# Patient Record
Sex: FEMALE | Employment: OTHER | URBAN - METROPOLITAN AREA
[De-identification: names, ages, dates, MRNs, and addresses within clinical notes are randomized per-mention and may not be internally consistent; named-entity substitution may affect disease eponyms.]

---

## 2020-03-13 ENCOUNTER — TELEPHONE (OUTPATIENT)
Dept: INFECTIOUS DISEASES | Age: 85
End: 2020-03-13

## 2020-03-20 ENCOUNTER — VIRTUAL VISIT (OUTPATIENT)
Dept: INFECTIOUS DISEASES | Age: 85
End: 2020-03-20

## 2020-03-20 PROCEDURE — 99443 PR PHYS/QHP TELEPHONE EVALUATION 21-30 MIN: CPT | Performed by: INTERNAL MEDICINE

## 2020-03-20 RX ORDER — NITROFURANTOIN 25; 75 MG/1; MG/1
100 CAPSULE ORAL DAILY
Qty: 14 CAPSULE | Refills: 0 | Status: SHIPPED | OUTPATIENT
Start: 2020-03-20 | End: 2020-04-03

## 2020-03-20 ASSESSMENT — ENCOUNTER SYMPTOMS
COUGH: 1
BACK PAIN: 1
SHORTNESS OF BREATH: 1

## 2020-03-20 NOTE — PROGRESS NOTES
Subjective:      Patient ID: Denise Tripathi is a 80 y.o. female. HPI. She was instructed that he will be billed for the phone visit and she is agreeable    Referred by Dr Catarino Ham for recurrent MDR E coli UTI  Cystoscopy: + multiple trabeculations and high postvoid residuals. PMHx: HTN, esophageal web, OA, GERD, HLP, CKD 3, IDDM2, urine retention  PSHx, social Hx and FHx were reviewed, not contributory  On Cipro 2 x weekly  Review of Systems   Respiratory: Positive for cough (dry, for 1 week) and shortness of breath (FRANCO). Genitourinary: Positive for dysuria, frequency (nocturia) and urgency. Musculoskeletal: Positive for arthralgias (pain in legs) and back pain (low, progressive). All other systems reviewed and are negative. Allergies: shellfish and Sulfa  Objective:   Physical Exam   No exam done  02/24 and 11/2019 urine Cx + ESBL E coli sens to Nitrofurantoin  3/1/2020  2:37 PM - RobbieRamirez cat Incoming Lab Results From Soft     Component Value Ref Range & Units Status Collected Lab   Hemoglobin A1C 7.5High   4.8 - 5.9 % Final 03/01/2020 11:30 AM [address] [facility]   Testing Performed By     3/1/2020  3:47 PM - Ramirez Avalos Incoming Lab Results From Soft     Component Value Ref Range & Units Status Collected Lab   Sodium 137  135 - 144 mEq/L Final 03/01/2020 11:30 AM [address] [facility]   Potassium 5.0High   3.4 - 4.9 mEq/L Final 03/01/2020 11:30 AM  - [facility]   Chloride 101  95 - 107 mEq/L Final 03/01/2020 11:30 AM [address] [facility]   CO2 22  20 - 31 mEq/L Final 03/01/2020 11:30 AM [address] [facility]   Anion Gap 14  9 - 15 mEq/L Final 03/01/2020 11:30 AM [address] [facility]   Glucose 152High   70 - 99 mg/dL Final 03/01/2020 11:30 AM [address] [facility]   BUN 34High   8 - 23 mg/dL Final 03/01/2020 11:30 AM [address] [facility]   CREATININE 1. 63High   0.50 - 0.90 mg/dL Final 03/01/2020 11:30 AM [address] [facility]   GFR Non-African American 29.8Low >60 Final 03/01/2020 11:30 AM  - [facility]   >60 mL/min/1.73m2 EGFR, calc. for ages        Assessment:    Recurrent UTI  ESBL E coli infection  Neurogenic Bladder  IDDM with CKD   9:30 am-10:00 am   Plan:      U/A with reflex Cx in 1 week  D/C Cipro  Nitrofurantoin 100 mg daily for 2 weeks and 50 mg daily afterwards for chronic suppressive Rx  Might need straight cath if persistent urine retention  Lactobacillus          Inocente Bennett MD

## 2020-04-06 ENCOUNTER — TELEPHONE (OUTPATIENT)
Dept: INFECTIOUS DISEASES | Age: 85
End: 2020-04-06

## 2020-04-06 RX ORDER — LOSARTAN POTASSIUM 100 MG/1
TABLET ORAL DAILY
COMMUNITY

## 2020-04-06 RX ORDER — TAMSULOSIN HYDROCHLORIDE 0.4 MG/1
CAPSULE ORAL DAILY
COMMUNITY
Start: 2020-03-26

## 2020-04-06 RX ORDER — VERAPAMIL HYDROCHLORIDE 300 MG/1
CAPSULE, EXTENDED RELEASE ORAL
COMMUNITY

## 2020-04-06 RX ORDER — OXYBUTYNIN CHLORIDE 10 MG/1
TABLET, EXTENDED RELEASE ORAL
COMMUNITY

## 2020-04-06 RX ORDER — DULOXETIN HYDROCHLORIDE 20 MG/1
CAPSULE, DELAYED RELEASE ORAL DAILY
COMMUNITY

## 2020-04-06 RX ORDER — MIRABEGRON 50 MG/1
50 TABLET, FILM COATED, EXTENDED RELEASE ORAL DAILY
COMMUNITY
Start: 2020-03-05

## 2020-04-06 RX ORDER — BLOOD SUGAR DIAGNOSTIC
STRIP MISCELLANEOUS
COMMUNITY
Start: 2020-03-16

## 2020-04-06 NOTE — TELEPHONE ENCOUNTER
Patient states she saw Dr Carissa Garcia last month for UTI, referred by Dr Barbara Kamara. States she is having Burning sensation when she voids, Denies Fever/Chills. will update ID Physician. Found telephone visit/encounter was on 3/20/20 with Dr Carissa Garcia. Patient states she is Only On Cephalexine 500 mg, po, she takes One on Wed's & Sun's Only, per Dr Barbara Kamara. Will update Dr Chadd Barillas, covering for Dr Carissa Garcia.

## 2020-04-07 LAB
APPEARANCE: ABNORMAL
BACTERIA, URINE: ABNORMAL /HPF
BILIRUBIN, URINE: NEGATIVE
BLOOD, URINE: NEGATIVE
COLOR, URINE: YELLOW
GLUCOSE, URINE: NEGATIVE MG/DL
KETONES, URINE: NEGATIVE MG/DL
LEUKOCYTE ESTERASE, URINE: ABNORMAL
NITRITE, URINE: POSITIVE
PH UA: 5 (ref 5–8)
PROTEIN UA: ABNORMAL MG/DL
RBC URINE: ABNORMAL /HPF (ref 0–5)
SPECIFIC GRAVITY, URINE: 1.01 (ref 1–1)
SQUAMOUS EPITHELIAL: ABNORMAL /HPF
UROBILINOGEN, URINE: <2 MG/DL (ref 0–1.9)
WBC URINE: ABNORMAL /HPF (ref 0–5)

## 2020-04-09 ENCOUNTER — VIRTUAL VISIT (OUTPATIENT)
Dept: INFECTIOUS DISEASES | Age: 85
End: 2020-04-09

## 2020-04-09 PROCEDURE — 99214 OFFICE O/P EST MOD 30 MIN: CPT | Performed by: INTERNAL MEDICINE

## 2020-04-09 RX ORDER — GRANULES FOR ORAL 3 G/1
3 POWDER ORAL ONCE
Qty: 1 EACH | Refills: 0 | Status: SHIPPED | OUTPATIENT
Start: 2020-04-09 | End: 2020-04-09

## 2020-04-09 NOTE — PROGRESS NOTES
weeks and possible 50mg daily after for chronic suppression  Has hx of urinary retention    Appears to be doing ok. Subjectively, + dysuria    All 14 review of systems discussed and negative other than as stated as above. Since we cannot conduct an in-person exam, the following were addressed with the patient. I have asked the patient to assist in their exam:  Patient denies any general new issues. Patient does not observe any new skin rashes or ulcers. Patient does not perceive any new visual deficits  Patient does not feel like they are \"clammy\" or sweating  Patient denies any dry mouth or sore mouth and is able to flex and extend her neck with ease. Patient can inhale and exhale without any difficulty and feels like their chest is expanding symmetrically. They do not feel short of breath or any distress when asked to move around. No pain with expansion of the chest  Patient is able to feel their own pulse and agrees it is regular. Patient states their abdomen is not protruberant beyond their normal size. States that there is no pain when touching the area beneath the sternum when directed, or the left or the right side of the abdomen. They feel no pain when asked to press on the suprapubic area or the right or left flank. Patient denies any pain when asked to squeeze both upper legs and lower legs anteriorly or posteriorly. They do not see any new swelling of their joints. No observed neurological changes or slurred speech when speaking to the patient      Imaging and labs were reviewed per medical records and any ID pertinent labs were addressed with the patient. No results found for: WBC, HGB, HCT, MCV, PLT    At this point, the camera cut out and we finished the interview by telephone. ASSESSMENT  MDR E coli UTI s/p cysto with multiple trabeculations    Recurrent UTI  ESBL E coli infection - has yet to be treated bc she did not  her macrobid yet.   Neurogenic Bladder  IDDM with CKD    PLAN:  CX pending. Nitrofurantoin 100 mg BID for 10 days and then possibly 50 mg daily afterwards for chronic suppressive Rx  Lactobacillus  Cranberry supplement - states that she just bought a bottle but has yet to use it. Follow up in 2-3 weeks. Discussed with patient ways to help prevent recurrent UTIs, including but not limited to timed voiding, cranberry supplement, and maintaining good overall hygiene.  Handwashing is essential.     Time spent with patient: 25 min  >50% of time spent counseling on patient care and coordination of care      Marivel Pitts DO

## 2020-04-20 ENCOUNTER — TELEPHONE (OUTPATIENT)
Dept: INFECTIOUS DISEASES | Age: 85
End: 2020-04-20

## 2020-05-01 ENCOUNTER — VIRTUAL VISIT (OUTPATIENT)
Dept: INFECTIOUS DISEASES | Age: 85
End: 2020-05-01

## 2020-05-01 LAB
ANION GAP SERPL CALCULATED.3IONS-SCNC: 11 MMOL/L (ref 10–20)
APPEARANCE: CLEAR
BACTERIA, URINE: ABNORMAL /HPF
BICARBONATE: 29 MMOL/L (ref 21–32)
BILIRUBIN, URINE: NEGATIVE
BLOOD, URINE: NEGATIVE
CALCIUM SERPL-MCNC: 9.6 MG/DL (ref 8.6–10.3)
CHLORIDE BLD-SCNC: 97 MMOL/L (ref 98–107)
COLOR, URINE: YELLOW
CREAT SERPL-MCNC: 1.7 MG/DL (ref 0.5–1)
ERYTHROCYTE [DISTWIDTH] IN BLOOD BY AUTOMATED COUNT: 13.2 % (ref 11.5–14)
GFR AFRICAN AMERICAN: 34 ML/MIN/1.73M2
GFR NON-AFRICAN AMERICAN: 28 ML/MIN/1.73M2
GLUCOSE, URINE: NEGATIVE MG/DL
GLUCOSE: 137 MG/DL (ref 74–99)
HCT VFR BLD CALC: 36.7 % (ref 36–46)
HEMOGLOBIN: 11.9 G/DL (ref 12–16)
KETONES, URINE: NEGATIVE MG/DL
LEUKOCYTE ESTERASE, URINE: ABNORMAL
MCHC RBC AUTO-ENTMCNC: 32.4 G/DL (ref 32–36)
MCV RBC AUTO: 90 FL (ref 80–100)
NITRITE, URINE: NEGATIVE
PH UA: 7 (ref 5–8)
PLATELET # BLD: 206 X10E9/L (ref 150–450)
POTASSIUM SERPL-SCNC: 4.9 MMOL/L (ref 3.5–5.3)
PROTEIN UA: ABNORMAL MG/DL
RBC # BLD: 4.1 X10E12/L (ref 4–5.2)
RBC URINE: ABNORMAL /HPF (ref 0–5)
SODIUM BLD-SCNC: 132 MMOL/L (ref 136–145)
SPECIFIC GRAVITY, URINE: 1.01 (ref 1–1)
SPECIMEN SOURCE: ABNORMAL
SQUAMOUS EPITHELIAL: ABNORMAL /HPF
UREA NITROGEN: 38 MG/DL (ref 6–23)
UROBILINOGEN, URINE: <2 MG/DL (ref 0–1.9)
WBC URINE: ABNORMAL /HPF (ref 0–5)
WBC: 7.9 X10E9/L (ref 4.4–11.3)

## 2020-05-01 PROCEDURE — 99213 OFFICE O/P EST LOW 20 MIN: CPT | Performed by: INTERNAL MEDICINE

## 2020-05-01 ASSESSMENT — ENCOUNTER SYMPTOMS: BACK PAIN: 1

## 2020-05-12 ENCOUNTER — TELEPHONE (OUTPATIENT)
Dept: INFECTIOUS DISEASES | Age: 85
End: 2020-05-12

## 2020-05-12 NOTE — TELEPHONE ENCOUNTER
Patient has not heard of Lab results, called last week and today. Per review with Dr Livia Villagomez, U/a & Cx resulted on 5/1/20 are fine. Keep same course of Medication as per Dr Shon Mackenzie. Returned call and left a brief message. Request to return a call.

## 2020-05-29 ENCOUNTER — VIRTUAL VISIT (OUTPATIENT)
Dept: INFECTIOUS DISEASES | Age: 85
End: 2020-05-29

## 2020-05-29 PROCEDURE — 99443 PR PHYS/QHP TELEPHONE EVALUATION 21-30 MIN: CPT | Performed by: INTERNAL MEDICINE

## 2020-05-29 RX ORDER — CEPHALEXIN 500 MG/1
500 CAPSULE ORAL DAILY
Qty: 90 CAPSULE | Refills: 3 | Status: SHIPPED | OUTPATIENT
Start: 2020-05-29 | End: 2020-08-27

## 2020-09-01 LAB
ANION GAP SERPL CALCULATED.3IONS-SCNC: 13 MMOL/L (ref 10–20)
BICARBONATE: 27 MMOL/L (ref 21–32)
CALCIUM SERPL-MCNC: 9.7 MG/DL (ref 8.6–10.3)
CHLORIDE BLD-SCNC: 102 MMOL/L (ref 98–107)
CREAT SERPL-MCNC: 1.72 MG/DL (ref 0.5–1)
ERYTHROCYTE [DISTWIDTH] IN BLOOD BY AUTOMATED COUNT: 13.3 % (ref 11.5–14)
GFR AFRICAN AMERICAN: 34 ML/MIN/1.73M2
GFR NON-AFRICAN AMERICAN: 28 ML/MIN/1.73M2
GLUCOSE: 89 MG/DL (ref 74–99)
HCT VFR BLD CALC: 34.7 % (ref 36–46)
HEMOGLOBIN: 11 G/DL (ref 12–16)
MCHC RBC AUTO-ENTMCNC: 31.7 G/DL (ref 32–36)
MCV RBC AUTO: 92 FL (ref 80–100)
PLATELET # BLD: 187 X10E9/L (ref 150–450)
POTASSIUM SERPL-SCNC: 4.9 MMOL/L (ref 3.5–5.3)
RBC # BLD: 3.77 X10E12/L (ref 4–5.2)
SODIUM BLD-SCNC: 137 MMOL/L (ref 136–145)
UREA NITROGEN: 32 MG/DL (ref 6–23)
WBC: 7.1 X10E9/L (ref 4.4–11.3)

## 2023-02-03 PROBLEM — K21.9 GERD (GASTROESOPHAGEAL REFLUX DISEASE): Status: ACTIVE | Noted: 2023-02-03

## 2023-02-03 PROBLEM — N18.6 TYPE 2 DIABETES MELLITUS WITH CHRONIC KIDNEY DISEASE ON CHRONIC DIALYSIS, WITHOUT LONG-TERM CURRENT USE OF INSULIN (MULTI): Status: ACTIVE | Noted: 2023-02-03

## 2023-02-03 PROBLEM — R30.0 BURNING WITH URINATION: Status: ACTIVE | Noted: 2023-02-03

## 2023-02-03 PROBLEM — E78.5 HYPERLIPIDEMIA: Status: ACTIVE | Noted: 2023-02-03

## 2023-02-03 PROBLEM — R32 URINARY INCONTINENCE: Status: ACTIVE | Noted: 2023-02-03

## 2023-02-03 PROBLEM — R35.1 NOCTURIA: Status: ACTIVE | Noted: 2023-02-03

## 2023-02-03 PROBLEM — E11.22 TYPE 2 DIABETES MELLITUS WITH CHRONIC KIDNEY DISEASE ON CHRONIC DIALYSIS, WITHOUT LONG-TERM CURRENT USE OF INSULIN (MULTI): Status: ACTIVE | Noted: 2023-02-03

## 2023-02-03 PROBLEM — N39.0 URINARY TRACT INFECTION: Status: ACTIVE | Noted: 2023-02-03

## 2023-02-03 PROBLEM — R30.0 BURNING WITH URINATION: Status: RESOLVED | Noted: 2023-02-03 | Resolved: 2023-02-03

## 2023-02-03 PROBLEM — I10 BENIGN ESSENTIAL HYPERTENSION: Status: ACTIVE | Noted: 2023-02-03

## 2023-02-03 PROBLEM — N18.4 STAGE 4 CHRONIC KIDNEY DISEASE (MULTI): Status: ACTIVE | Noted: 2023-02-03

## 2023-02-03 PROBLEM — R26.89 BALANCE PROBLEMS: Status: ACTIVE | Noted: 2023-02-03

## 2023-02-03 PROBLEM — Z99.2 TYPE 2 DIABETES MELLITUS WITH CHRONIC KIDNEY DISEASE ON CHRONIC DIALYSIS, WITHOUT LONG-TERM CURRENT USE OF INSULIN (MULTI): Status: ACTIVE | Noted: 2023-02-03

## 2023-02-03 PROBLEM — M15.9 GENERALIZED OSTEOARTHRITIS OF MULTIPLE SITES: Status: ACTIVE | Noted: 2023-02-03

## 2023-02-03 PROBLEM — R82.89 ABNORMAL URINE CYTOLOGY: Status: ACTIVE | Noted: 2023-02-03

## 2023-02-03 PROBLEM — E16.2 HYPOGLYCEMIA: Status: ACTIVE | Noted: 2023-02-03

## 2023-02-03 PROBLEM — E16.2 HYPOGLYCEMIA: Status: RESOLVED | Noted: 2023-02-03 | Resolved: 2023-02-03

## 2023-02-03 RX ORDER — TAMSULOSIN HYDROCHLORIDE 0.4 MG/1
1 CAPSULE ORAL DAILY
COMMUNITY
Start: 2019-04-02

## 2023-02-03 RX ORDER — PANTOPRAZOLE SODIUM 40 MG/1
1 TABLET, DELAYED RELEASE ORAL
COMMUNITY
Start: 2020-11-11

## 2023-02-03 RX ORDER — LOSARTAN POTASSIUM 100 MG/1
100 TABLET ORAL DAILY
COMMUNITY
Start: 2013-09-20 | End: 2023-08-18 | Stop reason: DRUGHIGH

## 2023-02-03 RX ORDER — CEPHALEXIN 500 MG/1
500 CAPSULE ORAL 3 TIMES DAILY
COMMUNITY
Start: 2022-08-17 | End: 2023-05-01 | Stop reason: ALTCHOICE

## 2023-02-03 RX ORDER — CHOLECALCIFEROL (VITAMIN D3) 25 MCG
1 TABLET ORAL DAILY
COMMUNITY
Start: 2014-08-12

## 2023-02-03 RX ORDER — AMMONIUM LACTATE 12 G/100G
LOTION TOPICAL
COMMUNITY
Start: 2022-05-10

## 2023-02-03 RX ORDER — BLOOD SUGAR DIAGNOSTIC
STRIP MISCELLANEOUS
COMMUNITY
Start: 2019-12-17 | End: 2023-05-24 | Stop reason: SDUPTHER

## 2023-02-03 RX ORDER — OXYBUTYNIN CHLORIDE 10 MG/1
10 TABLET, EXTENDED RELEASE ORAL DAILY
COMMUNITY
Start: 2020-03-06 | End: 2023-05-01 | Stop reason: SINTOL

## 2023-02-03 RX ORDER — DULOXETIN HYDROCHLORIDE 20 MG/1
1 CAPSULE, DELAYED RELEASE ORAL DAILY
COMMUNITY
Start: 2020-03-10 | End: 2023-06-12 | Stop reason: SDUPTHER

## 2023-02-03 RX ORDER — INSULIN GLARGINE 100 [IU]/ML
20 INJECTION, SOLUTION SUBCUTANEOUS DAILY
COMMUNITY

## 2023-02-03 RX ORDER — VERAPAMIL HYDROCHLORIDE 240 MG/1
240 CAPSULE, EXTENDED RELEASE ORAL
COMMUNITY
Start: 2013-05-21 | End: 2023-06-16 | Stop reason: SDUPTHER

## 2023-03-06 LAB
APPEARANCE, URINE: ABNORMAL
BACTERIA, URINE: ABNORMAL /HPF
BILIRUBIN, URINE: NEGATIVE
BLOOD, URINE: NEGATIVE
COLOR, URINE: YELLOW
GLUCOSE, URINE: NEGATIVE MG/DL
HYALINE CASTS, URINE: ABNORMAL /LPF
KETONES, URINE: ABNORMAL MG/DL
LEUKOCYTE ESTERASE, URINE: ABNORMAL
MUCUS, URINE: ABNORMAL /LPF
NITRITE, URINE: NEGATIVE
PH, URINE: 5 (ref 5–8)
PROTEIN, URINE: ABNORMAL MG/DL
RBC, URINE: 4 /HPF (ref 0–5)
SPECIFIC GRAVITY, URINE: 1.01 (ref 1–1.03)
SQUAMOUS EPITHELIAL CELLS, URINE: <1 /HPF
UROBILINOGEN, URINE: <2 MG/DL (ref 0–1.9)
WBC CLUMPS, URINE: ABNORMAL /HPF
WBC, URINE: >182 /HPF (ref 0–5)

## 2023-03-09 LAB — URINE CULTURE: ABNORMAL

## 2023-03-17 ENCOUNTER — APPOINTMENT (OUTPATIENT)
Dept: PRIMARY CARE | Facility: CLINIC | Age: 88
End: 2023-03-17
Payer: MEDICARE

## 2023-04-07 LAB
APPEARANCE, URINE: ABNORMAL
BACTERIA, URINE: ABNORMAL /HPF
BILIRUBIN, URINE: NEGATIVE
BLOOD, URINE: NEGATIVE
COLOR, URINE: YELLOW
GLUCOSE, URINE: NEGATIVE MG/DL
HYALINE CASTS, URINE: ABNORMAL /LPF
KETONES, URINE: NEGATIVE MG/DL
LEUKOCYTE ESTERASE, URINE: ABNORMAL
MUCUS, URINE: ABNORMAL /LPF
NITRITE, URINE: NEGATIVE
PH, URINE: 5 (ref 5–8)
PROTEIN, URINE: ABNORMAL MG/DL
RBC, URINE: ABNORMAL /HPF (ref 0–5)
SPECIFIC GRAVITY, URINE: 1.01 (ref 1–1.03)
SQUAMOUS EPITHELIAL CELLS, URINE: 5 /HPF
UROBILINOGEN, URINE: <2 MG/DL (ref 0–1.9)
WBC, URINE: 27 /HPF (ref 0–5)

## 2023-04-09 LAB — URINE CULTURE: ABNORMAL

## 2023-05-01 ENCOUNTER — OFFICE VISIT (OUTPATIENT)
Dept: PRIMARY CARE | Facility: CLINIC | Age: 88
End: 2023-05-01
Payer: MEDICARE

## 2023-05-01 ENCOUNTER — APPOINTMENT (OUTPATIENT)
Dept: LAB | Facility: LAB | Age: 88
End: 2023-05-01
Payer: MEDICARE

## 2023-05-01 ENCOUNTER — LAB (OUTPATIENT)
Dept: LAB | Facility: LAB | Age: 88
End: 2023-05-01
Payer: MEDICARE

## 2023-05-01 VITALS
HEART RATE: 69 BPM | SYSTOLIC BLOOD PRESSURE: 124 MMHG | HEIGHT: 67 IN | TEMPERATURE: 96 F | DIASTOLIC BLOOD PRESSURE: 75 MMHG | WEIGHT: 152 LBS | BODY MASS INDEX: 23.86 KG/M2

## 2023-05-01 DIAGNOSIS — E11.22 TYPE 2 DIABETES MELLITUS WITH CHRONIC KIDNEY DISEASE ON CHRONIC DIALYSIS, WITHOUT LONG-TERM CURRENT USE OF INSULIN (MULTI): ICD-10-CM

## 2023-05-01 DIAGNOSIS — I10 BENIGN ESSENTIAL HYPERTENSION: ICD-10-CM

## 2023-05-01 DIAGNOSIS — N18.6 TYPE 2 DIABETES MELLITUS WITH CHRONIC KIDNEY DISEASE ON CHRONIC DIALYSIS, WITHOUT LONG-TERM CURRENT USE OF INSULIN (MULTI): ICD-10-CM

## 2023-05-01 DIAGNOSIS — Z99.2 TYPE 2 DIABETES MELLITUS WITH CHRONIC KIDNEY DISEASE ON CHRONIC DIALYSIS, WITHOUT LONG-TERM CURRENT USE OF INSULIN (MULTI): ICD-10-CM

## 2023-05-01 DIAGNOSIS — Z00.00 ROUTINE GENERAL MEDICAL EXAMINATION AT HEALTH CARE FACILITY: Primary | ICD-10-CM

## 2023-05-01 DIAGNOSIS — N18.4 STAGE 4 CHRONIC KIDNEY DISEASE (MULTI): ICD-10-CM

## 2023-05-01 LAB
CHOLESTEROL (MG/DL) IN SER/PLAS: 203 MG/DL (ref 0–199)
CHOLESTEROL IN HDL (MG/DL) IN SER/PLAS: 62.3 MG/DL
CHOLESTEROL/HDL RATIO: 3.3
ESTIMATED AVERAGE GLUCOSE FOR HBA1C: 192 MG/DL
HEMOGLOBIN A1C/HEMOGLOBIN TOTAL IN BLOOD: 8.3 %
LDL: 116 MG/DL (ref 0–99)
TRIGLYCERIDE (MG/DL) IN SER/PLAS: 124 MG/DL (ref 0–149)
VLDL: 25 MG/DL (ref 0–40)

## 2023-05-01 PROCEDURE — 1170F FXNL STATUS ASSESSED: CPT | Performed by: INTERNAL MEDICINE

## 2023-05-01 PROCEDURE — 82043 UR ALBUMIN QUANTITATIVE: CPT

## 2023-05-01 PROCEDURE — 80061 LIPID PANEL: CPT

## 2023-05-01 PROCEDURE — 82570 ASSAY OF URINE CREATININE: CPT

## 2023-05-01 PROCEDURE — 99397 PER PM REEVAL EST PAT 65+ YR: CPT | Performed by: INTERNAL MEDICINE

## 2023-05-01 PROCEDURE — 1160F RVW MEDS BY RX/DR IN RCRD: CPT | Performed by: INTERNAL MEDICINE

## 2023-05-01 PROCEDURE — 1036F TOBACCO NON-USER: CPT | Performed by: INTERNAL MEDICINE

## 2023-05-01 PROCEDURE — 83036 HEMOGLOBIN GLYCOSYLATED A1C: CPT

## 2023-05-01 PROCEDURE — G0439 PPPS, SUBSEQ VISIT: HCPCS | Performed by: INTERNAL MEDICINE

## 2023-05-01 PROCEDURE — 3074F SYST BP LT 130 MM HG: CPT | Performed by: INTERNAL MEDICINE

## 2023-05-01 PROCEDURE — 3078F DIAST BP <80 MM HG: CPT | Performed by: INTERNAL MEDICINE

## 2023-05-01 PROCEDURE — 36415 COLL VENOUS BLD VENIPUNCTURE: CPT

## 2023-05-01 PROCEDURE — 1159F MED LIST DOCD IN RCRD: CPT | Performed by: INTERNAL MEDICINE

## 2023-05-01 ASSESSMENT — ENCOUNTER SYMPTOMS
CONSTITUTIONAL NEGATIVE: 1
JOINT SWELLING: 0
EYES NEGATIVE: 1
BACK PAIN: 0
OCCASIONAL FEELINGS OF UNSTEADINESS: 0
CARDIOVASCULAR NEGATIVE: 1
NUMBNESS: 1
SEIZURES: 0
PSYCHIATRIC NEGATIVE: 1
ARTHRALGIAS: 1
LOSS OF SENSATION IN FEET: 0
DEPRESSION: 0
RESPIRATORY NEGATIVE: 1
DIZZINESS: 0
MYALGIAS: 0
GASTROINTESTINAL NEGATIVE: 1

## 2023-05-01 ASSESSMENT — ACTIVITIES OF DAILY LIVING (ADL)
MANAGING_FINANCES: NEEDS ASSISTANCE
BATHING: INDEPENDENT
GROCERY_SHOPPING: NEEDS ASSISTANCE
DRESSING: INDEPENDENT
DOING_HOUSEWORK: INDEPENDENT
TAKING_MEDICATION: INDEPENDENT

## 2023-05-01 NOTE — PROGRESS NOTES
"Subjective   Reason for Visit: Kamila Agarwal is an 89 y.o. female here for a Medicare Wellness visit.     Past Medical, Surgical, and Family History reviewed and updated in chart.    Reviewed all medications by prescribing practitioner or clinical pharmacist (such as prescriptions, OTCs, herbal therapies and supplements) and documented in the medical record.    Patient was seen for wellness exam today, she was accompanied by her daughter, no falls, urology evaluation was reviewed, I am not sure whether she is on oxybutynin or not, her blood sugars are not out of ordinary, last times hemoglobin A1c was reviewed.  She is not hypoglycemic, her eye checkups are up to date, she just turned 89, somewhat unsteady on her feet, dizziness is not intractable, her independence has been well kept and maintained, foot care was reviewed, ophthalmic checkup is up to date.  Longstanding history of mild coronary artery disease with a coronary angiogram in 1995 showing 50% lesion in distal LAD, never had any problem after that, at the age of 89 her cognitive functions are excellent so we did a wellness exam related checkmarks and the questions, living will is up to date daughter says.        Patient Care Team:  Markus Bradshaw MD as PCP - General  Markus Bradshaw MD as PCP - [employer] PCP     Review of Systems   Constitutional: Negative.    HENT: Negative.     Eyes: Negative.    Respiratory: Negative.     Cardiovascular: Negative.    Gastrointestinal: Negative.    Musculoskeletal:  Positive for arthralgias. Negative for back pain, joint swelling and myalgias.   Skin: Negative.    Neurological:  Positive for numbness. Negative for dizziness and seizures.   Psychiatric/Behavioral: Negative.         Objective   Vitals:  /75 (BP Location: Left arm, Patient Position: Sitting, BP Cuff Size: Adult)   Pulse 69   Temp 35.6 °C (96 °F) (Temporal)   Ht 1.702 m (5' 7\")   Wt 68.9 kg (152 lb)   BMI 23.81 kg/m²   "     Physical Exam  Constitutional:       Appearance: Normal appearance. She is normal weight.   HENT:      Head: Normocephalic.      Nose: Nose normal.   Eyes:      Conjunctiva/sclera: Conjunctivae normal.   Cardiovascular:      Rate and Rhythm: Normal rate and regular rhythm.      Pulses: Normal pulses.      Heart sounds: Normal heart sounds.   Pulmonary:      Effort: Pulmonary effort is normal.      Breath sounds: Normal breath sounds.   Abdominal:      General: Abdomen is flat.      Palpations: Abdomen is soft.   Musculoskeletal:         General: Normal range of motion.      Cervical back: Normal range of motion and neck supple.      Comments: DP felt well. Mild sensory impairment   Skin:     General: Skin is warm and dry.   Neurological:      General: No focal deficit present.      Mental Status: She is oriented to person, place, and time. Mental status is at baseline.   Psychiatric:         Mood and Affect: Mood normal.         Judgment: Judgment normal.       Assessment/Plan   Problem List Items Addressed This Visit          Circulatory    Benign essential hypertension       Genitourinary    Stage 4 chronic kidney disease (CMS/HCC)       Endocrine/Metabolic    Type 2 diabetes mellitus with chronic kidney disease on chronic dialysis, without long-term current use of insulin (CMS/[facility])     Other Visit Diagnoses       Routine general medical examination at health care facility    -  Primary        CKD and various stages of CKD and significance explained, CKD is very common and rising diagnosis among [place] adults. Main culprits for CKD etiology needs to be attended well. GFR values explained. Nephrotoxic agents has to be avoided, plenty of water consumption is always beneficial. Avoid NSAIDs, always check with MD about usage of OTC medications or any other Rx given by other providers. GFR less then 10 usually will need renal replacement therapy. Episodic check on renal functions needed. Always ask MD about GFR at next  visit. Avoid dehydration.   Chronic kidney disease persist, no breathing compromise, blood sugars are not out of ordinary, vaccinations are reviewed, daily routine has been well kept, hydration has been appropriately maintained, BP readings are reviewed, current therapy were reviewed, no kidney stones.  Not doing any screening test at this age.  Daughter has been living next door so she has been keeping close check on her, I think it is time for her not to drive anymore, driving should be suspended because of poor reflexes and aging effect.  No change in therapy, initially to be continued.  Not on any antibacterials lately, wellness exam was completed.

## 2023-05-02 LAB
ALBUMIN (MG/L) IN URINE: 909.9 MG/L
ALBUMIN/CREATININE (UG/MG) IN URINE: 1516.5 UG/MG CRT (ref 0–30)
CREATININE (MG/DL) IN URINE: 60 MG/DL (ref 20–320)

## 2023-05-24 DIAGNOSIS — E11.22 TYPE 2 DIABETES MELLITUS WITH CHRONIC KIDNEY DISEASE ON CHRONIC DIALYSIS, WITHOUT LONG-TERM CURRENT USE OF INSULIN (MULTI): ICD-10-CM

## 2023-05-24 DIAGNOSIS — N18.6 TYPE 2 DIABETES MELLITUS WITH CHRONIC KIDNEY DISEASE ON CHRONIC DIALYSIS, WITHOUT LONG-TERM CURRENT USE OF INSULIN (MULTI): ICD-10-CM

## 2023-05-24 DIAGNOSIS — Z99.2 TYPE 2 DIABETES MELLITUS WITH CHRONIC KIDNEY DISEASE ON CHRONIC DIALYSIS, WITHOUT LONG-TERM CURRENT USE OF INSULIN (MULTI): ICD-10-CM

## 2023-05-24 RX ORDER — BLOOD SUGAR DIAGNOSTIC
2 STRIP MISCELLANEOUS 2 TIMES DAILY
Qty: 200 STRIP | Refills: 0 | Status: SHIPPED | OUTPATIENT
Start: 2023-05-24 | End: 2023-05-26 | Stop reason: ENTERED-IN-ERROR

## 2023-05-26 DIAGNOSIS — Z99.2 TYPE 2 DIABETES MELLITUS WITH CHRONIC KIDNEY DISEASE ON CHRONIC DIALYSIS, WITHOUT LONG-TERM CURRENT USE OF INSULIN (MULTI): ICD-10-CM

## 2023-05-26 DIAGNOSIS — E11.22 TYPE 2 DIABETES MELLITUS WITH CHRONIC KIDNEY DISEASE ON CHRONIC DIALYSIS, WITHOUT LONG-TERM CURRENT USE OF INSULIN (MULTI): ICD-10-CM

## 2023-05-26 DIAGNOSIS — N18.6 TYPE 2 DIABETES MELLITUS WITH CHRONIC KIDNEY DISEASE ON CHRONIC DIALYSIS, WITHOUT LONG-TERM CURRENT USE OF INSULIN (MULTI): ICD-10-CM

## 2023-06-09 DIAGNOSIS — N18.6 TYPE 2 DIABETES MELLITUS WITH CHRONIC KIDNEY DISEASE ON CHRONIC DIALYSIS, WITHOUT LONG-TERM CURRENT USE OF INSULIN (MULTI): ICD-10-CM

## 2023-06-09 DIAGNOSIS — E11.22 TYPE 2 DIABETES MELLITUS WITH CHRONIC KIDNEY DISEASE ON CHRONIC DIALYSIS, WITHOUT LONG-TERM CURRENT USE OF INSULIN (MULTI): ICD-10-CM

## 2023-06-09 DIAGNOSIS — Z99.2 TYPE 2 DIABETES MELLITUS WITH CHRONIC KIDNEY DISEASE ON CHRONIC DIALYSIS, WITHOUT LONG-TERM CURRENT USE OF INSULIN (MULTI): ICD-10-CM

## 2023-06-12 DIAGNOSIS — M15.9 GENERALIZED OSTEOARTHROSIS, INVOLVING MULTIPLE SITES: ICD-10-CM

## 2023-06-12 DIAGNOSIS — N18.6 TYPE 2 DIABETES MELLITUS WITH CHRONIC KIDNEY DISEASE ON CHRONIC DIALYSIS, WITHOUT LONG-TERM CURRENT USE OF INSULIN (MULTI): ICD-10-CM

## 2023-06-12 DIAGNOSIS — Z99.2 TYPE 2 DIABETES MELLITUS WITH CHRONIC KIDNEY DISEASE ON CHRONIC DIALYSIS, WITHOUT LONG-TERM CURRENT USE OF INSULIN (MULTI): ICD-10-CM

## 2023-06-12 DIAGNOSIS — E11.22 TYPE 2 DIABETES MELLITUS WITH CHRONIC KIDNEY DISEASE ON CHRONIC DIALYSIS, WITHOUT LONG-TERM CURRENT USE OF INSULIN (MULTI): ICD-10-CM

## 2023-06-12 RX ORDER — DULOXETIN HYDROCHLORIDE 20 MG/1
20 CAPSULE, DELAYED RELEASE ORAL DAILY
Qty: 90 CAPSULE | Refills: 3 | Status: SHIPPED | OUTPATIENT
Start: 2023-06-12 | End: 2023-09-10

## 2023-06-16 DIAGNOSIS — E11.22 TYPE 2 DIABETES MELLITUS WITH CHRONIC KIDNEY DISEASE ON CHRONIC DIALYSIS, WITHOUT LONG-TERM CURRENT USE OF INSULIN (MULTI): ICD-10-CM

## 2023-06-16 DIAGNOSIS — N18.6 TYPE 2 DIABETES MELLITUS WITH CHRONIC KIDNEY DISEASE ON CHRONIC DIALYSIS, WITHOUT LONG-TERM CURRENT USE OF INSULIN (MULTI): ICD-10-CM

## 2023-06-16 DIAGNOSIS — I10 BENIGN ESSENTIAL HYPERTENSION: ICD-10-CM

## 2023-06-16 DIAGNOSIS — Z99.2 TYPE 2 DIABETES MELLITUS WITH CHRONIC KIDNEY DISEASE ON CHRONIC DIALYSIS, WITHOUT LONG-TERM CURRENT USE OF INSULIN (MULTI): ICD-10-CM

## 2023-06-16 LAB
APPEARANCE, URINE: ABNORMAL
BACTERIA, URINE: ABNORMAL /HPF
BILIRUBIN, URINE: NEGATIVE
BLOOD, URINE: NEGATIVE
COLOR, URINE: YELLOW
GLUCOSE, URINE: NEGATIVE MG/DL
KETONES, URINE: NEGATIVE MG/DL
LEUKOCYTE ESTERASE, URINE: ABNORMAL
MUCUS, URINE: ABNORMAL /LPF
NITRITE, URINE: NEGATIVE
PH, URINE: 5 (ref 5–8)
PROTEIN, URINE: ABNORMAL MG/DL
RBC, URINE: 1 /HPF (ref 0–5)
SPECIFIC GRAVITY, URINE: 1.01 (ref 1–1.03)
SQUAMOUS EPITHELIAL CELLS, URINE: <1 /HPF
UROBILINOGEN, URINE: <2 MG/DL (ref 0–1.9)
WBC, URINE: 48 /HPF (ref 0–5)

## 2023-06-16 RX ORDER — VERAPAMIL HYDROCHLORIDE 240 MG/1
240 CAPSULE, EXTENDED RELEASE ORAL
Qty: 30 CAPSULE | Refills: 3 | Status: SHIPPED | OUTPATIENT
Start: 2023-06-16 | End: 2023-06-20 | Stop reason: SDUPTHER

## 2023-06-18 LAB — URINE CULTURE: NO GROWTH

## 2023-06-20 DIAGNOSIS — I10 BENIGN ESSENTIAL HYPERTENSION: ICD-10-CM

## 2023-06-20 RX ORDER — VERAPAMIL HYDROCHLORIDE 240 MG/1
240 CAPSULE, EXTENDED RELEASE ORAL
Qty: 90 CAPSULE | Refills: 3 | Status: SHIPPED | OUTPATIENT
Start: 2023-06-20 | End: 2023-11-01 | Stop reason: CLARIF

## 2023-07-07 DIAGNOSIS — N18.6 TYPE 2 DIABETES MELLITUS WITH CHRONIC KIDNEY DISEASE ON CHRONIC DIALYSIS, WITHOUT LONG-TERM CURRENT USE OF INSULIN (MULTI): ICD-10-CM

## 2023-07-07 DIAGNOSIS — E11.22 TYPE 2 DIABETES MELLITUS WITH CHRONIC KIDNEY DISEASE ON CHRONIC DIALYSIS, WITHOUT LONG-TERM CURRENT USE OF INSULIN (MULTI): ICD-10-CM

## 2023-07-07 DIAGNOSIS — Z99.2 TYPE 2 DIABETES MELLITUS WITH CHRONIC KIDNEY DISEASE ON CHRONIC DIALYSIS, WITHOUT LONG-TERM CURRENT USE OF INSULIN (MULTI): ICD-10-CM

## 2023-07-07 RX ORDER — PEN NEEDLE, DIABETIC 29 G X1/2"
NEEDLE, DISPOSABLE MISCELLANEOUS
Qty: 100 EACH | Refills: 3 | Status: SHIPPED | OUTPATIENT
Start: 2023-07-07 | End: 2023-11-01 | Stop reason: CLARIF

## 2023-07-20 ENCOUNTER — HOSPITAL ENCOUNTER (EMERGENCY)
Age: 88
Discharge: HOME | End: 2023-07-21
Payer: MEDICARE

## 2023-07-20 ENCOUNTER — APPOINTMENT (OUTPATIENT)
Dept: GENERAL RADIOLOGY | Age: 88
End: 2023-07-20
Payer: MEDICARE

## 2023-07-20 ENCOUNTER — APPOINTMENT (OUTPATIENT)
Dept: CT IMAGING | Age: 88
End: 2023-07-20
Payer: MEDICARE

## 2023-07-20 VITALS
OXYGEN SATURATION: 100 % | RESPIRATION RATE: 16 BRPM | HEART RATE: 65 BPM | BODY MASS INDEX: 23.54 KG/M2 | SYSTOLIC BLOOD PRESSURE: 183 MMHG | TEMPERATURE: 97.7 F | HEIGHT: 67 IN | DIASTOLIC BLOOD PRESSURE: 75 MMHG | WEIGHT: 150 LBS

## 2023-07-20 DIAGNOSIS — S09.90XA CLOSED HEAD INJURY, INITIAL ENCOUNTER: ICD-10-CM

## 2023-07-20 DIAGNOSIS — M79.641 RIGHT HAND PAIN: ICD-10-CM
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
## 2023-07-20 PROCEDURE — 72125 CT NECK SPINE W/O DYE: CPT

## 2023-07-20 PROCEDURE — 70450 CT HEAD/BRAIN W/O DYE: CPT

## 2023-07-20 PROCEDURE — 73130 X-RAY EXAM OF HAND: CPT

## 2023-07-20 PROCEDURE — 99285 EMERGENCY DEPT VISIT HI MDM: CPT

## 2023-07-20 ASSESSMENT — PAIN - FUNCTIONAL ASSESSMENT: PAIN_FUNCTIONAL_ASSESSMENT: 0-10

## 2023-07-20 ASSESSMENT — ENCOUNTER SYMPTOMS
COUGH: 0
ABDOMINAL PAIN: 0
EYE ITCHING: 0
ALLERGIC/IMMUNOLOGIC NEGATIVE: 1
EYE PAIN: 0
DIARRHEA: 0
EYE DISCHARGE: 0
SHORTNESS OF BREATH: 0
VOMITING: 0
NAUSEA: 0
CONSTIPATION: 0

## 2023-07-20 ASSESSMENT — PAIN DESCRIPTION - LOCATION
LOCATION: FINGER (COMMENT WHICH ONE)
LOCATION: HAND

## 2023-07-20 ASSESSMENT — PAIN DESCRIPTION - ORIENTATION: ORIENTATION: RIGHT

## 2023-07-20 ASSESSMENT — PAIN DESCRIPTION - DESCRIPTORS: DESCRIPTORS: ACHING;THROBBING

## 2023-07-20 ASSESSMENT — PAIN DESCRIPTION - PAIN TYPE
TYPE: ACUTE PAIN
TYPE: ACUTE PAIN

## 2023-07-20 ASSESSMENT — PAIN SCALES - GENERAL: PAINLEVEL_OUTOF10: 3

## 2023-07-20 ASSESSMENT — PAIN DESCRIPTION - FREQUENCY: FREQUENCY: CONTINUOUS

## 2023-07-20 ASSESSMENT — PAIN DESCRIPTION - ONSET: ONSET: SUDDEN

## 2023-07-21 NOTE — DISCHARGE INSTRUCTIONS
Take Motrin, Tylenol as needed for symptom relief. Follow-up with PCP. Return to ED for any new, worsening symptoms.

## 2023-07-21 NOTE — ED NOTES
Discharge instructions reviewed with pt and family  Pt and family verbalized understanding with no questions or concerns. Resps even, non labored. Skin p/w/d. Pt aware to alternate between tylenol and ibuprophen for breakthrough pain. Ice and elevate for pain/swelling/symptom relief. No acute distress noted at this time. Pt is ambulatory via walker. Visitors at bedside. GCS 15.   PAT Schwarz RN  07/21/23 0000

## 2023-08-11 ENCOUNTER — DOCUMENTATION (OUTPATIENT)
Dept: PRIMARY CARE | Facility: CLINIC | Age: 88
End: 2023-08-11
Payer: MEDICARE

## 2023-08-11 NOTE — PROGRESS NOTES
This patient was communicated via telephone, she has a fall and she has a close head injury, she has been having unsteadiness of ambulation and gait, she has been having multiple areas of pain and discomfort, she has osteoarthritis of multiple sites, she will qualify for home health care for physical therapy and Occupational Therapy and home health care should be started and continued.

## 2023-08-13 DIAGNOSIS — R29.6 FALLS FREQUENTLY: Primary | ICD-10-CM

## 2023-08-13 DIAGNOSIS — Z99.2 TYPE 2 DIABETES MELLITUS WITH CHRONIC KIDNEY DISEASE ON CHRONIC DIALYSIS, WITHOUT LONG-TERM CURRENT USE OF INSULIN (MULTI): ICD-10-CM

## 2023-08-13 DIAGNOSIS — N18.6 TYPE 2 DIABETES MELLITUS WITH CHRONIC KIDNEY DISEASE ON CHRONIC DIALYSIS, WITHOUT LONG-TERM CURRENT USE OF INSULIN (MULTI): ICD-10-CM

## 2023-08-13 DIAGNOSIS — R26.89 BALANCE PROBLEMS: ICD-10-CM

## 2023-08-13 DIAGNOSIS — E11.22 TYPE 2 DIABETES MELLITUS WITH CHRONIC KIDNEY DISEASE ON CHRONIC DIALYSIS, WITHOUT LONG-TERM CURRENT USE OF INSULIN (MULTI): ICD-10-CM

## 2023-08-14 ENCOUNTER — APPOINTMENT (OUTPATIENT)
Dept: PRIMARY CARE | Facility: CLINIC | Age: 88
End: 2023-08-14
Payer: MEDICARE

## 2023-08-18 ENCOUNTER — OFFICE VISIT (OUTPATIENT)
Dept: PRIMARY CARE | Facility: CLINIC | Age: 88
End: 2023-08-18
Payer: MEDICARE

## 2023-08-18 VITALS
HEART RATE: 67 BPM | WEIGHT: 145 LBS | DIASTOLIC BLOOD PRESSURE: 74 MMHG | TEMPERATURE: 96.7 F | HEIGHT: 65 IN | SYSTOLIC BLOOD PRESSURE: 124 MMHG | BODY MASS INDEX: 24.16 KG/M2

## 2023-08-18 DIAGNOSIS — Z99.2 TYPE 2 DIABETES MELLITUS WITH CHRONIC KIDNEY DISEASE ON CHRONIC DIALYSIS, WITHOUT LONG-TERM CURRENT USE OF INSULIN (MULTI): ICD-10-CM

## 2023-08-18 DIAGNOSIS — I10 BENIGN ESSENTIAL HYPERTENSION: Primary | ICD-10-CM

## 2023-08-18 DIAGNOSIS — N18.6 TYPE 2 DIABETES MELLITUS WITH CHRONIC KIDNEY DISEASE ON CHRONIC DIALYSIS, WITHOUT LONG-TERM CURRENT USE OF INSULIN (MULTI): ICD-10-CM

## 2023-08-18 DIAGNOSIS — N18.4 STAGE 4 CHRONIC KIDNEY DISEASE (MULTI): ICD-10-CM

## 2023-08-18 DIAGNOSIS — R26.89 BALANCE PROBLEMS: ICD-10-CM

## 2023-08-18 DIAGNOSIS — R63.4 WEIGHT LOSS: ICD-10-CM

## 2023-08-18 DIAGNOSIS — E11.22 TYPE 2 DIABETES MELLITUS WITH CHRONIC KIDNEY DISEASE ON CHRONIC DIALYSIS, WITHOUT LONG-TERM CURRENT USE OF INSULIN (MULTI): ICD-10-CM

## 2023-08-18 PROCEDURE — 1036F TOBACCO NON-USER: CPT | Performed by: INTERNAL MEDICINE

## 2023-08-18 PROCEDURE — 3078F DIAST BP <80 MM HG: CPT | Performed by: INTERNAL MEDICINE

## 2023-08-18 PROCEDURE — 99213 OFFICE O/P EST LOW 20 MIN: CPT | Performed by: INTERNAL MEDICINE

## 2023-08-18 PROCEDURE — 1159F MED LIST DOCD IN RCRD: CPT | Performed by: INTERNAL MEDICINE

## 2023-08-18 PROCEDURE — 1160F RVW MEDS BY RX/DR IN RCRD: CPT | Performed by: INTERNAL MEDICINE

## 2023-08-18 PROCEDURE — 3074F SYST BP LT 130 MM HG: CPT | Performed by: INTERNAL MEDICINE

## 2023-08-18 RX ORDER — CEPHALEXIN 500 MG/1
CAPSULE ORAL
COMMUNITY

## 2023-08-18 RX ORDER — OXYBUTYNIN CHLORIDE 10 MG/1
TABLET, EXTENDED RELEASE ORAL
COMMUNITY
Start: 2020-03-06

## 2023-08-18 RX ORDER — NITROFURANTOIN 25; 75 MG/1; MG/1
CAPSULE ORAL
COMMUNITY

## 2023-08-18 RX ORDER — LOSARTAN POTASSIUM 100 MG/1
50 TABLET ORAL DAILY
Status: SHIPPED
Start: 2023-08-18 | End: 2023-11-01 | Stop reason: CLARIF

## 2023-08-18 ASSESSMENT — ENCOUNTER SYMPTOMS
EYES NEGATIVE: 1
LIGHT-HEADEDNESS: 1
RESPIRATORY NEGATIVE: 1
MUSCULOSKELETAL NEGATIVE: 1
CARDIOVASCULAR NEGATIVE: 1
DIABETIC ASSOCIATED SYMPTOMS: 0
WEAKNESS: 0
DIZZINESS: 1
GASTROINTESTINAL NEGATIVE: 1
HYPERTENSION: 1
CONSTITUTIONAL NEGATIVE: 1

## 2023-08-18 NOTE — PROGRESS NOTES
Subjective   Patient ID: Kamila Agarwal is a 89 y.o. female who presents for Follow-up (3 mo fu).    Diabetes  She presents for her follow-up diabetic visit. She has type 2 diabetes mellitus. Her disease course has been fluctuating. Hypoglycemia symptoms include dizziness. There are no diabetic associated symptoms. Pertinent negatives for diabetes include no weakness. There are no hypoglycemic complications. Diabetic complications include nephropathy. Pertinent negatives for diabetic complications include no heart disease. Risk factors for coronary artery disease include diabetes mellitus, hypertension and post-menopausal. She is compliant with treatment all of the time. Her weight is decreasing steadily. She has not had a previous visit with a dietitian. Eye exam is current.   Hypertension  This is a chronic problem. The current episode started more than 1 year ago. The problem has been resolved since onset. The problem is controlled. Risk factors for coronary artery disease include diabetes mellitus and post-menopausal state. Past treatments include calcium channel blockers and angiotensin blockers. The current treatment provides significant improvement. Hypertensive end-organ damage includes kidney disease. Identifiable causes of hypertension include chronic renal disease.   Fall  The accident occurred More than 1 week ago. The fall occurred while standing and while walking. There was no blood loss. The point of impact was the head and face. The patient is experiencing no pain. The treatment provided significant relief.        Review of Systems   Constitutional: Negative.    HENT: Negative.     Eyes: Negative.    Respiratory: Negative.     Cardiovascular: Negative.    Gastrointestinal: Negative.    Musculoskeletal: Negative.    Skin: Negative.    Neurological:  Positive for dizziness and light-headedness. Negative for weakness.       Objective   /74 (BP Location: Left arm, Patient Position: Sitting, BP  "Cuff Size: Adult)   Pulse 67   Temp 35.9 °C (96.7 °F) (Temporal)   Ht 1.657 m (5' 5.25\")   Wt 65.8 kg (145 lb)   BMI 23.94 kg/m²     Physical Exam  Vitals reviewed.   Constitutional:       Appearance: Normal appearance. She is normal weight.   HENT:      Head: Normocephalic and atraumatic.   Eyes:      Conjunctiva/sclera: Conjunctivae normal.   Cardiovascular:      Rate and Rhythm: Normal rate and regular rhythm.      Pulses: Normal pulses.      Heart sounds: Murmur heard.      Systolic murmur is present with a grade of 2/6.   Pulmonary:      Effort: Pulmonary effort is normal.      Breath sounds: Normal breath sounds.   Abdominal:      Palpations: Abdomen is soft.   Musculoskeletal:         General: Normal range of motion.      Cervical back: Normal range of motion.   Skin:     General: Skin is warm and dry.   Neurological:      General: No focal deficit present.   Psychiatric:         Mood and Affect: Mood normal.       Assessment/Plan   Problem List Items Addressed This Visit       Balance problems    Benign essential hypertension - Primary    Stage 4 chronic kidney disease (CMS/[facility])    Type 2 diabetes mellitus with chronic kidney disease on chronic dialysis, without long-term current use of insulin (CMS/[facility])   She has had 3 falls, we have arranged home health care, patient was made to come to the office for face-to-face encounter and it is always difficult to do face-to-face encounter when patient has a problem with the transportation home health care was arranged by communication.  Patient is on oxybutynin and tamsulosin this her not the safest medication for 89-year-old female patient, it can cause some altered cognitive functions and also tamsulosin can cause orthostasis, cut down losartan to 50 mg, take tamsulosin at night, would not worry about a blood sugar of 200s or hemoglobin A1c of 8.2.  Avoid hypoglycemia, diving has been suspended, she is using a walker for mobility and ambulation, 35 hours of " home health care has been provided in a week.  Aging is there, balance problem is there, it could be a sensory polyneuropathy, it could be sensory ataxia, patient is a fluctuating renal functions 3-4 chronic kidney disease.  Osteoarthritis is significant, recent ER report was reviewed, CT of brain and C-spine and hand x-rays were reviewed, we see that there is a progressive weight loss, as a preliminary purpose we will do a chest radiograph and ultrasound of abdomen to see any hidden pathology is obvious to cause weight loss, daughter was present today during office encounter, follow-up in 3 months.

## 2023-09-01 ENCOUNTER — APPOINTMENT (OUTPATIENT)
Dept: PRIMARY CARE | Facility: CLINIC | Age: 88
End: 2023-09-01
Payer: MEDICARE

## 2023-09-06 ENCOUNTER — HOSPITAL ENCOUNTER (EMERGENCY)
Age: 88
Discharge: HOME | End: 2023-09-06
Attending: STUDENT IN AN ORGANIZED HEALTH CARE EDUCATION/TRAINING PROGRAM
Payer: MEDICARE

## 2023-09-06 VITALS
RESPIRATION RATE: 17 BRPM | HEART RATE: 69 BPM | BODY MASS INDEX: 23.66 KG/M2 | HEIGHT: 65 IN | OXYGEN SATURATION: 98 % | SYSTOLIC BLOOD PRESSURE: 137 MMHG | WEIGHT: 142 LBS | TEMPERATURE: 97.2 F | DIASTOLIC BLOOD PRESSURE: 86 MMHG

## 2023-09-06 DIAGNOSIS — H57.11 EYE PAIN, RIGHT: ICD-10-CM

## 2023-09-06 DIAGNOSIS — R51.9 NONINTRACTABLE HEADACHE, UNSPECIFIED CHRONICITY PATTERN, UNSPECIFIED HEADACHE TYPE: Primary | ICD-10-CM

## 2023-09-06 PROCEDURE — 6370000000 HC RX 637 (ALT 250 FOR IP): Performed by: STUDENT IN AN ORGANIZED HEALTH CARE EDUCATION/TRAINING PROGRAM

## 2023-09-06 PROCEDURE — 99282 EMERGENCY DEPT VISIT SF MDM: CPT

## 2023-09-06 PROCEDURE — 2500000003 HC RX 250 WO HCPCS: Performed by: STUDENT IN AN ORGANIZED HEALTH CARE EDUCATION/TRAINING PROGRAM

## 2023-09-06 RX ORDER — PROPARACAINE HYDROCHLORIDE 5 MG/ML
1 SOLUTION/ DROPS OPHTHALMIC ONCE
Status: COMPLETED | OUTPATIENT
Start: 2023-09-06 | End: 2023-09-06

## 2023-09-06 RX ADMIN — PROPARACAINE HYDROCHLORIDE 1 DROP: 5 SOLUTION/ DROPS OPHTHALMIC at 15:29

## 2023-09-06 RX ADMIN — FLUORESCEIN SODIUM 1 MG: 1 STRIP OPHTHALMIC at 15:29

## 2023-09-06 ASSESSMENT — PAIN DESCRIPTION - PAIN TYPE: TYPE: ACUTE PAIN

## 2023-09-06 ASSESSMENT — PAIN DESCRIPTION - LOCATION: LOCATION: EYE

## 2023-09-06 ASSESSMENT — PAIN SCALES - GENERAL: PAINLEVEL_OUTOF10: 5

## 2023-09-06 ASSESSMENT — PAIN - FUNCTIONAL ASSESSMENT: PAIN_FUNCTIONAL_ASSESSMENT: 0-10

## 2023-09-15 ENCOUNTER — APPOINTMENT (OUTPATIENT)
Dept: PRIMARY CARE | Facility: CLINIC | Age: 88
End: 2023-09-15
Payer: MEDICARE

## 2023-09-15 ASSESSMENT — VISUAL ACUITY: OU: 1

## 2023-09-15 ASSESSMENT — TONOMETRY
IOP_HANDHELD: 1
OD_IOP_MMHG: 15

## 2023-09-19 ENCOUNTER — APPOINTMENT (OUTPATIENT)
Dept: CT IMAGING | Age: 88
DRG: 078 | End: 2023-09-19
Attending: STUDENT IN AN ORGANIZED HEALTH CARE EDUCATION/TRAINING PROGRAM
Payer: MEDICARE

## 2023-09-19 ENCOUNTER — HOSPITAL ENCOUNTER (INPATIENT)
Age: 88
LOS: 8 days | Discharge: NURSING, REHAB OR LONG-TERM CARE | DRG: 078 | End: 2023-09-27
Attending: STUDENT IN AN ORGANIZED HEALTH CARE EDUCATION/TRAINING PROGRAM | Admitting: INTERNAL MEDICINE
Payer: MEDICARE

## 2023-09-19 DIAGNOSIS — R55 SYNCOPE AND COLLAPSE: ICD-10-CM

## 2023-09-19 DIAGNOSIS — R41.82 ALTERED MENTAL STATUS, UNSPECIFIED ALTERED MENTAL STATUS TYPE: Primary | ICD-10-CM

## 2023-09-19 PROBLEM — I63.9 ACUTE CVA (CEREBROVASCULAR ACCIDENT) (HCC): Status: ACTIVE | Noted: 2023-09-19

## 2023-09-19 LAB
ABO + RH BLD: NORMAL
ALBUMIN SERPL-MCNC: 3.9 G/DL (ref 3.5–4.6)
ALP SERPL-CCNC: 78 U/L (ref 40–130)
ALT SERPL-CCNC: <5 U/L (ref 0–33)
ANION GAP SERPL CALCULATED.3IONS-SCNC: 12 MEQ/L (ref 9–15)
APTT PPP: 30.4 SEC (ref 24.4–36.8)
AST SERPL-CCNC: 9 U/L (ref 0–35)
BACTERIA URNS QL MICRO: NEGATIVE /HPF
BASOPHILS # BLD: 0 K/UL (ref 0–0.2)
BASOPHILS NFR BLD: 0.5 %
BILIRUB SERPL-MCNC: 0.3 MG/DL (ref 0.2–0.7)
BILIRUB UR QL STRIP: NEGATIVE
BLD GP AB SCN SERPL QL: NORMAL
BNP BLD-MCNC: 791 PG/ML
BUN SERPL-MCNC: 28 MG/DL (ref 8–23)
CALCIUM SERPL-MCNC: 9.7 MG/DL (ref 8.5–9.9)
CHLORIDE SERPL-SCNC: 99 MEQ/L (ref 95–107)
CHP ED QC CHECK: YES
CK SERPL-CCNC: 29 U/L (ref 0–170)
CLARITY UR: CLEAR
CO2 SERPL-SCNC: 25 MEQ/L (ref 20–31)
COLOR UR: YELLOW
CREAT SERPL-MCNC: 1.92 MG/DL (ref 0.5–0.9)
EOSINOPHIL # BLD: 0.1 K/UL (ref 0–0.7)
EOSINOPHIL NFR BLD: 1.4 %
EPI CELLS #/AREA URNS AUTO: ABNORMAL /HPF (ref 0–5)
ERYTHROCYTE [DISTWIDTH] IN BLOOD BY AUTOMATED COUNT: 12.8 % (ref 11.5–14.5)
GLOBULIN SER CALC-MCNC: 2.7 G/DL (ref 2.3–3.5)
GLUCOSE BLD-MCNC: 149 MG/DL
GLUCOSE BLD-MCNC: 149 MG/DL (ref 70–99)
GLUCOSE SERPL-MCNC: 152 MG/DL (ref 70–99)
GLUCOSE UR STRIP-MCNC: NEGATIVE MG/DL
HCT VFR BLD AUTO: 36.7 % (ref 37–47)
HGB BLD-MCNC: 11.8 G/DL (ref 12–16)
HGB UR QL STRIP: NEGATIVE
HYALINE CASTS #/AREA URNS AUTO: ABNORMAL /HPF (ref 0–5)
INR PPP: 1.1
KETONES UR STRIP-MCNC: ABNORMAL MG/DL
LACTATE BLDV-SCNC: 1.9 MMOL/L (ref 0.5–2.2)
LEUKOCYTE ESTERASE UR QL STRIP: NEGATIVE
LYMPHOCYTES # BLD: 2 K/UL (ref 1–4.8)
LYMPHOCYTES NFR BLD: 24.5 %
MAGNESIUM SERPL-MCNC: 2.1 MG/DL (ref 1.7–2.4)
MCH RBC QN AUTO: 29.1 PG (ref 27–31.3)
MCHC RBC AUTO-ENTMCNC: 32.2 % (ref 33–37)
MCV RBC AUTO: 90.6 FL (ref 79.4–94.8)
MONOCYTES # BLD: 0.6 K/UL (ref 0.2–0.8)
MONOCYTES NFR BLD: 6.9 %
NEUTROPHILS # BLD: 5.5 K/UL (ref 1.4–6.5)
NEUTS SEG NFR BLD: 66.3 %
NITRITE UR QL STRIP: NEGATIVE
PERFORMED ON: ABNORMAL
PH UR STRIP: 7 [PH] (ref 5–9)
PLATELET # BLD AUTO: 208 K/UL (ref 130–400)
POC CREATININE WHOLE BLOOD: 2.2
POTASSIUM SERPL-SCNC: 4.9 MEQ/L (ref 3.4–4.9)
PROT SERPL-MCNC: 6.6 G/DL (ref 6.3–8)
PROT UR STRIP-MCNC: >=300 MG/DL
PROTHROMBIN TIME: 14 SEC (ref 12.3–14.9)
RBC # BLD AUTO: 4.05 M/UL (ref 4.2–5.4)
RBC #/AREA URNS AUTO: ABNORMAL /HPF (ref 0–5)
SARS-COV-2 RDRP RESP QL NAA+PROBE: NOT DETECTED
SODIUM SERPL-SCNC: 136 MEQ/L (ref 135–144)
SP GR UR STRIP: 1.01 (ref 1–1.03)
TROPONIN T SERPL-MCNC: <0.01 NG/ML (ref 0–0.01)
TSH REFLEX: 2.7 UIU/ML (ref 0.44–3.86)
URINE REFLEX TO CULTURE: ABNORMAL
UROBILINOGEN UR STRIP-ACNC: 0.2 E.U./DL
WBC # BLD AUTO: 8.3 K/UL (ref 4.8–10.8)
WBC #/AREA URNS AUTO: ABNORMAL /HPF (ref 0–5)

## 2023-09-19 PROCEDURE — 70450 CT HEAD/BRAIN W/O DYE: CPT

## 2023-09-19 PROCEDURE — 83605 ASSAY OF LACTIC ACID: CPT

## 2023-09-19 PROCEDURE — C9113 INJ PANTOPRAZOLE SODIUM, VIA: HCPCS | Performed by: INTERNAL MEDICINE

## 2023-09-19 PROCEDURE — 84443 ASSAY THYROID STIM HORMONE: CPT

## 2023-09-19 PROCEDURE — 93005 ELECTROCARDIOGRAM TRACING: CPT | Performed by: STUDENT IN AN ORGANIZED HEALTH CARE EDUCATION/TRAINING PROGRAM

## 2023-09-19 PROCEDURE — 81001 URINALYSIS AUTO W/SCOPE: CPT

## 2023-09-19 PROCEDURE — 72128 CT CHEST SPINE W/O DYE: CPT

## 2023-09-19 PROCEDURE — 99284 EMERGENCY DEPT VISIT MOD MDM: CPT | Performed by: PHYSICIAN ASSISTANT

## 2023-09-19 PROCEDURE — 85610 PROTHROMBIN TIME: CPT

## 2023-09-19 PROCEDURE — 36415 COLL VENOUS BLD VENIPUNCTURE: CPT

## 2023-09-19 PROCEDURE — 83735 ASSAY OF MAGNESIUM: CPT

## 2023-09-19 PROCEDURE — 6830039000 HC L3 TRAUMA ALERT

## 2023-09-19 PROCEDURE — 96375 TX/PRO/DX INJ NEW DRUG ADDON: CPT

## 2023-09-19 PROCEDURE — 2500000003 HC RX 250 WO HCPCS: Performed by: STUDENT IN AN ORGANIZED HEALTH CARE EDUCATION/TRAINING PROGRAM

## 2023-09-19 PROCEDURE — 83880 ASSAY OF NATRIURETIC PEPTIDE: CPT

## 2023-09-19 PROCEDURE — 80053 COMPREHEN METABOLIC PANEL: CPT

## 2023-09-19 PROCEDURE — 84484 ASSAY OF TROPONIN QUANT: CPT

## 2023-09-19 PROCEDURE — 86850 RBC ANTIBODY SCREEN: CPT

## 2023-09-19 PROCEDURE — 87635 SARS-COV-2 COVID-19 AMP PRB: CPT

## 2023-09-19 PROCEDURE — 2580000003 HC RX 258: Performed by: STUDENT IN AN ORGANIZED HEALTH CARE EDUCATION/TRAINING PROGRAM

## 2023-09-19 PROCEDURE — 6360000002 HC RX W HCPCS: Performed by: STUDENT IN AN ORGANIZED HEALTH CARE EDUCATION/TRAINING PROGRAM

## 2023-09-19 PROCEDURE — 2000000000 HC ICU R&B

## 2023-09-19 PROCEDURE — A4216 STERILE WATER/SALINE, 10 ML: HCPCS | Performed by: STUDENT IN AN ORGANIZED HEALTH CARE EDUCATION/TRAINING PROGRAM

## 2023-09-19 PROCEDURE — 86901 BLOOD TYPING SEROLOGIC RH(D): CPT

## 2023-09-19 PROCEDURE — 71250 CT THORAX DX C-: CPT

## 2023-09-19 PROCEDURE — 82550 ASSAY OF CK (CPK): CPT

## 2023-09-19 PROCEDURE — 72125 CT NECK SPINE W/O DYE: CPT

## 2023-09-19 PROCEDURE — 99285 EMERGENCY DEPT VISIT HI MDM: CPT

## 2023-09-19 PROCEDURE — 74176 CT ABD & PELVIS W/O CONTRAST: CPT

## 2023-09-19 PROCEDURE — 86900 BLOOD TYPING SEROLOGIC ABO: CPT

## 2023-09-19 PROCEDURE — 96374 THER/PROPH/DIAG INJ IV PUSH: CPT

## 2023-09-19 PROCEDURE — 85730 THROMBOPLASTIN TIME PARTIAL: CPT

## 2023-09-19 PROCEDURE — 85025 COMPLETE CBC W/AUTO DIFF WBC: CPT

## 2023-09-19 PROCEDURE — 6360000002 HC RX W HCPCS: Performed by: INTERNAL MEDICINE

## 2023-09-19 PROCEDURE — 6370000000 HC RX 637 (ALT 250 FOR IP): Performed by: STUDENT IN AN ORGANIZED HEALTH CARE EDUCATION/TRAINING PROGRAM

## 2023-09-19 PROCEDURE — 72131 CT LUMBAR SPINE W/O DYE: CPT

## 2023-09-19 PROCEDURE — 2580000003 HC RX 258: Performed by: INTERNAL MEDICINE

## 2023-09-19 RX ORDER — ATORVASTATIN CALCIUM 80 MG/1
80 TABLET, FILM COATED ORAL NIGHTLY
Status: DISCONTINUED | OUTPATIENT
Start: 2023-09-19 | End: 2023-09-22

## 2023-09-19 RX ORDER — SODIUM CHLORIDE 9 MG/ML
INJECTION, SOLUTION INTRAVENOUS PRN
Status: DISCONTINUED | OUTPATIENT
Start: 2023-09-19 | End: 2023-09-27 | Stop reason: HOSPADM

## 2023-09-19 RX ORDER — CEPHALEXIN 500 MG/1
500 CAPSULE ORAL
Status: ON HOLD | COMMUNITY
End: 2023-09-22 | Stop reason: HOSPADM

## 2023-09-19 RX ORDER — PANTOPRAZOLE SODIUM 40 MG/10ML
40 INJECTION, POWDER, LYOPHILIZED, FOR SOLUTION INTRAVENOUS DAILY
Status: DISCONTINUED | OUTPATIENT
Start: 2023-09-19 | End: 2023-09-27 | Stop reason: HOSPADM

## 2023-09-19 RX ORDER — ASPIRIN 300 MG/1
300 SUPPOSITORY RECTAL ONCE
Status: COMPLETED | OUTPATIENT
Start: 2023-09-19 | End: 2023-09-19

## 2023-09-19 RX ORDER — ASPIRIN 300 MG/1
300 SUPPOSITORY RECTAL DAILY
Status: DISCONTINUED | OUTPATIENT
Start: 2023-09-20 | End: 2023-09-27 | Stop reason: HOSPADM

## 2023-09-19 RX ORDER — ONDANSETRON 2 MG/ML
4 INJECTION INTRAMUSCULAR; INTRAVENOUS ONCE
Status: COMPLETED | OUTPATIENT
Start: 2023-09-19 | End: 2023-09-19

## 2023-09-19 RX ORDER — ONDANSETRON 2 MG/ML
4 INJECTION INTRAMUSCULAR; INTRAVENOUS EVERY 6 HOURS PRN
Status: DISCONTINUED | OUTPATIENT
Start: 2023-09-19 | End: 2023-09-27 | Stop reason: HOSPADM

## 2023-09-19 RX ORDER — NITROFURANTOIN 25; 75 MG/1; MG/1
100 CAPSULE ORAL
COMMUNITY

## 2023-09-19 RX ORDER — ENOXAPARIN SODIUM 100 MG/ML
30 INJECTION SUBCUTANEOUS DAILY
Status: DISCONTINUED | OUTPATIENT
Start: 2023-09-19 | End: 2023-09-27 | Stop reason: HOSPADM

## 2023-09-19 RX ORDER — ASPIRIN 81 MG/1
81 TABLET, CHEWABLE ORAL DAILY
Status: DISCONTINUED | OUTPATIENT
Start: 2023-09-20 | End: 2023-09-27 | Stop reason: HOSPADM

## 2023-09-19 RX ORDER — HYDRALAZINE HYDROCHLORIDE 20 MG/ML
10 INJECTION INTRAMUSCULAR; INTRAVENOUS ONCE
Status: COMPLETED | OUTPATIENT
Start: 2023-09-19 | End: 2023-09-19

## 2023-09-19 RX ORDER — 0.9 % SODIUM CHLORIDE 0.9 %
1000 INTRAVENOUS SOLUTION INTRAVENOUS ONCE
Status: COMPLETED | OUTPATIENT
Start: 2023-09-19 | End: 2023-09-19

## 2023-09-19 RX ORDER — SODIUM CHLORIDE 0.9 % (FLUSH) 0.9 %
5-40 SYRINGE (ML) INJECTION EVERY 12 HOURS SCHEDULED
Status: DISCONTINUED | OUTPATIENT
Start: 2023-09-19 | End: 2023-09-27 | Stop reason: HOSPADM

## 2023-09-19 RX ORDER — ONDANSETRON 4 MG/1
4 TABLET, ORALLY DISINTEGRATING ORAL EVERY 8 HOURS PRN
Status: DISCONTINUED | OUTPATIENT
Start: 2023-09-19 | End: 2023-09-27 | Stop reason: HOSPADM

## 2023-09-19 RX ORDER — SODIUM CHLORIDE, SODIUM LACTATE, POTASSIUM CHLORIDE, CALCIUM CHLORIDE 600; 310; 30; 20 MG/100ML; MG/100ML; MG/100ML; MG/100ML
INJECTION, SOLUTION INTRAVENOUS CONTINUOUS
Status: DISCONTINUED | OUTPATIENT
Start: 2023-09-19 | End: 2023-09-20

## 2023-09-19 RX ORDER — MORPHINE SULFATE 4 MG/ML
4 INJECTION, SOLUTION INTRAMUSCULAR; INTRAVENOUS ONCE
Status: COMPLETED | OUTPATIENT
Start: 2023-09-19 | End: 2023-09-19

## 2023-09-19 RX ORDER — SODIUM CHLORIDE 0.9 % (FLUSH) 0.9 %
5-40 SYRINGE (ML) INJECTION PRN
Status: DISCONTINUED | OUTPATIENT
Start: 2023-09-19 | End: 2023-09-27 | Stop reason: HOSPADM

## 2023-09-19 RX ORDER — POLYETHYLENE GLYCOL 3350 17 G/17G
17 POWDER, FOR SOLUTION ORAL DAILY PRN
Status: DISCONTINUED | OUTPATIENT
Start: 2023-09-19 | End: 2023-09-27 | Stop reason: HOSPADM

## 2023-09-19 RX ORDER — ASCORBIC ACID 500 MG
1000 TABLET ORAL EVERY EVENING
COMMUNITY

## 2023-09-19 RX ADMIN — ONDANSETRON 4 MG: 2 INJECTION INTRAMUSCULAR; INTRAVENOUS at 17:23

## 2023-09-19 RX ADMIN — ONDANSETRON 4 MG: 2 INJECTION INTRAMUSCULAR; INTRAVENOUS at 15:35

## 2023-09-19 RX ADMIN — SODIUM CHLORIDE, POTASSIUM CHLORIDE, SODIUM LACTATE AND CALCIUM CHLORIDE: 600; 310; 30; 20 INJECTION, SOLUTION INTRAVENOUS at 20:40

## 2023-09-19 RX ADMIN — FAMOTIDINE 20 MG: 10 INJECTION INTRAVENOUS at 15:38

## 2023-09-19 RX ADMIN — ASPIRIN 300 MG: 300 SUPPOSITORY RECTAL at 16:46

## 2023-09-19 RX ADMIN — ENOXAPARIN SODIUM 30 MG: 100 INJECTION SUBCUTANEOUS at 19:20

## 2023-09-19 RX ADMIN — SODIUM CHLORIDE 5 MG/HR: 9 INJECTION, SOLUTION INTRAVENOUS at 15:42

## 2023-09-19 RX ADMIN — MORPHINE SULFATE 4 MG: 4 INJECTION, SOLUTION INTRAMUSCULAR; INTRAVENOUS at 17:24

## 2023-09-19 RX ADMIN — PANTOPRAZOLE SODIUM 40 MG: 40 INJECTION, POWDER, FOR SOLUTION INTRAVENOUS at 21:04

## 2023-09-19 RX ADMIN — SODIUM CHLORIDE 1000 ML: 9 INJECTION, SOLUTION INTRAVENOUS at 16:52

## 2023-09-19 RX ADMIN — HYDRALAZINE HYDROCHLORIDE 10 MG: 20 INJECTION, SOLUTION INTRAMUSCULAR; INTRAVENOUS at 15:37

## 2023-09-19 ASSESSMENT — PAIN SCALES - GENERAL
PAINLEVEL_OUTOF10: 0
PAINLEVEL_OUTOF10: 0

## 2023-09-19 NOTE — ED PROVIDER NOTES
[facility] ICU  eMERGENCY dEPARTMENT eNCOUnter      Pt Name: Luz Augustin  MRN: [id number]  [address] [facility] [place] 3/6/1933  Date of evaluation: 9/19/2023  Provider: Radames Wilkerson MD  Note Started: 9/19/23 2:07 PM EDT    HISTORY OF PRESENT ILLNESS      Chief Complaint   Patient presents with    Fall       The history is provided by the Patient, EMS, and Family (Daughter). Luz Augustin is a 80 y.o. female with a PMH clinically significant for HTN, HLD, DM II, CKD, GERD, OA, and Anemia presenting to the ED via EMS c/o altered mental status after being found down at 11 AM this morning. EMS report family spoke to the patient at 5:30 PM last night. Seemed like she was at her baseline at that time. Report that the daughter called the patient this morning at 10 AM and found her to be acting abnormally. Did not seem like she was doing well. States that home health care found her on the ground and picked her up into her chair. No reported anticoagulation. States patient was hypertensive, blood glucose within normal limits. Appreciated to have minimal speech and right-sided facial droop. Patient not endorsing any pain at this time, shaking head no to the question. Difficulty obtaining further history however. Confused. Family who arrived later in the ED states that the patient has not had any recent illnesses. Thinks that they were eating and drinking normally yesterday. Denies any history of stroke. Not on anticoagulation. Per Chart Review: PMH as noted above obtained via outpatient chart review.        REVIEW OF SYSTEMS       Review of Systems  Otherwise as noted in HPI    PAST MEDICAL HISTORY     Past Medical History:   Diagnosis Date    Back pain 07/17/2009    Balance problems 05/16/2020    Constipation 08/03/2022    Dysphagia 08/03/2022    Falls 08/03/2022    Hyperlipidemia     Hypertension     Incomplete bladder emptying 04/28/2009    Neuropathy     Piriformis syndrome 08/03/2022    Stage 3 chronic kidney disease

## 2023-09-19 NOTE — ACP (ADVANCE CARE PLANNING)
Advance Care Planning     Advance Care Planning Activator (Inpatient)  Conversation Note      Date of ACP Conversation: 9/19/2023     Conversation Conducted with: patient's daughter Garrison Miramontes, pt is not able to participate in a conversation. ACP Activator: Saba Dover RN        Per [facility], they do have paper work for the patient's desired code status of DNRCC-Arrest. The patient was not able to confirm this status due to her current condition: CVA with confusion.     Health Care Decision Maker:     Current Designated Health Care Decision Maker:     Primary Decision Maker: Garrison Miramontes - Child - [phone]    Secondary Decision Maker: Johny Ayala - Son-in-Law - [phone]

## 2023-09-19 NOTE — ED TRIAGE NOTES
Alert female, skin pink warm and dry. Pt not making sense verbally. Per beata, family talked to her last night at [phone] and she was normal. States they spoke to her again at 12 today and she sounded \"a hot mess\", like out of breath. [employer] then found her on the floor at 1100 today and helped her up.

## 2023-09-19 NOTE — H&P
Patient is unable to provide history. All information was obtained from ER physician. From the home meds patient has history of diabetes, hypertension, comes in here for altered mental status. Was found down by EMS. As per ER physician patient was well-known at 5:30 in the afternoon I was called at 10:00 and she was not responding EMS was called here by EMS patient was found down. Patient evaluated by trauma service and cleared to be admitted. Patient CT was negative for acute pathology. Patient DNR CCA. ER physician spoke with neurology recommended blood pressure to be less than 180 on Cardene and Cardene drip. Patient blood pressure up arrival was 200s over 100        History reviewed. No pertinent past medical history. History reviewed. No pertinent surgical history.   Social History       Tobacco History       Smoking Status  Unknown      Smokeless Tobacco Use  Unknown              Alcohol History       Alcohol Use Status  Defer              Drug Use       Drug Use Status  Defer              Sexual Activity       Sexually Active  Not Asked                 ROS: 12 point review systems unable to provide due to acuity of medical condition  HEENT: AT/NC, + facial droop on the right side  HEART: s1/s2 wnl w/o s3, w/o mr.rg    Neck: supple and midline  LUNG: clear, no wheez  ABD: soft, NT  EXT: no edema  SKin : no rash  Neuro:right sided weakness, aphasic,   1) CVA  2) HTN emergecny  3) CKD   4) hyperglycemia  Admit to ICU  Cardene drip  Neuro eval  MRI  Echo with bubble  Neuro checks  Palliative care  Speech and swallow eval  Repeat labs  Fall and aspiration precuation  High intensity statin  Hgba1c  Lipid panel

## 2023-09-19 NOTE — FLOWSHEET NOTE
Patient here from ER, very agitated and shaky. When asked if she is cold she says yes. Patient temp 98.4, warmer applied for comfort and she rolled onto her right side and fell asleep. Her blood pressure when she is still is 130's-150's. She has slight deficits on the right side. Facial droop also on the right side. When she is asked where she is, her response is yes, okay. However, she does follow simple commands like squeezing, push, and lift. She is unable to perform a swallow test at this time. Her daughter is at bedside and states that the patient is able to under go a MRI. She has nothing that would prevent from doing so. Patient is incontinent of bowel and bladder. She had a medium bm this shift. Skin is intact. Mepilex's in place and scd's applied.

## 2023-09-20 ENCOUNTER — APPOINTMENT (OUTPATIENT)
Dept: MRI IMAGING | Age: 88
DRG: 078 | End: 2023-09-20
Payer: MEDICARE

## 2023-09-20 PROBLEM — Z78.9 IMPAIRED MOBILITY AND ACTIVITIES OF DAILY LIVING: Status: ACTIVE | Noted: 2023-09-20

## 2023-09-20 PROBLEM — M70.70 BURSITIS OF HIP: Status: ACTIVE | Noted: 2022-08-03

## 2023-09-20 PROBLEM — I73.9 VASCULAR DISORDER OF EXTREMITY (HCC): Status: ACTIVE | Noted: 2020-05-16

## 2023-09-20 PROBLEM — I10 BENIGN ESSENTIAL HYPERTENSION: Status: ACTIVE | Noted: 2019-03-22

## 2023-09-20 PROBLEM — Z74.09 IMPAIRED MOBILITY AND ACTIVITIES OF DAILY LIVING: Status: ACTIVE | Noted: 2023-09-20

## 2023-09-20 PROBLEM — R41.82 ALTERED MENTAL STATUS: Status: ACTIVE | Noted: 2023-09-20

## 2023-09-20 PROBLEM — R00.2 PALPITATIONS: Status: ACTIVE | Noted: 2022-08-03

## 2023-09-20 PROBLEM — R13.10 DYSPHAGIA, UNSPECIFIED: Status: ACTIVE | Noted: 2018-12-21

## 2023-09-20 PROBLEM — G57.00 PIRIFORMIS SYNDROME: Status: ACTIVE | Noted: 2022-08-03

## 2023-09-20 PROBLEM — R26.89 IMPAIRMENT OF BALANCE: Status: ACTIVE | Noted: 2023-02-03

## 2023-09-20 PROBLEM — K21.00 GASTRO-ESOPHAGEAL REFLUX DISEASE WITH ESOPHAGITIS: Status: ACTIVE | Noted: 2018-12-21

## 2023-09-20 LAB
ANION GAP SERPL CALCULATED.3IONS-SCNC: 17 MEQ/L (ref 9–15)
BUN SERPL-MCNC: 27 MG/DL (ref 8–23)
CALCIUM SERPL-MCNC: 9.4 MG/DL (ref 8.5–9.9)
CHLORIDE SERPL-SCNC: 98 MEQ/L (ref 95–107)
CHOLEST SERPL-MCNC: 218 MG/DL (ref 0–199)
CO2 SERPL-SCNC: 19 MEQ/L (ref 20–31)
CREAT SERPL-MCNC: 2.02 MG/DL (ref 0.5–0.9)
EKG ATRIAL RATE: 69 BPM
EKG P AXIS: 48 DEGREES
EKG P-R INTERVAL: 144 MS
EKG Q-T INTERVAL: 416 MS
EKG QRS DURATION: 74 MS
EKG QTC CALCULATION (BAZETT): 445 MS
EKG R AXIS: 50 DEGREES
EKG T AXIS: 106 DEGREES
EKG VENTRICULAR RATE: 69 BPM
ERYTHROCYTE [DISTWIDTH] IN BLOOD BY AUTOMATED COUNT: 12.9 % (ref 11.5–14.5)
GLUCOSE SERPL-MCNC: 218 MG/DL (ref 70–99)
HBA1C MFR BLD: 8.5 % (ref 4.8–5.9)
HCT VFR BLD AUTO: 36.9 % (ref 37–47)
HDLC SERPL-MCNC: 66 MG/DL (ref 40–59)
HGB BLD-MCNC: 12.1 G/DL (ref 12–16)
LDLC SERPL CALC-MCNC: 131 MG/DL (ref 0–129)
MCH RBC QN AUTO: 29.4 PG (ref 27–31.3)
MCHC RBC AUTO-ENTMCNC: 32.8 % (ref 33–37)
MCV RBC AUTO: 89.6 FL (ref 79.4–94.8)
PLATELET # BLD AUTO: 193 K/UL (ref 130–400)
POTASSIUM SERPL-SCNC: 4.5 MEQ/L (ref 3.4–4.9)
RBC # BLD AUTO: 4.12 M/UL (ref 4.2–5.4)
SODIUM SERPL-SCNC: 134 MEQ/L (ref 135–144)
TRIGL SERPL-MCNC: 106 MG/DL (ref 0–150)
WBC # BLD AUTO: 8.6 K/UL (ref 4.8–10.8)

## 2023-09-20 PROCEDURE — 85027 COMPLETE CBC AUTOMATED: CPT

## 2023-09-20 PROCEDURE — 99291 CRITICAL CARE FIRST HOUR: CPT | Performed by: PSYCHIATRY & NEUROLOGY

## 2023-09-20 PROCEDURE — 6370000000 HC RX 637 (ALT 250 FOR IP): Performed by: PSYCHIATRY & NEUROLOGY

## 2023-09-20 PROCEDURE — 2500000003 HC RX 250 WO HCPCS: Performed by: NURSE PRACTITIONER

## 2023-09-20 PROCEDURE — 99223 1ST HOSP IP/OBS HIGH 75: CPT

## 2023-09-20 PROCEDURE — 6360000002 HC RX W HCPCS: Performed by: NURSE PRACTITIONER

## 2023-09-20 PROCEDURE — 2500000003 HC RX 250 WO HCPCS: Performed by: INTERNAL MEDICINE

## 2023-09-20 PROCEDURE — 2580000003 HC RX 258: Performed by: NURSE PRACTITIONER

## 2023-09-20 PROCEDURE — C9113 INJ PANTOPRAZOLE SODIUM, VIA: HCPCS | Performed by: INTERNAL MEDICINE

## 2023-09-20 PROCEDURE — 83036 HEMOGLOBIN GLYCOSYLATED A1C: CPT

## 2023-09-20 PROCEDURE — 36415 COLL VENOUS BLD VENIPUNCTURE: CPT

## 2023-09-20 PROCEDURE — 80061 LIPID PANEL: CPT

## 2023-09-20 PROCEDURE — 92610 EVALUATE SWALLOWING FUNCTION: CPT

## 2023-09-20 PROCEDURE — 6360000002 HC RX W HCPCS: Performed by: INTERNAL MEDICINE

## 2023-09-20 PROCEDURE — 2000000000 HC ICU R&B

## 2023-09-20 PROCEDURE — 70551 MRI BRAIN STEM W/O DYE: CPT

## 2023-09-20 PROCEDURE — 93010 ELECTROCARDIOGRAM REPORT: CPT | Performed by: INTERNAL MEDICINE

## 2023-09-20 PROCEDURE — 99291 CRITICAL CARE FIRST HOUR: CPT | Performed by: INTERNAL MEDICINE

## 2023-09-20 PROCEDURE — 92523 SPEECH SOUND LANG COMPREHEN: CPT

## 2023-09-20 PROCEDURE — 97166 OT EVAL MOD COMPLEX 45 MIN: CPT

## 2023-09-20 PROCEDURE — 80048 BASIC METABOLIC PNL TOTAL CA: CPT

## 2023-09-20 PROCEDURE — 97167 OT EVAL HIGH COMPLEX 60 MIN: CPT

## 2023-09-20 PROCEDURE — 2580000003 HC RX 258: Performed by: INTERNAL MEDICINE

## 2023-09-20 PROCEDURE — 97163 PT EVAL HIGH COMPLEX 45 MIN: CPT

## 2023-09-20 RX ORDER — LOSARTAN POTASSIUM 25 MG/1
50 TABLET ORAL DAILY
Status: DISCONTINUED | OUTPATIENT
Start: 2023-09-20 | End: 2023-09-27 | Stop reason: HOSPADM

## 2023-09-20 RX ORDER — LABETALOL HYDROCHLORIDE 5 MG/ML
20 INJECTION, SOLUTION INTRAVENOUS EVERY 4 HOURS PRN
Status: DISCONTINUED | OUTPATIENT
Start: 2023-09-20 | End: 2023-09-27 | Stop reason: HOSPADM

## 2023-09-20 RX ORDER — OXYBUTYNIN CHLORIDE 5 MG/1
10 TABLET, EXTENDED RELEASE ORAL EVERY EVENING
Status: DISCONTINUED | OUTPATIENT
Start: 2023-09-20 | End: 2023-09-27 | Stop reason: HOSPADM

## 2023-09-20 RX ORDER — TAMSULOSIN HYDROCHLORIDE 0.4 MG/1
0.4 CAPSULE ORAL EVERY EVENING
Status: DISCONTINUED | OUTPATIENT
Start: 2023-09-20 | End: 2023-09-27 | Stop reason: HOSPADM

## 2023-09-20 RX ORDER — DULOXETIN HYDROCHLORIDE 20 MG/1
20 CAPSULE, DELAYED RELEASE ORAL DAILY
Status: DISCONTINUED | OUTPATIENT
Start: 2023-09-20 | End: 2023-09-27 | Stop reason: HOSPADM

## 2023-09-20 RX ORDER — ALPRAZOLAM 0.5 MG/1
0.5 TABLET ORAL ONCE
Status: COMPLETED | OUTPATIENT
Start: 2023-09-20 | End: 2023-09-20

## 2023-09-20 RX ADMIN — ENOXAPARIN SODIUM 30 MG: 100 INJECTION SUBCUTANEOUS at 20:45

## 2023-09-20 RX ADMIN — PANTOPRAZOLE SODIUM 40 MG: 40 INJECTION, POWDER, FOR SOLUTION INTRAVENOUS at 08:41

## 2023-09-20 RX ADMIN — SODIUM CHLORIDE 10 MG/HR: 9 INJECTION, SOLUTION INTRAVENOUS at 19:21

## 2023-09-20 RX ADMIN — SODIUM CHLORIDE 2.5 MG/HR: 9 INJECTION, SOLUTION INTRAVENOUS at 05:51

## 2023-09-20 RX ADMIN — LABETALOL HYDROCHLORIDE 20 MG: 5 INJECTION, SOLUTION INTRAVENOUS at 13:17

## 2023-09-20 RX ADMIN — ALPRAZOLAM 0.5 MG: 0.5 TABLET ORAL at 16:32

## 2023-09-20 ASSESSMENT — ENCOUNTER SYMPTOMS
CHOKING: 0
BOWEL INCONTINENCE: 0
VOMITING: 0
PHOTOPHOBIA: 0
CONSTIPATION: 1
BACK PAIN: 1
EYE PAIN: 0
WHEEZING: 0
TROUBLE SWALLOWING: 0
BLOOD IN STOOL: 0
DIARRHEA: 0
NAUSEA: 0
ABDOMINAL PAIN: 0
SHORTNESS OF BREATH: 0
SORE THROAT: 0
BACK PAIN: 0
COLOR CHANGE: 0
COUGH: 0
EYE REDNESS: 0
STRIDOR: 0
SHORTNESS OF BREATH: 1

## 2023-09-20 ASSESSMENT — PAIN SCALES - GENERAL
PAINLEVEL_OUTOF10: 0

## 2023-09-20 ASSESSMENT — PAIN SCALES - WONG BAKER
WONGBAKER_NUMERICALRESPONSE: 0

## 2023-09-20 NOTE — ED NOTES
[facility] called at [phone]  He returned the call at [facility]  09/19/23 [address] [address] [address]  09/19/23 3875
Dr Jimi Weber at [facility], RN  09/19/23 7699
Dr Milind Kendall made aware of [address] [facility], RN  09/19/23 8193
Per Dr Satira Schwab and trauma PA, no tenderness noted to C-spine or anterior chest. No obvious signs of trauma noted to chest. Pt able to follow simple commands. Right sided facial droop noted. Slight weakness noted to right hand and leg. Pt not making sense verbally. Tenderness noted to right hip with palpation but able to lift right leg and hold it up for 5 seconds without drift. Pelvis stable. Pt cries when you touch her in most areas. Legs appear WNL. No tenderness or step-off noted to lumbar spine upon log rolling her.       Anjana Rodriguez RN  09/19/23 5939
Perfect served [facility] [employer] @[phone]. Laurence Choudhary responded @ [phone].      Jono Prather  09/19/23 0329
Pt had 1 episode of emesis. Dr. Shandra Dasilva updated.        Keyanna Jc, RN  09/19/23 3045
Returned. Remains on monitor. No change in assessment. Aware we are waiting on results. Call light within reach. No acute distress noted at this time.       Dale Johnson RN  09/19/23 1500
To CT via cart with this RN and monitor     Rajendra Saini, ROSALIND  09/19/23 6961
Trauma PA at bedside     Rajendra Saini RN  09/19/23 2041
they set up her pills in pill box for her each week.       Thank you,    Russel Carvalho, PharmD  EM Clinical Pharmacist  [phone]  9/19/2023 4:26 PM    Clinical Pharmacy Admission Med Reconciliation/ Review Tracking    Total # of Interventions: 7   Decreased dose 2, Discontinued meds 2, and Missing medications 3  Interventions requiring provider assessment:   NA    Time Spent (min): 30

## 2023-09-21 ENCOUNTER — APPOINTMENT (OUTPATIENT)
Dept: CT IMAGING | Age: 88
DRG: 078 | End: 2023-09-21
Payer: MEDICARE

## 2023-09-21 PROBLEM — Z71.89 ADVANCED CARE PLANNING/COUNSELING DISCUSSION: Status: ACTIVE | Noted: 2023-09-21

## 2023-09-21 PROBLEM — Z71.89 GOALS OF CARE, COUNSELING/DISCUSSION: Status: ACTIVE | Noted: 2023-09-21

## 2023-09-21 PROBLEM — Z51.5 PALLIATIVE CARE ENCOUNTER: Status: ACTIVE | Noted: 2023-09-21

## 2023-09-21 LAB
ANION GAP SERPL CALCULATED.3IONS-SCNC: 12 MEQ/L (ref 9–15)
BASOPHILS # BLD: 0 K/UL (ref 0–0.2)
BASOPHILS NFR BLD: 0.3 %
BUN SERPL-MCNC: 25 MG/DL (ref 8–23)
CALCIUM SERPL-MCNC: 9 MG/DL (ref 8.5–9.9)
CHLORIDE SERPL-SCNC: 103 MEQ/L (ref 95–107)
CO2 SERPL-SCNC: 23 MEQ/L (ref 20–31)
CREAT SERPL-MCNC: 2.04 MG/DL (ref 0.5–0.9)
EOSINOPHIL # BLD: 0.1 K/UL (ref 0–0.7)
EOSINOPHIL NFR BLD: 1.1 %
ERYTHROCYTE [DISTWIDTH] IN BLOOD BY AUTOMATED COUNT: 13.1 % (ref 11.5–14.5)
GLUCOSE SERPL-MCNC: 157 MG/DL (ref 70–99)
HCT VFR BLD AUTO: 34.6 % (ref 37–47)
HGB BLD-MCNC: 11.5 G/DL (ref 12–16)
LYMPHOCYTES # BLD: 0.9 K/UL (ref 1–4.8)
LYMPHOCYTES NFR BLD: 10.4 %
MCH RBC QN AUTO: 30 PG (ref 27–31.3)
MCHC RBC AUTO-ENTMCNC: 33.2 % (ref 33–37)
MCV RBC AUTO: 90.3 FL (ref 79.4–94.8)
MONOCYTES # BLD: 0.8 K/UL (ref 0.2–0.8)
MONOCYTES NFR BLD: 8.5 %
NEUTROPHILS # BLD: 7.1 K/UL (ref 1.4–6.5)
NEUTS SEG NFR BLD: 79.5 %
PERFORMED ON: ABNORMAL
PLATELET # BLD AUTO: 178 K/UL (ref 130–400)
POC CREATININE: 2.2 MG/DL (ref 0.6–1.2)
POC SAMPLE TYPE: ABNORMAL
POTASSIUM SERPL-SCNC: 4.6 MEQ/L (ref 3.4–4.9)
RBC # BLD AUTO: 3.83 M/UL (ref 4.2–5.4)
SODIUM SERPL-SCNC: 138 MEQ/L (ref 135–144)
WBC # BLD AUTO: 9 K/UL (ref 4.8–10.8)

## 2023-09-21 PROCEDURE — 99222 1ST HOSP IP/OBS MODERATE 55: CPT | Performed by: PHYSICAL MEDICINE & REHABILITATION

## 2023-09-21 PROCEDURE — 51701 INSERT BLADDER CATHETER: CPT

## 2023-09-21 PROCEDURE — 51798 US URINE CAPACITY MEASURE: CPT

## 2023-09-21 PROCEDURE — 80048 BASIC METABOLIC PNL TOTAL CA: CPT

## 2023-09-21 PROCEDURE — 6360000002 HC RX W HCPCS: Performed by: NURSE PRACTITIONER

## 2023-09-21 PROCEDURE — 2580000003 HC RX 258: Performed by: INTERNAL MEDICINE

## 2023-09-21 PROCEDURE — 85025 COMPLETE CBC W/AUTO DIFF WBC: CPT

## 2023-09-21 PROCEDURE — 2000000000 HC ICU R&B

## 2023-09-21 PROCEDURE — 99291 CRITICAL CARE FIRST HOUR: CPT | Performed by: INTERNAL MEDICINE

## 2023-09-21 PROCEDURE — 6360000002 HC RX W HCPCS: Performed by: INTERNAL MEDICINE

## 2023-09-21 PROCEDURE — 6370000000 HC RX 637 (ALT 250 FOR IP): Performed by: INTERNAL MEDICINE

## 2023-09-21 PROCEDURE — 36415 COLL VENOUS BLD VENIPUNCTURE: CPT

## 2023-09-21 PROCEDURE — 97535 SELF CARE MNGMENT TRAINING: CPT

## 2023-09-21 PROCEDURE — C9113 INJ PANTOPRAZOLE SODIUM, VIA: HCPCS | Performed by: INTERNAL MEDICINE

## 2023-09-21 PROCEDURE — 70450 CT HEAD/BRAIN W/O DYE: CPT

## 2023-09-21 PROCEDURE — 99232 SBSQ HOSP IP/OBS MODERATE 35: CPT | Performed by: PSYCHIATRY & NEUROLOGY

## 2023-09-21 PROCEDURE — 92507 TX SP LANG VOICE COMM INDIV: CPT

## 2023-09-21 PROCEDURE — 99232 SBSQ HOSP IP/OBS MODERATE 35: CPT

## 2023-09-21 PROCEDURE — 92526 ORAL FUNCTION THERAPY: CPT

## 2023-09-21 RX ORDER — HYDRALAZINE HYDROCHLORIDE 20 MG/ML
20 INJECTION INTRAMUSCULAR; INTRAVENOUS EVERY 4 HOURS PRN
Status: DISCONTINUED | OUTPATIENT
Start: 2023-09-21 | End: 2023-09-27 | Stop reason: HOSPADM

## 2023-09-21 RX ORDER — HYDRALAZINE HYDROCHLORIDE 25 MG/1
25 TABLET, FILM COATED ORAL EVERY 8 HOURS SCHEDULED
Status: DISCONTINUED | OUTPATIENT
Start: 2023-09-21 | End: 2023-09-27 | Stop reason: HOSPADM

## 2023-09-21 RX ORDER — AMLODIPINE BESYLATE 10 MG/1
10 TABLET ORAL DAILY
Status: DISCONTINUED | OUTPATIENT
Start: 2023-09-21 | End: 2023-09-27 | Stop reason: HOSPADM

## 2023-09-21 RX ADMIN — TAMSULOSIN HYDROCHLORIDE 0.4 MG: 0.4 CAPSULE ORAL at 17:05

## 2023-09-21 RX ADMIN — LOSARTAN POTASSIUM 50 MG: 50 TABLET, FILM COATED ORAL at 08:03

## 2023-09-21 RX ADMIN — HYDRALAZINE HYDROCHLORIDE 25 MG: 25 TABLET, FILM COATED ORAL at 11:17

## 2023-09-21 RX ADMIN — Medication 10 ML: at 20:57

## 2023-09-21 RX ADMIN — DULOXETINE HYDROCHLORIDE 20 MG: 20 CAPSULE, DELAYED RELEASE ORAL at 08:03

## 2023-09-21 RX ADMIN — PANTOPRAZOLE SODIUM 40 MG: 40 INJECTION, POWDER, FOR SOLUTION INTRAVENOUS at 08:04

## 2023-09-21 RX ADMIN — LABETALOL HYDROCHLORIDE 20 MG: 5 INJECTION, SOLUTION INTRAVENOUS at 17:05

## 2023-09-21 RX ADMIN — ENOXAPARIN SODIUM 30 MG: 100 INJECTION SUBCUTANEOUS at 08:04

## 2023-09-21 RX ADMIN — ASPIRIN 81 MG 81 MG: 81 TABLET ORAL at 08:04

## 2023-09-21 RX ADMIN — HYDRALAZINE HYDROCHLORIDE 25 MG: 25 TABLET, FILM COATED ORAL at 20:47

## 2023-09-21 RX ADMIN — AMLODIPINE BESYLATE 10 MG: 10 TABLET ORAL at 11:17

## 2023-09-21 RX ADMIN — Medication 10 ML: at 08:04

## 2023-09-21 RX ADMIN — LABETALOL HYDROCHLORIDE 20 MG: 5 INJECTION, SOLUTION INTRAVENOUS at 12:03

## 2023-09-21 RX ADMIN — OXYBUTYNIN CHLORIDE 10 MG: 5 TABLET, EXTENDED RELEASE ORAL at 17:05

## 2023-09-21 ASSESSMENT — PAIN SCALES - WONG BAKER

## 2023-09-21 ASSESSMENT — PAIN SCALES - GENERAL
PAINLEVEL_OUTOF10: 0

## 2023-09-21 ASSESSMENT — ENCOUNTER SYMPTOMS
COUGH: 0
SHORTNESS OF BREATH: 0
DIARRHEA: 0

## 2023-09-21 NOTE — FLOWSHEET NOTE
Assumed care of patient at 1200, assessment complete, see flow sheet. SBP remains elevated at times, Labetalol given as ordered per prn order, see MAR. Patient requesting bedpan frequently but only voided 100cc, dark mirza cloudy urine,bladder scan done for 567cc, straight cath done and obtained 450cc, cloudy mirza urine. Patients daughter remains at bedside and patient resting without complaints.

## 2023-09-22 LAB
ANION GAP SERPL CALCULATED.3IONS-SCNC: 9 MEQ/L (ref 9–15)
BASOPHILS # BLD: 0 K/UL (ref 0–0.2)
BASOPHILS NFR BLD: 0.1 %
BUN SERPL-MCNC: 28 MG/DL (ref 8–23)
CALCIUM SERPL-MCNC: 8.9 MG/DL (ref 8.5–9.9)
CHLORIDE SERPL-SCNC: 96 MEQ/L (ref 95–107)
CO2 SERPL-SCNC: 25 MEQ/L (ref 20–31)
CREAT SERPL-MCNC: 2.08 MG/DL (ref 0.5–0.9)
EOSINOPHIL # BLD: 0.2 K/UL (ref 0–0.7)
EOSINOPHIL NFR BLD: 2.7 %
ERYTHROCYTE [DISTWIDTH] IN BLOOD BY AUTOMATED COUNT: 12.5 % (ref 11.5–14.5)
GLUCOSE BLD-MCNC: 206 MG/DL (ref 70–99)
GLUCOSE BLD-MCNC: 477 MG/DL (ref 70–99)
GLUCOSE BLD-MCNC: 49 MG/DL (ref 70–99)
GLUCOSE BLD-MCNC: 80 MG/DL (ref 70–99)
GLUCOSE SERPL-MCNC: 397 MG/DL (ref 70–99)
HCT VFR BLD AUTO: 32.4 % (ref 37–47)
HGB BLD-MCNC: 10.8 G/DL (ref 12–16)
LYMPHOCYTES # BLD: 1.4 K/UL (ref 1–4.8)
LYMPHOCYTES NFR BLD: 20.8 %
MCH RBC QN AUTO: 29.3 PG (ref 27–31.3)
MCHC RBC AUTO-ENTMCNC: 33.3 % (ref 33–37)
MCV RBC AUTO: 88 FL (ref 79.4–94.8)
MONOCYTES # BLD: 0.7 K/UL (ref 0.2–0.8)
MONOCYTES NFR BLD: 10.5 %
NEUTROPHILS # BLD: 4.5 K/UL (ref 1.4–6.5)
NEUTS SEG NFR BLD: 65.5 %
PERFORMED ON: ABNORMAL
PERFORMED ON: NORMAL
PLATELET # BLD AUTO: 157 K/UL (ref 130–400)
POTASSIUM SERPL-SCNC: 4.2 MEQ/L (ref 3.4–4.9)
RBC # BLD AUTO: 3.68 M/UL (ref 4.2–5.4)
SODIUM SERPL-SCNC: 130 MEQ/L (ref 135–144)
WBC # BLD AUTO: 6.9 K/UL (ref 4.8–10.8)

## 2023-09-22 PROCEDURE — 1210000000 HC MED SURG R&B

## 2023-09-22 PROCEDURE — 99232 SBSQ HOSP IP/OBS MODERATE 35: CPT

## 2023-09-22 PROCEDURE — 2580000003 HC RX 258: Performed by: INTERNAL MEDICINE

## 2023-09-22 PROCEDURE — 80048 BASIC METABOLIC PNL TOTAL CA: CPT

## 2023-09-22 PROCEDURE — 36415 COLL VENOUS BLD VENIPUNCTURE: CPT

## 2023-09-22 PROCEDURE — 99232 SBSQ HOSP IP/OBS MODERATE 35: CPT | Performed by: INTERNAL MEDICINE

## 2023-09-22 PROCEDURE — 99232 SBSQ HOSP IP/OBS MODERATE 35: CPT | Performed by: PHYSICAL MEDICINE & REHABILITATION

## 2023-09-22 PROCEDURE — 51798 US URINE CAPACITY MEASURE: CPT

## 2023-09-22 PROCEDURE — 51701 INSERT BLADDER CATHETER: CPT

## 2023-09-22 PROCEDURE — C9113 INJ PANTOPRAZOLE SODIUM, VIA: HCPCS | Performed by: INTERNAL MEDICINE

## 2023-09-22 PROCEDURE — 6370000000 HC RX 637 (ALT 250 FOR IP): Performed by: NURSE PRACTITIONER

## 2023-09-22 PROCEDURE — 6370000000 HC RX 637 (ALT 250 FOR IP): Performed by: INTERNAL MEDICINE

## 2023-09-22 PROCEDURE — 97116 GAIT TRAINING THERAPY: CPT

## 2023-09-22 PROCEDURE — 99232 SBSQ HOSP IP/OBS MODERATE 35: CPT | Performed by: PSYCHIATRY & NEUROLOGY

## 2023-09-22 PROCEDURE — 85025 COMPLETE CBC W/AUTO DIFF WBC: CPT

## 2023-09-22 PROCEDURE — 6360000002 HC RX W HCPCS: Performed by: INTERNAL MEDICINE

## 2023-09-22 RX ORDER — ROSUVASTATIN CALCIUM 10 MG/1
10 TABLET, COATED ORAL NIGHTLY
Qty: 30 TABLET | Refills: 3 | Status: SHIPPED | OUTPATIENT
Start: 2023-09-22

## 2023-09-22 RX ORDER — INSULIN LISPRO 100 [IU]/ML
5 INJECTION, SOLUTION INTRAVENOUS; SUBCUTANEOUS
Status: DISCONTINUED | OUTPATIENT
Start: 2023-09-22 | End: 2023-09-27 | Stop reason: HOSPADM

## 2023-09-22 RX ORDER — ROSUVASTATIN CALCIUM 10 MG/1
10 TABLET, COATED ORAL NIGHTLY
Status: DISCONTINUED | OUTPATIENT
Start: 2023-09-22 | End: 2023-09-22

## 2023-09-22 RX ORDER — INSULIN LISPRO 100 [IU]/ML
0-4 INJECTION, SOLUTION INTRAVENOUS; SUBCUTANEOUS NIGHTLY
Qty: 30 EACH | Refills: 0 | Status: SHIPPED | OUTPATIENT
Start: 2023-09-22

## 2023-09-22 RX ORDER — INSULIN LISPRO 100 [IU]/ML
0-4 INJECTION, SOLUTION INTRAVENOUS; SUBCUTANEOUS NIGHTLY
Status: DISCONTINUED | OUTPATIENT
Start: 2023-09-22 | End: 2023-09-27 | Stop reason: HOSPADM

## 2023-09-22 RX ORDER — ASPIRIN 81 MG/1
81 TABLET, CHEWABLE ORAL DAILY
Qty: 30 TABLET | Refills: 3 | Status: SHIPPED | OUTPATIENT
Start: 2023-09-23

## 2023-09-22 RX ORDER — AMLODIPINE BESYLATE 10 MG/1
10 TABLET ORAL DAILY
Qty: 30 TABLET | Refills: 3 | Status: SHIPPED | OUTPATIENT
Start: 2023-09-23

## 2023-09-22 RX ORDER — INSULIN LISPRO 100 [IU]/ML
0-8 INJECTION, SOLUTION INTRAVENOUS; SUBCUTANEOUS
Status: DISCONTINUED | OUTPATIENT
Start: 2023-09-22 | End: 2023-09-27 | Stop reason: HOSPADM

## 2023-09-22 RX ORDER — HYDRALAZINE HYDROCHLORIDE 25 MG/1
25 TABLET, FILM COATED ORAL EVERY 8 HOURS SCHEDULED
Qty: 90 TABLET | Refills: 3 | Status: SHIPPED | OUTPATIENT
Start: 2023-09-22

## 2023-09-22 RX ORDER — ROSUVASTATIN CALCIUM 10 MG/1
10 TABLET, COATED ORAL NIGHTLY
Status: DISCONTINUED | OUTPATIENT
Start: 2023-09-22 | End: 2023-09-27 | Stop reason: HOSPADM

## 2023-09-22 RX ORDER — INSULIN LISPRO 100 [IU]/ML
0-8 INJECTION, SOLUTION INTRAVENOUS; SUBCUTANEOUS
Qty: 90 EACH | Refills: 1 | Status: SHIPPED | OUTPATIENT
Start: 2023-09-22

## 2023-09-22 RX ORDER — DEXTROSE MONOHYDRATE 100 MG/ML
INJECTION, SOLUTION INTRAVENOUS CONTINUOUS PRN
Status: DISCONTINUED | OUTPATIENT
Start: 2023-09-22 | End: 2023-09-27 | Stop reason: HOSPADM

## 2023-09-22 RX ADMIN — Medication 10 ML: at 21:13

## 2023-09-22 RX ADMIN — INSULIN LISPRO 5 UNITS: 100 INJECTION, SOLUTION INTRAVENOUS; SUBCUTANEOUS at 12:35

## 2023-09-22 RX ADMIN — OXYBUTYNIN CHLORIDE 10 MG: 5 TABLET, EXTENDED RELEASE ORAL at 17:53

## 2023-09-22 RX ADMIN — ROSUVASTATIN CALCIUM 10 MG: 10 TABLET, FILM COATED ORAL at 21:11

## 2023-09-22 RX ADMIN — DULOXETINE HYDROCHLORIDE 20 MG: 20 CAPSULE, DELAYED RELEASE ORAL at 09:35

## 2023-09-22 RX ADMIN — INSULIN LISPRO 8 UNITS: 100 INJECTION, SOLUTION INTRAVENOUS; SUBCUTANEOUS at 12:34

## 2023-09-22 RX ADMIN — LOSARTAN POTASSIUM 50 MG: 50 TABLET, FILM COATED ORAL at 09:35

## 2023-09-22 RX ADMIN — ASPIRIN 81 MG 81 MG: 81 TABLET ORAL at 09:40

## 2023-09-22 RX ADMIN — AMLODIPINE BESYLATE 10 MG: 10 TABLET ORAL at 09:35

## 2023-09-22 RX ADMIN — PANTOPRAZOLE SODIUM 40 MG: 40 INJECTION, POWDER, FOR SOLUTION INTRAVENOUS at 09:35

## 2023-09-22 RX ADMIN — INSULIN LISPRO 5 UNITS: 100 INJECTION, SOLUTION INTRAVENOUS; SUBCUTANEOUS at 17:55

## 2023-09-22 RX ADMIN — HYDRALAZINE HYDROCHLORIDE 25 MG: 25 TABLET, FILM COATED ORAL at 06:31

## 2023-09-22 RX ADMIN — INSULIN LISPRO 2 UNITS: 100 INJECTION, SOLUTION INTRAVENOUS; SUBCUTANEOUS at 17:56

## 2023-09-22 RX ADMIN — ENOXAPARIN SODIUM 30 MG: 100 INJECTION SUBCUTANEOUS at 09:38

## 2023-09-22 RX ADMIN — HYDRALAZINE HYDROCHLORIDE 25 MG: 25 TABLET, FILM COATED ORAL at 14:37

## 2023-09-22 RX ADMIN — Medication 10 ML: at 09:39

## 2023-09-22 RX ADMIN — TAMSULOSIN HYDROCHLORIDE 0.4 MG: 0.4 CAPSULE ORAL at 17:53

## 2023-09-22 ASSESSMENT — PAIN SCALES - WONG BAKER
WONGBAKER_NUMERICALRESPONSE: 0

## 2023-09-22 ASSESSMENT — PAIN SCALES - GENERAL
PAINLEVEL_OUTOF10: 0
PAINLEVEL_OUTOF10: 0

## 2023-09-23 ENCOUNTER — APPOINTMENT (OUTPATIENT)
Dept: CT IMAGING | Age: 88
DRG: 078 | End: 2023-09-23
Payer: MEDICARE

## 2023-09-23 LAB
ALBUMIN SERPL-MCNC: 3.3 G/DL (ref 3.5–4.6)
ANION GAP SERPL CALCULATED.3IONS-SCNC: 12 MEQ/L (ref 9–15)
BASOPHILS # BLD: 0 K/UL (ref 0–0.2)
BASOPHILS NFR BLD: 0.2 %
BUN SERPL-MCNC: 32 MG/DL (ref 8–23)
CALCIUM SERPL-MCNC: 8.8 MG/DL (ref 8.5–9.9)
CHLORIDE SERPL-SCNC: 95 MEQ/L (ref 95–107)
CO2 SERPL-SCNC: 22 MEQ/L (ref 20–31)
CREAT SERPL-MCNC: 2.38 MG/DL (ref 0.5–0.9)
EOSINOPHIL # BLD: 0.1 K/UL (ref 0–0.7)
EOSINOPHIL NFR BLD: 1.7 %
ERYTHROCYTE [DISTWIDTH] IN BLOOD BY AUTOMATED COUNT: 12.6 % (ref 11.5–14.5)
GLUCOSE BLD-MCNC: 155 MG/DL (ref 70–99)
GLUCOSE BLD-MCNC: 183 MG/DL (ref 70–99)
GLUCOSE BLD-MCNC: 205 MG/DL (ref 70–99)
GLUCOSE BLD-MCNC: 342 MG/DL (ref 70–99)
GLUCOSE BLD-MCNC: 70 MG/DL (ref 70–99)
GLUCOSE SERPL-MCNC: 170 MG/DL (ref 70–99)
HCT VFR BLD AUTO: 34.2 % (ref 37–47)
HGB BLD-MCNC: 11.5 G/DL (ref 12–16)
LYMPHOCYTES # BLD: 1.5 K/UL (ref 1–4.8)
LYMPHOCYTES NFR BLD: 18.1 %
MCH RBC QN AUTO: 29.4 PG (ref 27–31.3)
MCHC RBC AUTO-ENTMCNC: 33.6 % (ref 33–37)
MCV RBC AUTO: 87.5 FL (ref 79.4–94.8)
MONOCYTES # BLD: 0.6 K/UL (ref 0.2–0.8)
MONOCYTES NFR BLD: 6.9 %
NEUTROPHILS # BLD: 5.9 K/UL (ref 1.4–6.5)
NEUTS SEG NFR BLD: 72.9 %
PERFORMED ON: ABNORMAL
PERFORMED ON: NORMAL
PHOSPHATE SERPL-MCNC: 2.8 MG/DL (ref 2.3–4.8)
PLATELET # BLD AUTO: 203 K/UL (ref 130–400)
POTASSIUM SERPL-SCNC: 4.8 MEQ/L (ref 3.4–4.9)
RBC # BLD AUTO: 3.91 M/UL (ref 4.2–5.4)
SODIUM SERPL-SCNC: 129 MEQ/L (ref 135–144)
WBC # BLD AUTO: 8.1 K/UL (ref 4.8–10.8)

## 2023-09-23 PROCEDURE — 80069 RENAL FUNCTION PANEL: CPT

## 2023-09-23 PROCEDURE — 99232 SBSQ HOSP IP/OBS MODERATE 35: CPT | Performed by: PSYCHIATRY & NEUROLOGY

## 2023-09-23 PROCEDURE — 2580000003 HC RX 258: Performed by: INTERNAL MEDICINE

## 2023-09-23 PROCEDURE — 6360000002 HC RX W HCPCS: Performed by: INTERNAL MEDICINE

## 2023-09-23 PROCEDURE — 93005 ELECTROCARDIOGRAM TRACING: CPT | Performed by: INTERNAL MEDICINE

## 2023-09-23 PROCEDURE — 1210000000 HC MED SURG R&B

## 2023-09-23 PROCEDURE — 36415 COLL VENOUS BLD VENIPUNCTURE: CPT

## 2023-09-23 PROCEDURE — C9113 INJ PANTOPRAZOLE SODIUM, VIA: HCPCS | Performed by: INTERNAL MEDICINE

## 2023-09-23 PROCEDURE — 6370000000 HC RX 637 (ALT 250 FOR IP): Performed by: INTERNAL MEDICINE

## 2023-09-23 PROCEDURE — 6370000000 HC RX 637 (ALT 250 FOR IP): Performed by: NURSE PRACTITIONER

## 2023-09-23 PROCEDURE — 85025 COMPLETE CBC W/AUTO DIFF WBC: CPT

## 2023-09-23 PROCEDURE — 70450 CT HEAD/BRAIN W/O DYE: CPT

## 2023-09-23 RX ORDER — SODIUM CHLORIDE 9 MG/ML
INJECTION, SOLUTION INTRAVENOUS CONTINUOUS
Status: DISPENSED | OUTPATIENT
Start: 2023-09-23 | End: 2023-09-23

## 2023-09-23 RX ADMIN — HYDRALAZINE HYDROCHLORIDE 25 MG: 25 TABLET, FILM COATED ORAL at 14:33

## 2023-09-23 RX ADMIN — ENOXAPARIN SODIUM 30 MG: 100 INJECTION SUBCUTANEOUS at 08:58

## 2023-09-23 RX ADMIN — HYDRALAZINE HYDROCHLORIDE 25 MG: 25 TABLET, FILM COATED ORAL at 05:55

## 2023-09-23 RX ADMIN — PANTOPRAZOLE SODIUM 40 MG: 40 INJECTION, POWDER, FOR SOLUTION INTRAVENOUS at 08:58

## 2023-09-23 RX ADMIN — INSULIN LISPRO 6 UNITS: 100 INJECTION, SOLUTION INTRAVENOUS; SUBCUTANEOUS at 12:45

## 2023-09-23 RX ADMIN — Medication 5 ML: at 08:51

## 2023-09-23 RX ADMIN — HYDRALAZINE HYDROCHLORIDE 25 MG: 25 TABLET, FILM COATED ORAL at 21:44

## 2023-09-23 RX ADMIN — AMLODIPINE BESYLATE 10 MG: 10 TABLET ORAL at 08:57

## 2023-09-23 RX ADMIN — SODIUM CHLORIDE: 9 INJECTION, SOLUTION INTRAVENOUS at 12:45

## 2023-09-23 RX ADMIN — DULOXETINE HYDROCHLORIDE 20 MG: 20 CAPSULE, DELAYED RELEASE ORAL at 08:57

## 2023-09-23 RX ADMIN — INSULIN LISPRO 5 UNITS: 100 INJECTION, SOLUTION INTRAVENOUS; SUBCUTANEOUS at 12:45

## 2023-09-23 RX ADMIN — ASPIRIN 81 MG 81 MG: 81 TABLET ORAL at 08:57

## 2023-09-23 RX ADMIN — ROSUVASTATIN CALCIUM 10 MG: 10 TABLET, FILM COATED ORAL at 21:44

## 2023-09-23 RX ADMIN — LOSARTAN POTASSIUM 50 MG: 50 TABLET, FILM COATED ORAL at 08:57

## 2023-09-23 ASSESSMENT — ENCOUNTER SYMPTOMS
DIARRHEA: 0
SHORTNESS OF BREATH: 0
COUGH: 0

## 2023-09-23 NOTE — PLAN OF CARE
BSE/SLE completed.
Problem: Discharge Planning  Goal: Discharge to home or other facility with appropriate resources  9/22/2023 1141 by Ne Lynne RN  Outcome: Progressing  9/21/2023 2247 by Chhaya Galeana RN  Outcome: Progressing  Flowsheets  Taken 9/21/2023 2000 by Chhaya Galeana RN  Discharge to home or other facility with appropriate resources:   Identify barriers to discharge with patient and caregiver   Arrange for needed discharge resources and transportation as appropriate  Taken 9/21/2023 1200 by Nithin Currie RN  Discharge to home or other facility with appropriate resources: Identify barriers to discharge with patient and caregiver     Problem: Pain  Goal: Verbalizes/displays adequate comfort level or baseline comfort level  9/22/2023 1141 by Ne Lynne RN  Outcome: Progressing  9/21/2023 2247 by Chhaya Galeana RN  Outcome: Progressing  Flowsheets (Taken 9/21/2023 2000)  Verbalizes/displays adequate comfort level or baseline comfort level:   Encourage patient to monitor pain and request assistance   Administer analgesics based on type and severity of pain and evaluate response     Problem: Safety - Adult  Goal: Free from fall injury  9/22/2023 1141 by Ne Lynne RN  Outcome: Progressing  9/21/2023 2247 by Chhaya Galeana RN  Outcome: Progressing     Problem: Skin/Tissue Integrity  Goal: Absence of new skin breakdown  Description: 1. Monitor for areas of redness and/or skin breakdown  2. Assess vascular access sites hourly  3. Every 4-6 hours minimum:  Change oxygen saturation probe site  4. Every 4-6 hours:  If on nasal continuous positive airway pressure, respiratory therapy assess nares and determine need for appliance change or resting period.   9/22/2023 1141 by Ne Lynne RN  Outcome: Progressing  9/21/2023 2247 by Chhaya Galeana RN  Outcome: Progressing     Problem: Chronic Conditions and Co-morbidities  Goal: Patient's chronic conditions and co-morbidity symptoms are monitored and maintained or
Problem: Discharge Planning  Goal: Discharge to home or other facility with appropriate resources  Outcome: Progressing     Problem: Pain  Goal: Verbalizes/displays adequate comfort level or baseline comfort level  Outcome: Progressing  Flowsheets (Taken 9/20/2023 0000)  Verbalizes/displays adequate comfort level or baseline comfort level:   Encourage patient to monitor pain and request assistance   Assess pain using appropriate pain scale     Problem: Safety - Adult  Goal: Free from fall injury  Outcome: Progressing     Problem: Skin/Tissue Integrity  Goal: Absence of new skin breakdown  Description: 1. Monitor for areas of redness and/or skin breakdown  2. Assess vascular access sites hourly  3. Every 4-6 hours minimum:  Change oxygen saturation probe site  4. Every 4-6 hours:  If on nasal continuous positive airway pressure, respiratory therapy assess nares and determine need for appliance change or resting period.   Outcome: Progressing
Problem: Discharge Planning  Goal: Discharge to home or other facility with appropriate resources  Outcome: Progressing  Flowsheets (Taken 9/22/2023 1930)  Discharge to home or other facility with appropriate resources:   Identify barriers to discharge with patient and caregiver   Arrange for needed discharge resources and transportation as appropriate   Identify discharge learning needs (meds, wound care, etc)   Refer to discharge planning if patient needs post-hospital services based on physician order or complex needs related to functional status, cognitive ability or social support system     Problem: Pain  Goal: Verbalizes/displays adequate comfort level or baseline comfort level  Outcome: Progressing     Problem: Safety - Adult  Goal: Free from fall injury  Outcome: Progressing  Flowsheets (Taken 9/22/2023 2344)  Free From Fall Injury: Instruct family/caregiver on patient safety     Problem: Skin/Tissue Integrity  Goal: Absence of new skin breakdown  Description: 1. Monitor for areas of redness and/or skin breakdown  2. Assess vascular access sites hourly  3. Every 4-6 hours minimum:  Change oxygen saturation probe site  4. Every 4-6 hours:  If on nasal continuous positive airway pressure, respiratory therapy assess nares and determine need for appliance change or resting period.   Outcome: Adequate for Discharge     Problem: Chronic Conditions and Co-morbidities  Goal: Patient's chronic conditions and co-morbidity symptoms are monitored and maintained or improved  Outcome: Progressing  Flowsheets (Taken 9/22/2023 1930)  Care Plan - Patient's Chronic Conditions and Co-Morbidity Symptoms are Monitored and Maintained or Improved:   Monitor and assess patient's chronic conditions and comorbid symptoms for stability, deterioration, or improvement   Collaborate with multidisciplinary team to address chronic and comorbid conditions and prevent exacerbation or deterioration     Problem: ABCDS Injury
conditions and prevent exacerbation or deterioration  Taken 9/21/2023 1200 by Edward Garcia [facility] - Patient's Chronic Conditions and Co-Morbidity Symptoms are Monitored and Maintained or Improved: Monitor and assess patient's chronic conditions and comorbid symptoms for stability, deterioration, or improvement

## 2023-09-23 NOTE — CODE DOCUMENTATION
1607- blood sugar 70. Patient given OJ x2 to drink. Baseline dementia. Dr. Kathy Padilla at bedside. Ct head ordered Stat, along with EKG, orthostatic VS and he will review echo.      1610- Rapid Ended

## 2023-09-23 NOTE — SIGNIFICANT EVENT
Rapid response was called for transient episode of syncopal episode and unresponsiveness. We will get a CT head stat. Spoke with nursing Medicare EKG, continue with telemetry. Patient now back to baseline. Sugar was 70 we will give some juice and sugar was 90. We will continue with IV fluid. Orthostats once patient is more awake.   Spoke with nursing about the care  HEENT: AT/NC, PERRLA, no JVD  HEART: s1/s2 wnl w/o s3  LUNG: clear  ABD: soft, NT  EXT: no edema  SKin : no rash  Neuro:no gross motor deficits

## 2023-09-24 LAB
ANION GAP SERPL CALCULATED.3IONS-SCNC: 14 MEQ/L (ref 9–15)
BACTERIA URNS QL MICRO: ABNORMAL /HPF
BASOPHILS # BLD: 0 K/UL (ref 0–0.2)
BASOPHILS NFR BLD: 0.1 %
BILIRUB UR QL STRIP: NEGATIVE
BUN SERPL-MCNC: 32 MG/DL (ref 8–23)
CALCIUM SERPL-MCNC: 8.8 MG/DL (ref 8.5–9.9)
CHLORIDE SERPL-SCNC: 94 MEQ/L (ref 95–107)
CLARITY UR: ABNORMAL
CO2 SERPL-SCNC: 19 MEQ/L (ref 20–31)
COLOR UR: YELLOW
CREAT SERPL-MCNC: 2.4 MG/DL (ref 0.5–0.9)
EOSINOPHIL # BLD: 0.1 K/UL (ref 0–0.7)
EOSINOPHIL NFR BLD: 0.9 %
EPI CELLS #/AREA URNS HPF: ABNORMAL /HPF
ERYTHROCYTE [DISTWIDTH] IN BLOOD BY AUTOMATED COUNT: 12.5 % (ref 11.5–14.5)
GLUCOSE BLD-MCNC: 184 MG/DL (ref 70–99)
GLUCOSE BLD-MCNC: 201 MG/DL (ref 70–99)
GLUCOSE BLD-MCNC: 244 MG/DL (ref 70–99)
GLUCOSE BLD-MCNC: 274 MG/DL (ref 70–99)
GLUCOSE SERPL-MCNC: 165 MG/DL (ref 70–99)
GLUCOSE UR STRIP-MCNC: NEGATIVE MG/DL
HCT VFR BLD AUTO: 34 % (ref 37–47)
HGB BLD-MCNC: 11.5 G/DL (ref 12–16)
HGB UR QL STRIP: ABNORMAL
KETONES UR STRIP-MCNC: NEGATIVE MG/DL
LEUKOCYTE ESTERASE UR QL STRIP: ABNORMAL
LYMPHOCYTES # BLD: 1 K/UL (ref 1–4.8)
LYMPHOCYTES NFR BLD: 12.3 %
MCH RBC QN AUTO: 29.8 PG (ref 27–31.3)
MCHC RBC AUTO-ENTMCNC: 33.8 % (ref 33–37)
MCV RBC AUTO: 88.1 FL (ref 79.4–94.8)
MONOCYTES # BLD: 0.8 K/UL (ref 0.2–0.8)
MONOCYTES NFR BLD: 9.4 %
MUCOUS THREADS URNS QL MICRO: PRESENT /LPF
NEUTROPHILS # BLD: 6.5 K/UL (ref 1.4–6.5)
NEUTS SEG NFR BLD: 76.8 %
NITRITE UR QL STRIP: NEGATIVE
PERFORMED ON: ABNORMAL
PH UR STRIP: 5.5 [PH] (ref 5–9)
PLATELET # BLD AUTO: 194 K/UL (ref 130–400)
POTASSIUM SERPL-SCNC: 4.9 MEQ/L (ref 3.4–4.9)
PROT UR STRIP-MCNC: >=300 MG/DL
RBC # BLD AUTO: 3.86 M/UL (ref 4.2–5.4)
RBC #/AREA URNS HPF: ABNORMAL /HPF (ref 0–2)
RENAL EPI CELLS #/AREA URNS HPF: ABNORMAL /HPF
SODIUM SERPL-SCNC: 127 MEQ/L (ref 135–144)
SP GR UR STRIP: 1.01 (ref 1–1.03)
URINE REFLEX TO CULTURE: YES
UROBILINOGEN UR STRIP-ACNC: 0.2 E.U./DL
WBC # BLD AUTO: 8.4 K/UL (ref 4.8–10.8)
WBC #/AREA URNS HPF: >100 /HPF (ref 0–5)

## 2023-09-24 PROCEDURE — 87077 CULTURE AEROBIC IDENTIFY: CPT

## 2023-09-24 PROCEDURE — 1210000000 HC MED SURG R&B

## 2023-09-24 PROCEDURE — 81001 URINALYSIS AUTO W/SCOPE: CPT

## 2023-09-24 PROCEDURE — 87086 URINE CULTURE/COLONY COUNT: CPT

## 2023-09-24 PROCEDURE — 6370000000 HC RX 637 (ALT 250 FOR IP): Performed by: NURSE PRACTITIONER

## 2023-09-24 PROCEDURE — C9113 INJ PANTOPRAZOLE SODIUM, VIA: HCPCS | Performed by: INTERNAL MEDICINE

## 2023-09-24 PROCEDURE — 6370000000 HC RX 637 (ALT 250 FOR IP): Performed by: INTERNAL MEDICINE

## 2023-09-24 PROCEDURE — 85025 COMPLETE CBC W/AUTO DIFF WBC: CPT

## 2023-09-24 PROCEDURE — 6360000002 HC RX W HCPCS: Performed by: INTERNAL MEDICINE

## 2023-09-24 PROCEDURE — 2580000003 HC RX 258: Performed by: INTERNAL MEDICINE

## 2023-09-24 PROCEDURE — 80048 BASIC METABOLIC PNL TOTAL CA: CPT

## 2023-09-24 PROCEDURE — 99232 SBSQ HOSP IP/OBS MODERATE 35: CPT | Performed by: PSYCHIATRY & NEUROLOGY

## 2023-09-24 PROCEDURE — 87186 SC STD MICRODIL/AGAR DIL: CPT

## 2023-09-24 PROCEDURE — 36415 COLL VENOUS BLD VENIPUNCTURE: CPT

## 2023-09-24 PROCEDURE — 2580000003 HC RX 258

## 2023-09-24 RX ORDER — SODIUM CHLORIDE 9 MG/ML
INJECTION, SOLUTION INTRAVENOUS CONTINUOUS
Status: DISCONTINUED | OUTPATIENT
Start: 2023-09-24 | End: 2023-09-26

## 2023-09-24 RX ORDER — WATER 1000 ML/1000ML
INJECTION, SOLUTION INTRAVENOUS
Status: DISPENSED
Start: 2023-09-24 | End: 2023-09-24

## 2023-09-24 RX ADMIN — HYDRALAZINE HYDROCHLORIDE 25 MG: 25 TABLET, FILM COATED ORAL at 20:30

## 2023-09-24 RX ADMIN — OXYBUTYNIN CHLORIDE 10 MG: 5 TABLET, EXTENDED RELEASE ORAL at 17:51

## 2023-09-24 RX ADMIN — AMLODIPINE BESYLATE 10 MG: 10 TABLET ORAL at 11:38

## 2023-09-24 RX ADMIN — ASPIRIN 81 MG 81 MG: 81 TABLET ORAL at 11:38

## 2023-09-24 RX ADMIN — INSULIN LISPRO 2 UNITS: 100 INJECTION, SOLUTION INTRAVENOUS; SUBCUTANEOUS at 17:51

## 2023-09-24 RX ADMIN — TAMSULOSIN HYDROCHLORIDE 0.4 MG: 0.4 CAPSULE ORAL at 17:51

## 2023-09-24 RX ADMIN — ROSUVASTATIN CALCIUM 10 MG: 10 TABLET, FILM COATED ORAL at 20:30

## 2023-09-24 RX ADMIN — SODIUM CHLORIDE: 9 INJECTION, SOLUTION INTRAVENOUS at 11:47

## 2023-09-24 RX ADMIN — DULOXETINE HYDROCHLORIDE 20 MG: 20 CAPSULE, DELAYED RELEASE ORAL at 11:39

## 2023-09-24 RX ADMIN — CEFTRIAXONE SODIUM 1000 MG: 1 INJECTION, POWDER, FOR SOLUTION INTRAMUSCULAR; INTRAVENOUS at 15:23

## 2023-09-24 RX ADMIN — Medication 10 ML: at 09:27

## 2023-09-24 RX ADMIN — HYDRALAZINE HYDROCHLORIDE 25 MG: 25 TABLET, FILM COATED ORAL at 14:53

## 2023-09-24 RX ADMIN — HYDRALAZINE HYDROCHLORIDE 25 MG: 25 TABLET, FILM COATED ORAL at 06:22

## 2023-09-25 ENCOUNTER — APPOINTMENT (OUTPATIENT)
Age: 88
DRG: 078 | End: 2023-09-25
Attending: INTERNAL MEDICINE
Payer: MEDICARE

## 2023-09-25 PROBLEM — N31.9 NEUROGENIC BLADDER: Status: ACTIVE | Noted: 2023-09-25

## 2023-09-25 LAB
ANION GAP SERPL CALCULATED.3IONS-SCNC: 12 MEQ/L (ref 9–15)
BASOPHILS # BLD: 0 K/UL (ref 0–0.2)
BASOPHILS NFR BLD: 0.4 %
BUN SERPL-MCNC: 28 MG/DL (ref 8–23)
CALCIUM SERPL-MCNC: 8.7 MG/DL (ref 8.5–9.9)
CHLORIDE SERPL-SCNC: 99 MEQ/L (ref 95–107)
CO2 SERPL-SCNC: 20 MEQ/L (ref 20–31)
CREAT SERPL-MCNC: 2.2 MG/DL (ref 0.5–0.9)
ECHO AV AREA PEAK VELOCITY: 2.1 CM2
ECHO AV AREA VTI: 2.1 CM2
ECHO AV AREA/BSA PEAK VELOCITY: 1.2 CM2/M2
ECHO AV AREA/BSA VTI: 1.2 CM2/M2
ECHO AV CUSP MM: 1.7 CM
ECHO AV MEAN GRADIENT: 14 MMHG
ECHO AV MEAN VELOCITY: 1.8 M/S
ECHO AV PEAK GRADIENT: 21 MMHG
ECHO AV PEAK VELOCITY: 2.6 M/S
ECHO AV VELOCITY RATIO: 0.88
ECHO AV VTI: 61.3 CM
ECHO BSA: 1.7 M2
ECHO EST RA PRESSURE: 3 MMHG
ECHO LA VOL 4C: 35 ML (ref 22–52)
ECHO LA VOL 4C: 39 ML (ref 22–52)
ECHO LV E' LATERAL VELOCITY: 9 CM/S
ECHO LV E' SEPTAL VELOCITY: 5 CM/S
ECHO LV EDV A4C: 46 ML
ECHO LV EDV INDEX A4C: 27 ML/M2
ECHO LV EJECTION FRACTION A4C: 54 %
ECHO LV ESV A4C: 22 ML
ECHO LV ESV INDEX A4C: 13 ML/M2
ECHO LV FRACTIONAL SHORTENING: 32 % (ref 28–44)
ECHO LV INTERNAL DIMENSION DIASTOLE INDEX: 2.6 CM/M2
ECHO LV INTERNAL DIMENSION DIASTOLIC: 4.4 CM (ref 3.9–5.3)
ECHO LV INTERNAL DIMENSION SYSTOLIC INDEX: 1.78 CM/M2
ECHO LV INTERNAL DIMENSION SYSTOLIC: 3 CM
ECHO LV IVSD: 1.2 CM (ref 0.6–0.9)
ECHO LV IVSS: 1.7 CM
ECHO LV MASS 2D: 191.3 G (ref 67–162)
ECHO LV MASS INDEX 2D: 113.2 G/M2 (ref 43–95)
ECHO LV POSTERIOR WALL DIASTOLIC: 1.2 CM (ref 0.6–0.9)
ECHO LV POSTERIOR WALL SYSTOLIC: 1.2 CM
ECHO LV RELATIVE WALL THICKNESS RATIO: 0.55
ECHO LVOT AREA: 2.3 CM2
ECHO LVOT AV VTI INDEX: 0.93
ECHO LVOT DIAM: 1.7 CM
ECHO LVOT MEAN GRADIENT: 9 MMHG
ECHO LVOT PEAK GRADIENT: 14 MMHG
ECHO LVOT PEAK VELOCITY: 2.3 M/S
ECHO LVOT STROKE VOLUME INDEX: 76.2 ML/M2
ECHO LVOT SV: 128.9 ML
ECHO LVOT VTI: 56.8 CM
ECHO MV A VELOCITY: 1.27 M/S
ECHO MV E DECELERATION TIME (DT): 238.8 MS
ECHO MV E VELOCITY: 1.05 M/S
ECHO MV E/A RATIO: 0.83
ECHO MV E/E' LATERAL: 11.67
ECHO MV E/E' RATIO (AVERAGED): 16.33
ECHO MV E/E' SEPTAL: 21
ECHO RIGHT VENTRICULAR SYSTOLIC PRESSURE (RVSP): 42 MMHG
ECHO TV REGURGITANT MAX VELOCITY: 3.11 M/S
ECHO TV REGURGITANT PEAK GRADIENT: 39 MMHG
EKG ATRIAL RATE: 94 BPM
EKG P-R INTERVAL: 152 MS
EKG Q-T INTERVAL: 344 MS
EKG QRS DURATION: 76 MS
EKG QTC CALCULATION (BAZETT): 413 MS
EKG R AXIS: 5 DEGREES
EKG T AXIS: 117 DEGREES
EKG VENTRICULAR RATE: 87 BPM
EOSINOPHIL # BLD: 0.2 K/UL (ref 0–0.7)
EOSINOPHIL NFR BLD: 2.8 %
ERYTHROCYTE [DISTWIDTH] IN BLOOD BY AUTOMATED COUNT: 12.5 % (ref 11.5–14.5)
GLUCOSE BLD-MCNC: 253 MG/DL (ref 70–99)
GLUCOSE BLD-MCNC: 291 MG/DL (ref 70–99)
GLUCOSE BLD-MCNC: 322 MG/DL (ref 70–99)
GLUCOSE BLD-MCNC: 89 MG/DL (ref 70–99)
GLUCOSE SERPL-MCNC: 211 MG/DL (ref 70–99)
HCT VFR BLD AUTO: 32.2 % (ref 37–47)
HGB BLD-MCNC: 10.6 G/DL (ref 12–16)
LYMPHOCYTES # BLD: 1.4 K/UL (ref 1–4.8)
LYMPHOCYTES NFR BLD: 18.5 %
MCH RBC QN AUTO: 29 PG (ref 27–31.3)
MCHC RBC AUTO-ENTMCNC: 32.9 % (ref 33–37)
MCV RBC AUTO: 88.2 FL (ref 79.4–94.8)
MONOCYTES # BLD: 0.9 K/UL (ref 0.2–0.8)
MONOCYTES NFR BLD: 12.5 %
NEUTROPHILS # BLD: 4.9 K/UL (ref 1.4–6.5)
NEUTS SEG NFR BLD: 65.4 %
PERFORMED ON: ABNORMAL
PERFORMED ON: NORMAL
PLATELET # BLD AUTO: 188 K/UL (ref 130–400)
POTASSIUM SERPL-SCNC: 4.2 MEQ/L (ref 3.4–4.9)
RBC # BLD AUTO: 3.65 M/UL (ref 4.2–5.4)
SODIUM SERPL-SCNC: 131 MEQ/L (ref 135–144)
WBC # BLD AUTO: 7.4 K/UL (ref 4.8–10.8)

## 2023-09-25 PROCEDURE — 93010 ELECTROCARDIOGRAM REPORT: CPT | Performed by: INTERNAL MEDICINE

## 2023-09-25 PROCEDURE — 6360000002 HC RX W HCPCS: Performed by: INTERNAL MEDICINE

## 2023-09-25 PROCEDURE — 36415 COLL VENOUS BLD VENIPUNCTURE: CPT

## 2023-09-25 PROCEDURE — 2580000003 HC RX 258

## 2023-09-25 PROCEDURE — C9113 INJ PANTOPRAZOLE SODIUM, VIA: HCPCS | Performed by: INTERNAL MEDICINE

## 2023-09-25 PROCEDURE — 6370000000 HC RX 637 (ALT 250 FOR IP): Performed by: INTERNAL MEDICINE

## 2023-09-25 PROCEDURE — 51798 US URINE CAPACITY MEASURE: CPT

## 2023-09-25 PROCEDURE — 93306 TTE W/DOPPLER COMPLETE: CPT | Performed by: INTERNAL MEDICINE

## 2023-09-25 PROCEDURE — 97535 SELF CARE MNGMENT TRAINING: CPT

## 2023-09-25 PROCEDURE — 92526 ORAL FUNCTION THERAPY: CPT

## 2023-09-25 PROCEDURE — 2580000003 HC RX 258: Performed by: INTERNAL MEDICINE

## 2023-09-25 PROCEDURE — 85025 COMPLETE CBC W/AUTO DIFF WBC: CPT

## 2023-09-25 PROCEDURE — 80048 BASIC METABOLIC PNL TOTAL CA: CPT

## 2023-09-25 PROCEDURE — 93306 TTE W/DOPPLER COMPLETE: CPT

## 2023-09-25 PROCEDURE — 99232 SBSQ HOSP IP/OBS MODERATE 35: CPT | Performed by: PHYSICAL MEDICINE & REHABILITATION

## 2023-09-25 PROCEDURE — 97116 GAIT TRAINING THERAPY: CPT

## 2023-09-25 PROCEDURE — 6370000000 HC RX 637 (ALT 250 FOR IP): Performed by: NURSE PRACTITIONER

## 2023-09-25 PROCEDURE — 97129 THER IVNTJ 1ST 15 MIN: CPT

## 2023-09-25 PROCEDURE — 1210000000 HC MED SURG R&B

## 2023-09-25 RX ORDER — WATER 1000 ML/1000ML
INJECTION, SOLUTION INTRAVENOUS
Status: COMPLETED
Start: 2023-09-25 | End: 2023-09-25

## 2023-09-25 RX ADMIN — Medication 10 ML: at 21:29

## 2023-09-25 RX ADMIN — AMLODIPINE BESYLATE 10 MG: 10 TABLET ORAL at 08:04

## 2023-09-25 RX ADMIN — INSULIN LISPRO 6 UNITS: 100 INJECTION, SOLUTION INTRAVENOUS; SUBCUTANEOUS at 18:38

## 2023-09-25 RX ADMIN — DULOXETINE HYDROCHLORIDE 20 MG: 20 CAPSULE, DELAYED RELEASE ORAL at 08:04

## 2023-09-25 RX ADMIN — TAMSULOSIN HYDROCHLORIDE 0.4 MG: 0.4 CAPSULE ORAL at 18:33

## 2023-09-25 RX ADMIN — HYDRALAZINE HYDROCHLORIDE 25 MG: 25 TABLET, FILM COATED ORAL at 14:19

## 2023-09-25 RX ADMIN — PANTOPRAZOLE SODIUM 40 MG: 40 INJECTION, POWDER, FOR SOLUTION INTRAVENOUS at 08:04

## 2023-09-25 RX ADMIN — WATER 10 ML: 1 INJECTION INTRAMUSCULAR; INTRAVENOUS; SUBCUTANEOUS at 08:04

## 2023-09-25 RX ADMIN — ENOXAPARIN SODIUM 30 MG: 100 INJECTION SUBCUTANEOUS at 08:03

## 2023-09-25 RX ADMIN — ROSUVASTATIN CALCIUM 10 MG: 10 TABLET, FILM COATED ORAL at 21:29

## 2023-09-25 RX ADMIN — OXYBUTYNIN CHLORIDE 10 MG: 5 TABLET, EXTENDED RELEASE ORAL at 18:34

## 2023-09-25 RX ADMIN — ASPIRIN 81 MG 81 MG: 81 TABLET ORAL at 08:04

## 2023-09-25 RX ADMIN — HYDRALAZINE HYDROCHLORIDE 25 MG: 25 TABLET, FILM COATED ORAL at 21:29

## 2023-09-25 RX ADMIN — CEFTRIAXONE SODIUM 1000 MG: 1 INJECTION, POWDER, FOR SOLUTION INTRAMUSCULAR; INTRAVENOUS at 14:19

## 2023-09-25 RX ADMIN — Medication 10 ML: at 11:11

## 2023-09-25 RX ADMIN — SODIUM CHLORIDE: 9 INJECTION, SOLUTION INTRAVENOUS at 17:49

## 2023-09-25 ASSESSMENT — PAIN SCALES - WONG BAKER
WONGBAKER_NUMERICALRESPONSE: 0
WONGBAKER_NUMERICALRESPONSE: 0

## 2023-09-25 ASSESSMENT — PAIN SCALES - GENERAL
PAINLEVEL_OUTOF10: 0
PAINLEVEL_OUTOF10: 0

## 2023-09-25 NOTE — FLOWSHEET NOTE
Pt alert ti self only confusion continues. 1:1 continues for safety. Pt impulsive pulling at peripheral IV easily redirected. Refused am meal after several attempts made to assist with feeding. No s/s of pain or discomfort. Electronically signed by Lobo Begum RN on 9/25/2023 at 11:23 AM

## 2023-09-26 PROBLEM — N17.9 AKI (ACUTE KIDNEY INJURY) (HCC): Status: ACTIVE | Noted: 2023-09-26

## 2023-09-26 PROBLEM — Z91.81 HISTORY OF RECENT FALL: Status: ACTIVE | Noted: 2023-09-26

## 2023-09-26 PROBLEM — I49.9 CARDIAC ARRHYTHMIA: Status: ACTIVE | Noted: 2023-09-26

## 2023-09-26 LAB
ANION GAP SERPL CALCULATED.3IONS-SCNC: 13 MEQ/L (ref 9–15)
BACTERIA UR CULT: ABNORMAL
BASOPHILS # BLD: 0 K/UL (ref 0–0.2)
BASOPHILS NFR BLD: 0.4 %
BUN SERPL-MCNC: 23 MG/DL (ref 8–23)
CALCIUM SERPL-MCNC: 8.5 MG/DL (ref 8.5–9.9)
CHLORIDE SERPL-SCNC: 104 MEQ/L (ref 95–107)
CO2 SERPL-SCNC: 19 MEQ/L (ref 20–31)
CREAT SERPL-MCNC: 2.12 MG/DL (ref 0.5–0.9)
EOSINOPHIL # BLD: 0.2 K/UL (ref 0–0.7)
EOSINOPHIL NFR BLD: 2.6 %
ERYTHROCYTE [DISTWIDTH] IN BLOOD BY AUTOMATED COUNT: 12.9 % (ref 11.5–14.5)
GLUCOSE BLD-MCNC: 117 MG/DL (ref 70–99)
GLUCOSE BLD-MCNC: 141 MG/DL (ref 70–99)
GLUCOSE BLD-MCNC: 282 MG/DL (ref 70–99)
GLUCOSE SERPL-MCNC: 150 MG/DL (ref 70–99)
HCT VFR BLD AUTO: 29.3 % (ref 37–47)
HGB BLD-MCNC: 9.6 G/DL (ref 12–16)
LYMPHOCYTES # BLD: 1.2 K/UL (ref 1–4.8)
LYMPHOCYTES NFR BLD: 14.8 %
MCH RBC QN AUTO: 29.3 PG (ref 27–31.3)
MCHC RBC AUTO-ENTMCNC: 32.8 % (ref 33–37)
MCV RBC AUTO: 89.3 FL (ref 79.4–94.8)
MONOCYTES # BLD: 0.9 K/UL (ref 0.2–0.8)
MONOCYTES NFR BLD: 10.6 %
NEUTROPHILS # BLD: 5.7 K/UL (ref 1.4–6.5)
NEUTS SEG NFR BLD: 71.2 %
ORGANISM: ABNORMAL
ORGANISM: ABNORMAL
PERFORMED ON: ABNORMAL
PLATELET # BLD AUTO: 182 K/UL (ref 130–400)
POTASSIUM SERPL-SCNC: 3.9 MEQ/L (ref 3.4–4.9)
RBC # BLD AUTO: 3.28 M/UL (ref 4.2–5.4)
SODIUM SERPL-SCNC: 136 MEQ/L (ref 135–144)
WBC # BLD AUTO: 8 K/UL (ref 4.8–10.8)

## 2023-09-26 PROCEDURE — 85025 COMPLETE CBC W/AUTO DIFF WBC: CPT

## 2023-09-26 PROCEDURE — APPSS45 APP SPLIT SHARED TIME 31-45 MINUTES: Performed by: PHYSICIAN ASSISTANT

## 2023-09-26 PROCEDURE — 6360000002 HC RX W HCPCS: Performed by: INTERNAL MEDICINE

## 2023-09-26 PROCEDURE — 6370000000 HC RX 637 (ALT 250 FOR IP): Performed by: INTERNAL MEDICINE

## 2023-09-26 PROCEDURE — 97535 SELF CARE MNGMENT TRAINING: CPT

## 2023-09-26 PROCEDURE — 99232 SBSQ HOSP IP/OBS MODERATE 35: CPT | Performed by: PHYSICAL MEDICINE & REHABILITATION

## 2023-09-26 PROCEDURE — C9113 INJ PANTOPRAZOLE SODIUM, VIA: HCPCS | Performed by: INTERNAL MEDICINE

## 2023-09-26 PROCEDURE — 2580000003 HC RX 258: Performed by: INTERNAL MEDICINE

## 2023-09-26 PROCEDURE — 36415 COLL VENOUS BLD VENIPUNCTURE: CPT

## 2023-09-26 PROCEDURE — 80048 BASIC METABOLIC PNL TOTAL CA: CPT

## 2023-09-26 PROCEDURE — 2500000003 HC RX 250 WO HCPCS: Performed by: PHYSICIAN ASSISTANT

## 2023-09-26 PROCEDURE — 1210000000 HC MED SURG R&B

## 2023-09-26 PROCEDURE — 99223 1ST HOSP IP/OBS HIGH 75: CPT | Performed by: INTERNAL MEDICINE

## 2023-09-26 RX ORDER — METOPROLOL TARTRATE 5 MG/5ML
2.5 INJECTION INTRAVENOUS EVERY 6 HOURS
Status: DISCONTINUED | OUTPATIENT
Start: 2023-09-26 | End: 2023-09-27

## 2023-09-26 RX ADMIN — Medication 10 ML: at 09:16

## 2023-09-26 RX ADMIN — SODIUM CHLORIDE: 9 INJECTION, SOLUTION INTRAVENOUS at 04:01

## 2023-09-26 RX ADMIN — METOPROLOL TARTRATE 2.5 MG: 5 INJECTION, SOLUTION INTRAVENOUS at 17:47

## 2023-09-26 RX ADMIN — AMLODIPINE BESYLATE 10 MG: 10 TABLET ORAL at 09:16

## 2023-09-26 RX ADMIN — ASPIRIN 81 MG 81 MG: 81 TABLET ORAL at 09:16

## 2023-09-26 RX ADMIN — DULOXETINE HYDROCHLORIDE 20 MG: 20 CAPSULE, DELAYED RELEASE ORAL at 09:16

## 2023-09-26 RX ADMIN — PANTOPRAZOLE SODIUM 40 MG: 40 INJECTION, POWDER, FOR SOLUTION INTRAVENOUS at 10:25

## 2023-09-26 RX ADMIN — METOPROLOL TARTRATE 2.5 MG: 5 INJECTION, SOLUTION INTRAVENOUS at 11:28

## 2023-09-26 RX ADMIN — HYDRALAZINE HYDROCHLORIDE 25 MG: 25 TABLET, FILM COATED ORAL at 06:03

## 2023-09-26 RX ADMIN — INSULIN LISPRO 4 UNITS: 100 INJECTION, SOLUTION INTRAVENOUS; SUBCUTANEOUS at 13:07

## 2023-09-26 RX ADMIN — ENOXAPARIN SODIUM 30 MG: 100 INJECTION SUBCUTANEOUS at 09:16

## 2023-09-26 RX ADMIN — AMPICILLIN SODIUM 2000 MG: 2 INJECTION, POWDER, FOR SOLUTION INTRAMUSCULAR; INTRAVENOUS at 13:08

## 2023-09-26 ASSESSMENT — ENCOUNTER SYMPTOMS: PHOTOPHOBIA: 0

## 2023-09-26 NOTE — FLOWSHEET NOTE
Resume care of pt at this time. Pt resting in her bed with both eyes closed was told in report that she was awake most on night. Mcintosh draining cloudy yellow urine. . Will continue to monitor. Electronically signed by Miesha Almeida LPN on 3/37/9439 at 3:65 AMPt came to room to do assessment and we assisted to putting her up in chair. Pt very hard to wake up but was able to get her to siton side of bed with help and holding her. Rajesh Kan in front of her placed her hands on wlkerand assisted in helping her stand but the right hand did not  the walker and just fell off and right leg kind turned under her until we placed they on floor flat. Holding her we assisted her to the chair but just sat down. Once in chair assisted her with breakfast. She got biscuits and sausage and gravy gave her one spoon full and she chewed it for 5 min so the next spoon full I chopped it up in smaller pieces and she chewed it also  and when she went to swallow it she looked like she was going to vomit it up but didn't. Pt took very little to drink maybe 10cc at the most. And 5% of breakfast.  Reva into see took down the fluids and ordered a speech eval, then Dr. Adilene Meeks came in and made her NPO and then the 's PA came in and is going to review the case. Pt will be monitored by the Sebastien since 9:00 and sitter will be out off the room until 10:00 for trial.. es

## 2023-09-26 NOTE — FLOWSHEET NOTE
Pt. A&O x1, can state name but not birthday. VVS, pt. can follow some commands, it varies time from time what commands she will follow. Respirations are even and unlabored. Mcintosh is draining yellow, clear urine at this time. Pt. took pills whole in applesauce with no problems. Pt. has been restless on and off all night and has not slept. Will continue with plan of care. Electronically signed by Domenic Colbert RN on 9/26/2023 at 4:53 AM

## 2023-09-26 NOTE — CONSULTS
Consult Note  Patient: Zack Delgado  Unit/Bed: C517/X951-68  YOB: 1933  MRN: [id number]  Acct: [de-identified]   Admitting Diagnosis: Acute CVA (cerebrovascular accident) [facility]) [I63.9]  Altered mental status, unspecified altered mental status type [R41.82]  Date:  9/19/2023  Hospital Day: 7      Chief Complaint:  Found down at home    History of Present Illness: This is a 44-year-old  female with past medical history significant for hypertension, dyslipidemia, diabetes, CKD and anemia who presented to [facility] ER on 9/19/2023 after being found down at home prior to presentation. Apparently family had talked to patient earlier in the morning on day of presentation and she was subsequently found down on the ground by home health care later that morning. Was subsequently brought to ER for further evaluation. On presentation emergency room, patient sent hypertensive with blood pressure 229/92, heart rate 78, respiratory rate 18, pulse ox 98%. Initial labs showing sodium 136, potassium 4.9, chloride 99, total CO2 25, BUN elevated 28, creatinine elevated 1.92, GFR low at 24.4, glucose elevated 152. proBNP 791. Troponin negative less than 0.010. TSH normal at 2.70. WBC 8.3, hemoglobin 11.8, hematocrit 36.7, platelets 787. Urinalysis unremarkable. CT brain revealed no acute intracranial abnormality. CT cervical, thoracic and lumbar spine without acute fracture. CT chest with no acute intrathoracic changes. CT abdomen showed diverticulosis of sigmoid and distal descending colon, constipation. She was initially placed on nicardipine drip and admitted for further evaluation. She has been followed by neurology during admission. MRI of the brain on 9/20/2023 showed no gross abnormality, no sign of acute infarction. Nicardipine drip was eventually weaned off and patient transferred from ICU.   Being monitored on telemetry, patient was noted on 9/25/2023 to have approximately 2 minutes of
Critical Care Consult      Admit Date: 9/19/2023    PCP:  Etelvina Francis MD         CHIEF COMPLAINT / HPI:                The patient is a 80 y.o. female with significant past medical history of hyperlipidemia, hypertension, chronic kidney disease and diabetes who presented with mental status changes. Apparently, she was found down by EMS. She was evaluated initially by trauma service due to possible fall. CT head was negative. Neurology has been contacted and recommended starting Cardene gtt. Past Medical History:      Diagnosis Date    Back pain 07/17/2009    Balance problems 05/16/2020    Constipation 08/03/2022    Dysphagia 08/03/2022    Falls 08/03/2022    Hyperlipidemia     Hypertension     Incomplete bladder emptying 04/28/2009    Neuropathy     Piriformis syndrome 08/03/2022    Stage 3 chronic kidney disease ([address]) 08/2022    Type 2 diabetes mellitus without complication (HCC)     UTI (urinary tract infection) 08/30/2022    Varicose veins of both lower extremities 05/16/2020        Past Surgical History:    History reviewed. No pertinent surgical history. Current Medications:     sodium chloride flush  5-40 mL IntraVENous 2 times per day    enoxaparin  30 mg SubCUTAneous Daily    atorvastatin  80 mg Oral Nightly    aspirin  81 mg Oral Daily    Or    aspirin  300 mg Rectal Daily    pantoprazole  40 mg IntraVENous Daily     Home Meds:  Prior to Admission medications    Medication Sig Start Date End Date Taking?  Authorizing Provider   vitamin C (ASCORBIC ACID) 500 MG tablet Take 2 tablets by mouth every evening   Yes Historical Provider, MD   cephALEXin (KEFLEX) 500 MG capsule Take 1 capsule by mouth Twice a Week   Yes Historical Provider, MD   nitrofurantoin, macrocrystal-monohydrate, (MACROBID) 100 MG capsule Take 1 capsule by mouth Twice a Week On Wednesday and Sunday   Yes Historical Provider, MD   blood glucose test strips (EXACTECH TEST) strip  3/16/20   Historical Provider, MD
Subjective:      Patient ID: Hiral Muñoz is a 80 y.o. female who presents today for strokelike symptoms. Neetu Felix HPI 44-year-old right-handed female admitted with being found down at 11 AM in the morning. EMS reported that family spoke to her at 5:30 PM the night before. Patient seemed at her baseline at that time. In the morning at 10 AM daughter found her. She lives next to the daughter. In the emergency room she had mild slurred speech with a right facial droop and her NIH score was reported to be 3 when I was called. Patient was admitted overnight to the intensive care unit. She was not considered to be a TNK candidate as the onset time was not known to us. We had recommended strict blood pressure control and aspirin for now. Patient lives alone and has some minor memory issues but functions otherwise well according to the daughter. She was going to have home health care soon. Patient seen in the intensive care unit she is quite hard of hearing and follows commands through her daughter. Review of Systems   Constitutional:  Negative for fever. HENT:  Negative for ear pain, tinnitus and trouble swallowing. Eyes:  Negative for photophobia and visual disturbance. Respiratory:  Negative for choking and shortness of breath. Cardiovascular:  Negative for chest pain and palpitations. Gastrointestinal:  Negative for nausea and vomiting. Musculoskeletal:  Negative for back pain, gait problem, joint swelling, myalgias, neck pain and neck stiffness. Skin:  Negative for color change. Allergic/Immunologic: Negative for food allergies. Neurological:  Positive for weakness. Negative for dizziness, tremors, seizures, syncope, facial asymmetry, speech difficulty, light-headedness, numbness and headaches. Psychiatric/Behavioral:  Negative for behavioral problems, confusion, hallucinations and sleep disturbance.         Past Medical History:   Diagnosis Date    Back pain 07/17/2009    Balance
Trauma Consult / H & P Note    Reason for Consult: Trauma  Consulting Provider: Cassie Hanson MD      BASIC INJURY INFORMATION:  Level of activation: CAT 2  Mode of transport: EMS  Mechanism of injury: Fall from Standing  Complicating features: NA  Protective measures: NA    HISTORY OF PRESENT INJURY:   Petra Avila is a 80 y.o. female with a PMHx of constipation, dysphagia, DM2, PVD, HTN, GERD, HLD, CKD3, lumbar region spinal stenosis, congenital cystic kidney disease. Patient presents today s/p unwitnessed fall. (?)head strike. (?)LOC. (-)AC/AP meds per care everywhere. Per patient's family, she was talking on the phone today at 10:10am and was not making sense. [employer] Wheels went to home ~11am today and found the patient on the floor. EMS reports patient to be hypertensive with SBP in 200s. Patient with minimal/confused speech. Right sided facial droop. Last known normal was last night at 5:30pm when family spoke to patient on the phone. Patient is a poor historian and is unable to provide additional information. PRIMARY SURVEY:  Airway: Intact  Breathing: Normal   Breath Sounds: Breath Sounds Equal Bilaterally  Circulation:    Pulses: Normal   Skin: Normal skin color, texture and turgor  Disability:   Pupils: PERRL   GCS:    Best Eyes: 4    Best Verbal: 3    Best Motor: 6 (following 1 step, simple commands)    Total: 13    Vitals:   Vitals:    09/19/23 1415 09/19/23 1430 09/19/23 1459 09/19/23 1500   BP: (!) 228/72 (!) 226/64  (!) 235/87   Pulse: 70 70  70   Resp: 21 15  13   Temp:       TempSrc:       SpO2: 99% 100%  99%   Weight:   153 lb 14.4 oz (69.8 kg)    Height:             SECONDARY SURVEY:  Neurologic: Alert. Unable to assess orientation. When asked for name, patient responds \"I won\", Confused, Moves all Extremities, minimal weakness to RUE compaired to LUE.  Otherwise BLE with equal strength, and No Sensory Deficits   HEENT:   Head: No lacerations, bony step-offs, or abrasions
thoracic spine. CT ABDOMEN PELVIS WO CONTRAST Additional Contrast? None    Result Date: 9/19/2023  EXAMINATION: CT OF THE ABDOMEN AND PELVIS WITHOUT CONTRAST 9/19/2023 2:47 pm TECHNIQUE: CT of the abdomen and pelvis was performed without the administration of intravenous contrast. Multiplanar reformatted images are provided for review. Automated exposure control, iterative reconstruction, and/or weight based adjustment of the mA/kV was utilized to reduce the radiation dose to as low as reasonably achievable. COMPARISON: None. HISTORY: ORDERING SYSTEM PROVIDED HISTORY: Trauma TECHNOLOGIST PROVIDED HISTORY: Additional Contrast?->None Reason for exam:->Trauma Decision Support Exception - unselect if not a suspected or confirmed emergency medical condition->Emergency Medical Condition (MA) What reading provider will be dictating this exam?->CRC FINDINGS: Lower Chest: Bibasilar dependent subsegmental atelectatic change. Cardiac size normal.  No pericardial effusion. Coronary artery calcification identified. Small hiatal hernia. Organs: Liver normal in size, contour, and attenuation. No intrahepatic and no extrahepatic ductal dilatation. Gallbladder contains no calculi, wall thickening, pericholecystic fluid. Spleen normal in size without masses or calcification. Fatty replacement of the pancreas without masses or abnormal calcification. Adrenal glands without anomaly. Right kidney shows 3 cm cyst anterior lower pole. Left kidney shows parapelvic cystic change measuring approximately 5.4 x 4.2 cm, in the mid to upper pole centrally and anteriorly. No pelvocaliectasis and no calculi bilaterally. GI/Bowel: Normal in contour. Diverticular change, sigmoid colon and to lesser degree distal descending colon. Copious stool in colon. Appendix not visualized. Pelvis: Urinary bladder is smoothly marginated without wall thickening. No pelvic masses.  Peritoneum/Retroperitoneum: No free air, free fluid, abnormal
opinion that they will be able to tolerate and benefit from 3 hours of therapy a day. I reviewed the various options re: levels of care with the patient and family. Please see pre-admission screen note for further details. I discussed acute rehab with the patient and verify that the patient is able and willing to participate in 3 hours of therapy a day. Rehab and Acute Care Case Management has also reinforced this expectation. This patient requires multidisciplinary rehabilitation treatment, including daily care and management from a PM&R physician, 24-hour rehabilitation nursing, Physical Therapy, Occupational Therapy, rehabilitation psychology, consideration of speech and language pathology, recreational therapy, nutritional services, and a rehabilitation . I feel that it is reasonable to plan for a discharge to home setting after acute rehab. Specialized nursing care to focus on: Bowel and bladder issues-Monitor for urinary retention-check PVRs, bladder scan--cath if no void. Wound risk  -pressure relief protocols-side to side turns  IVF medication administration -NS    Monitor endurance and if necessary spread therapy out over a 7-day window-adding scheduled rest breaks when needed. Focus on energy conservation. Monitor heart rate and   cardiac medications effects on heart rate and blood pressure before, during and after therapy. Progress toward endurance training with pulse ox monitoring for saturation and heart rate. monitoring for dysphagia-- Improve hydration and nutrition by adding Vitamin B12 shot times one, adding Protein supplements and push PO fluids. Treat and monitor for higher level cognitive deficits, focus on difficulty with sequencing and problem-solving. Focus on higher-level balance and falls risk issues focusing on balance training and monitoring for orthostasis.       Above recommendations are indicated to address medical complexity and need for

## 2023-09-27 VITALS
HEIGHT: 65 IN | OXYGEN SATURATION: 95 % | HEART RATE: 79 BPM | SYSTOLIC BLOOD PRESSURE: 113 MMHG | BODY MASS INDEX: 23.16 KG/M2 | RESPIRATION RATE: 18 BRPM | DIASTOLIC BLOOD PRESSURE: 81 MMHG | TEMPERATURE: 98.2 F | WEIGHT: 139 LBS

## 2023-09-27 LAB
ANION GAP SERPL CALCULATED.3IONS-SCNC: 12 MEQ/L (ref 9–15)
BASOPHILS # BLD: 0 K/UL (ref 0–0.2)
BASOPHILS NFR BLD: 0.4 %
BUN SERPL-MCNC: 20 MG/DL (ref 8–23)
CALCIUM SERPL-MCNC: 8.7 MG/DL (ref 8.5–9.9)
CHLORIDE SERPL-SCNC: 105 MEQ/L (ref 95–107)
CO2 SERPL-SCNC: 19 MEQ/L (ref 20–31)
CREAT SERPL-MCNC: 1.99 MG/DL (ref 0.5–0.9)
EOSINOPHIL # BLD: 0.3 K/UL (ref 0–0.7)
EOSINOPHIL NFR BLD: 3.1 %
ERYTHROCYTE [DISTWIDTH] IN BLOOD BY AUTOMATED COUNT: 13.2 % (ref 11.5–14.5)
GLUCOSE BLD-MCNC: 144 MG/DL (ref 70–99)
GLUCOSE BLD-MCNC: 377 MG/DL (ref 70–99)
GLUCOSE SERPL-MCNC: 157 MG/DL (ref 70–99)
HCT VFR BLD AUTO: 33.4 % (ref 37–47)
HGB BLD-MCNC: 10.9 G/DL (ref 12–16)
LYMPHOCYTES # BLD: 1.2 K/UL (ref 1–4.8)
LYMPHOCYTES NFR BLD: 12.8 %
MCH RBC QN AUTO: 29.1 PG (ref 27–31.3)
MCHC RBC AUTO-ENTMCNC: 32.6 % (ref 33–37)
MCV RBC AUTO: 89.3 FL (ref 79.4–94.8)
MONOCYTES # BLD: 0.7 K/UL (ref 0.2–0.8)
MONOCYTES NFR BLD: 7.7 %
NEUTROPHILS # BLD: 6.9 K/UL (ref 1.4–6.5)
NEUTS SEG NFR BLD: 75.4 %
PERFORMED ON: ABNORMAL
PERFORMED ON: ABNORMAL
PLATELET # BLD AUTO: 198 K/UL (ref 130–400)
POTASSIUM SERPL-SCNC: 4.2 MEQ/L (ref 3.4–4.9)
RBC # BLD AUTO: 3.74 M/UL (ref 4.2–5.4)
SODIUM SERPL-SCNC: 136 MEQ/L (ref 135–144)
WBC # BLD AUTO: 9.1 K/UL (ref 4.8–10.8)

## 2023-09-27 PROCEDURE — 6370000000 HC RX 637 (ALT 250 FOR IP): Performed by: NURSE PRACTITIONER

## 2023-09-27 PROCEDURE — 6360000002 HC RX W HCPCS: Performed by: INTERNAL MEDICINE

## 2023-09-27 PROCEDURE — 2500000003 HC RX 250 WO HCPCS: Performed by: PHYSICIAN ASSISTANT

## 2023-09-27 PROCEDURE — 2580000003 HC RX 258: Performed by: INTERNAL MEDICINE

## 2023-09-27 PROCEDURE — 99232 SBSQ HOSP IP/OBS MODERATE 35: CPT | Performed by: PHYSICAL MEDICINE & REHABILITATION

## 2023-09-27 PROCEDURE — 99232 SBSQ HOSP IP/OBS MODERATE 35: CPT | Performed by: INTERNAL MEDICINE

## 2023-09-27 PROCEDURE — 80048 BASIC METABOLIC PNL TOTAL CA: CPT

## 2023-09-27 PROCEDURE — 6370000000 HC RX 637 (ALT 250 FOR IP): Performed by: INTERNAL MEDICINE

## 2023-09-27 PROCEDURE — 92526 ORAL FUNCTION THERAPY: CPT

## 2023-09-27 PROCEDURE — 97535 SELF CARE MNGMENT TRAINING: CPT

## 2023-09-27 PROCEDURE — 36415 COLL VENOUS BLD VENIPUNCTURE: CPT

## 2023-09-27 PROCEDURE — 85025 COMPLETE CBC W/AUTO DIFF WBC: CPT

## 2023-09-27 PROCEDURE — C9113 INJ PANTOPRAZOLE SODIUM, VIA: HCPCS | Performed by: INTERNAL MEDICINE

## 2023-09-27 RX ORDER — LEVOFLOXACIN 5 MG/ML
500 INJECTION, SOLUTION INTRAVENOUS
Status: DISCONTINUED | OUTPATIENT
Start: 2023-09-27 | End: 2023-09-27 | Stop reason: HOSPADM

## 2023-09-27 RX ADMIN — OXYBUTYNIN CHLORIDE 10 MG: 5 TABLET, EXTENDED RELEASE ORAL at 17:39

## 2023-09-27 RX ADMIN — INSULIN LISPRO 6 UNITS: 100 INJECTION, SOLUTION INTRAVENOUS; SUBCUTANEOUS at 17:39

## 2023-09-27 RX ADMIN — PANTOPRAZOLE SODIUM 40 MG: 40 INJECTION, POWDER, FOR SOLUTION INTRAVENOUS at 09:47

## 2023-09-27 RX ADMIN — ENOXAPARIN SODIUM 30 MG: 100 INJECTION SUBCUTANEOUS at 09:48

## 2023-09-27 RX ADMIN — HYDRALAZINE HYDROCHLORIDE 25 MG: 25 TABLET, FILM COATED ORAL at 15:04

## 2023-09-27 RX ADMIN — METOPROLOL TARTRATE 2.5 MG: 5 INJECTION, SOLUTION INTRAVENOUS at 06:43

## 2023-09-27 RX ADMIN — LEVOFLOXACIN 500 MG: 5 INJECTION, SOLUTION INTRAVENOUS at 10:09

## 2023-09-27 RX ADMIN — METOPROLOL TARTRATE 2.5 MG: 5 INJECTION, SOLUTION INTRAVENOUS at 00:26

## 2023-09-27 RX ADMIN — AMPICILLIN SODIUM 2000 MG: 2 INJECTION, POWDER, FOR SOLUTION INTRAMUSCULAR; INTRAVENOUS at 01:51

## 2023-09-27 RX ADMIN — ASPIRIN 300 MG: 300 SUPPOSITORY RECTAL at 09:47

## 2023-09-27 RX ADMIN — METOPROLOL TARTRATE 2.5 MG: 5 INJECTION, SOLUTION INTRAVENOUS at 09:47

## 2023-09-27 RX ADMIN — Medication 10 ML: at 09:59

## 2023-09-27 RX ADMIN — TAMSULOSIN HYDROCHLORIDE 0.4 MG: 0.4 CAPSULE ORAL at 17:40

## 2023-09-27 RX ADMIN — Medication 10 ML: at 00:26

## 2023-09-27 NOTE — DISCHARGE INSTRUCTIONS
Follow up with primary care physician in the next 7 days or sooner if needed. If you do not have a Primary care physician, please schedule an appointment with one. Please ask prior to discharge about a list of local providers. Please return to ER or call 911 if you develop any significant signs or symptoms. I may not have addressed all of your medical illnesses or the abnormal blood work or imaging therefore please ask your PCP to obtain [place] record to follow up on all of the abnormal labs, imaging and findings that I have and have not addressed during your hospitalization. Discharging you from the hospital does not mean that your medical care ends here and now. You may still need additional work up, investigation, monitoring, and treatment to be handled from this point on by outside providers including your PCP, Specialists and other healthcare providers. For medication questions, contact your retail pharmacy and your PCP. Your medical team at [facility] ([facility]) appreciates the opportunity to work with you to get well!     Dwight Mcdonald, DO  12:14 PM

## 2023-09-27 NOTE — CARE COORDINATION
AM TEAM QUALITY ICU ROUNDS AT BEDSIDE. FAMILY IS PRESENT. PT IS AWAKE AND TALKING. CURRENTLY ON RA. CURRENTLY NPO PENDING SWALLOW SCREEN. PT/OT/SLP ON. PALL CARE FOLLOWING. CURRENTLY A 1:1 FOR SAFETY. ON CARDENE GTT. MRI BRAIN PENDING. WILL FOLLOW FOR NEEDS.
Contacted 2W CM to notify patient's primary RN in regards to discussion had with PM&R physician. Charting shows high PVRs being documented and strait caths being performed. There is concern for retention/ neurogenic bladder as PVRs remain high despite intermittent cathing. It was felt patient may benefit from urinary catheter.    Electronically signed by Anamaria Barton on 9/25/2023 at 3:10 PM
DC plan to [facility]. Pending pre-cert at this time. LSW will follow. Electronically signed by SERGEY Rice on 9/25/2023 at 8:31 AM    Notify Taisha that pt is 1:1.     LSW will follow.      Electronically signed by SERGEY Rice on 9/25/2023 at 10:04 AM
HAVE AUTH FOR [facility]. PT HAS BEEN OFF 1:1 SINCE 9/26 AT 8AM. PT CAN TRANSFER TO [facility] WHEN MED. CLEARED.    \[id number] completed by SERGEY
Inpatient Rehab referral received. Went up to ICU during their walking team rounds. Patient confused with sitter at bedside currently. Received call from CM 9/20 in regards to dtr requesting inpatient rehab as initial first choice but documentation shows conflicting information in regards to many referrals having been sent out to various SNFs. Discussed with ICU CM and it is up in the air at this time what patient would best benefit from. It was reported to me patient is refusing things and Pall Care following and discussing with dtr about patient may needing a lower level of care vs acute inpatient rehab d/t current status of patient. PT/OT evals completed. PM&R consult pending. Will follow in the mean time in case any changes arise.   Electronically signed by Emily Lee on 9/21/2023 at 10:09 AM
MET W/PT AND DTR TO ASSESS NEEDS AND DISCUSS DISCHARGE PLAN. THEY WOULD LIKE [facility] ACUTE REHAB IF ABLE. THEIR SECOND CHOICE IS [facility] BY [facility] THEN [facility]. WILL INFORM DR. DAVIS ABOUT REHAB REQUEST. PT RECOMMENDS REHAB VS SNF. SHARED RECOMMENDATIONS W/DTR. DTR HAS SNF LIST. REFERRAL SENT TO PAM AT [facility] FOR THEIR REVIEW. WILL FOLLOW. SPOKE Hesham Beal AT [address] [place] [address] P/OT NOTES AND FEELS PT MAY BE BETTER SERVED AT A SLOWER PACED SNF. REFERRAL VM MESSAGE LEFT FOR YAZMIN AT [address] [place] [address] Emily MDS DEPT. LSW/CM TO FOLLOW.  [facility] WILL CONTINUE TO FOLLOW
Met with dtr re: snf choices and she is reviewing list and chose [facility]. I sent referral to [facility]. I spoke to Dr. Nilson West re: pt and her insurance to let him know we are looking for SNF that will have non [address] [place] [address] physician to see her due to her [facility].
PRECERT PENDING FOR [facility]. 1:1 DISCONTINUED THIS MORNING (8 AM), TRIAL AVMOES. WILL CONTINUE TO FOLLOW.
PT TRANSFERRED FROM ICU TO 2W. SPOKE WITH SANDRA, WILL START PRECERT FOR [facility].
Received call from [facility] they only  have [facility] doctor to follow if they accepted pt so she is unable to go there. Patients insurance limits who can follow her post dc and [address] [place] [address] are not in contract at this time.
Referral was send to [facility]. Pending acceptance and will need pre-cert. LSW will follow.      Electronically signed by SERGEY Kumar on 9/22/2023 at 8:27 AM
SPOKE W/DTR TO DISCUSS DISCHARGE PLAN WHICH REMAINS [facility] WHEN PRECERT APPROVAL OBTAINED. LSW/CM TO FOLLOW.
SPOKE WITH SANDRA. HAVE PRECERT (GOOD THROUGH [facility] 9/28). 1:1 DISCONTINUED AT 8AM ON 9/26- NEED 1:1 DC'D FOR 24HR.
TEAM ICU ROUNDS COMPLETE. PLAN TO TRANSFER TO FLOOR TODAY. D/C PLAN REMAINS UNCHANGED AT THIS TIME.
Team ICU rounds done at bedside and per report pt is refusing her po meds this morning. She also stated she did not know who her dtr was. I met with dtr after rounds to clarify wishes for dc facility. She did give [facility] as choice. I spoke to Vicente Mendoza at [facility] to give the needed info and let dtr Bel Sheets know we will keep her updated. I spoke to Jaye from [facility] to let her know pt prob will go to SNF. Pall svetlana Marcos also in room speaking with dtr and pt.
Transport scheduled for 1800 with [facility] to transport to [facility]. Updated bedside nurse Paula Stauffer, pt and daughter Teofilo Weller.
a .)  Current functional level: Other (see comment) (currently the patient is confused, her functional status will need to be assessed. )    PT AM-PAC:   /24  OT AM-PAC:   /24    Family can provide assistance at DC: Yes  Would you like Case Management to discuss the discharge plan with any other family members/significant others, and if so, who? Yes (her daughter and son in law are present.)  Plans to Return to Present Housing: Unknown at present  Other Identified Issues/Barriers to RETURNING to current housing: depends on her condition per family  Potential Assistance needed at discharge: Other (Comment) (pt's daughter accepted information on hhc/rehab/snf services)            Potential DME:    Patient expects to discharge to: Other (comment) (to be determined)  Plan for transportation at discharge:      Financial    Payor: Joseph Mendosau / Plan: Edward Palmer ESSENTIAL/PLUS / Product Type: *No Product type* /     Does insurance require precert for SNF: Yes    Potential assistance Purchasing Medications: No  Meds-to-Beds request:        [id number] [place] [facility] [address] [facility] - P [phone] Ruth Rock [phone]  [address] [facility] [address]  Phone: [phone] Fax: [phone] Kristen Gao [facility], [facility],[address] [place] [address]  Phone: [phone] Fax: [phone]      Notes:    Factors facilitating achievement of predicted outcomes: Family support. Barriers to discharge: per her daughter, it will depend on what she is able to do. Additional Case Management Notes: pt lived mostly independently, her daughter cooks, shops and transport her. She is current with [address] [place] [address] who are providing a nurse, pt/ot. She was to start with aide service through [facility]. She has a . She has a wheeled walker and a rollator. No O2 or dialysis.      The Plan for Transition of Care is related to the
Children  Type of Home Care Services: Aide Services, Nursing Services, Housekeeping (current with [address] [facility] is to start.)  Dental Appliances: None  Vision - Corrective Lenses: Eyeglasses, At bedside  Hearing Aid: None  Personal Equipment:   Dental Appliances: None  Vision - Corrective Lenses: Eyeglasses, At bedside  Hearing Aid: None      CURRENT FUNCTIONAL LEVEL:  Physical Therapy  Bed mobility:  Bed mobility  Rolling to Left: Moderate assistance (09/25/23 1033)  Rolling to Right: Maximum assistance (09/25/23 1033)  Supine to Sit: Moderate assistance (09/26/23 0916)  Sit to Supine: Minimal assistance (09/25/23 1033)  Scooting: Maximal assistance (09/20/23 1031)  Bed Mobility Comments: Bed flat with use of hand rail; hand over hand assist for sequencing and task completion; Pt required repeated commands and tactile cues for all bed mobility. (09/26/23 0916)  Transfers:  Transfers  Sit to Stand: Moderate Assistance (09/26/23 0917)  Stand to Sit: Moderate Assistance (09/26/23 0917)  Bed to Chair: Moderate assistance (09/26/23 0917)  Comment: vc's for hand placement and technique; [address] used this date, poor management noted, repeated commands for follow through (09/26/23 0917)  Gait:   Ambulation  Surface: Level tile (09/25/23 1039)  Device: Hand-Held Assist (09/25/23 1039)  Assistance: Minimal assistance (09/25/23 1039)  Quality of Gait: short step length, flexed trunk with posterior leaning with steadying assistance, (09/25/23 1039)  Gait Deviations: Decreased step length;Decreased step height (09/25/23 1039)  Distance: 6 small steps toward HOB (09/25/23 1039)  Comments: DNT this date d/t pt's fatigue and difficulty following directions/cues. (09/26/23 9891)  Stairs:  Stairs/Curb  Stairs?: No (09/20/23 1032)  W/C mobility:         Occupational Therapy  Hand Dominance: Right  ADL  Feeding: Unable to assess(comment) (09/20/23 1537)  Grooming: Minimal assistance; Increased time to complete (09/20/23

## 2023-09-27 NOTE — DISCHARGE SUMMARY
Discharge Summary    Date: 9/22/2023  Patient Name: Freya Schroeder    YOB: 1933     Age: 80 y.o. Admit Date: 9/19/2023  Discharge Date: 9/22/2023  Discharge Condition: [place]    Admission Diagnosis  Acute CVA (cerebrovascular accident) ([facility]) [I63.9]; Altered mental status, unspecified altered mental status type [R41.82]      Discharge Diagnosis  Principal Problem:    Acute CVA (cerebrovascular accident) ([facility])  Active Problems:    Altered mental status    Impairment of balance    Impaired mobility and activities of daily living    Advanced care planning/counseling discussion    Goals of care, counseling/discussion    Palliative care encounter  Resolved Problems:    * No resolved hospital problems. [facility] Stay  Narrative of Hospital Course:  Patient comes with right-sided facial droop and right-sided weakness and aphasia all his other symptoms resolved. Was admitted to ICU for hypertensive urgency started on Cardene drip. Now her weakness is better. Start on p.o. diet talking. Diagnosis of cerebral TIA was given, since MRI of head was negative. Consultants:  IP CONSULT TO NEUROLOGY  IP CONSULT TO PALLIATIVE CARE  IP CONSULT TO PHYSICAL MEDICINE REHAB    Surgeries/procedures Performed:      Treatments:            Discharge Plan/Disposition:  Home    Hospital/Incidental Findings Requiring Follow Up:    Patient Instructions:    Diet:    Activity:  For number of days (if applicable): Other Instructions:    Provider Follow-Up:   No follow-ups on file.      Significant Diagnostic Studies:    Recent Labs:  Admission on 09/19/2023  Lactic Acid                                   Date: 09/19/2023  Value: 1.9         Ref range: 0.5 - 2.2 mmol/L   Status: Final  Sodium                                        Date: 09/19/2023  Value: 136         Ref range: 135 - 144 mEq/L    Status: Final  Potassium                                     Date: 09/19/2023  Value: 4.9         Ref range: 3.4 - 4.9 mEq/L
reasonably achievable. COMPARISON: None. HISTORY: ORDERING SYSTEM PROVIDED HISTORY: Trauma TECHNOLOGIST PROVIDED HISTORY: Reason for exam:->Trauma Decision Support Exception - unselect if not a suspected or confirmed emergency medical condition->Emergency Medical Condition (MA) What reading provider will be dictating this exam?->CRC FINDINGS: The ring of C1 is intact as is the dense. There is no compression fracture of the cervical spine. No jumped or perched facet is noted. Multilevel degenerative disc and degenerative joint disease is noted. The prevertebral soft tissues are unremarkable. The airway is widely patent. Images through the lung apices are negative for a pneumothorax. 1. There is no acute compression fracture or subluxation of the cervical spine. 2. Mild multilevel degenerative disc and degenerative joint disease. .     CT HEAD WO CONTRAST    Result Date: 9/19/2023  EXAMINATION: CT OF THE HEAD WITHOUT CONTRAST  9/19/2023 2:47 pm TECHNIQUE: CT of the head was performed without the administration of intravenous contrast. Automated exposure control, iterative reconstruction, and/or weight based adjustment of the mA/kV was utilized to reduce the radiation dose to as low as reasonably achievable. COMPARISON: None. HISTORY: ORDERING SYSTEM PROVIDED HISTORY: r/o CVA TECHNOLOGIST PROVIDED HISTORY: Has a \"code stroke\" or \"stroke alert\" been called? ->No Reason for exam:->r/o CVA Decision Support Exception - unselect if not a suspected or confirmed emergency medical condition->Emergency Medical Condition (MA) What reading provider will be dictating this exam?->CRC FINDINGS: BRAIN/VENTRICLES: There is no acute intracranial hemorrhage, mass effect or midline shift. No abnormal extra-axial fluid collection. The gray-white differentiation is maintained without evidence of an acute infarct. There is no evidence of hydrocephalus.   Ventricles are mildly to moderately enlarged with sulci mildly to moderately

## 2023-09-28 ENCOUNTER — OFFICE VISIT (OUTPATIENT)
Dept: GERIATRIC MEDICINE | Age: 88
End: 2023-09-28
Payer: MEDICARE

## 2023-09-28 DIAGNOSIS — I73.9 VASCULAR DISORDER OF EXTREMITY (HCC): ICD-10-CM

## 2023-09-28 DIAGNOSIS — I63.9 ACUTE CVA (CEREBROVASCULAR ACCIDENT) (HCC): Primary | ICD-10-CM

## 2023-09-28 DIAGNOSIS — R33.9 URINARY RETENTION: ICD-10-CM

## 2023-09-28 DIAGNOSIS — E11.65 POORLY CONTROLLED DIABETES MELLITUS (HCC): ICD-10-CM

## 2023-09-28 DIAGNOSIS — R13.10 DYSPHAGIA, UNSPECIFIED TYPE: ICD-10-CM

## 2023-09-28 PROCEDURE — 99305 1ST NF CARE MODERATE MDM 35: CPT | Performed by: INTERNAL MEDICINE

## 2023-09-28 PROCEDURE — G9687 HOSPICE ANYTIME MSMT PER: HCPCS | Performed by: INTERNAL MEDICINE

## 2023-09-28 PROCEDURE — G9692 HOSP RECD BY PT DUR MSMT PER: HCPCS | Performed by: INTERNAL MEDICINE

## 2023-09-29 LAB
EKG ATRIAL RATE: 80 BPM
EKG Q-T INTERVAL: 316 MS
EKG QRS DURATION: 76 MS
EKG QTC CALCULATION (BAZETT): 382 MS
EKG R AXIS: 16 DEGREES
EKG T AXIS: 132 DEGREES
EKG VENTRICULAR RATE: 88 BPM

## 2023-10-04 ENCOUNTER — OFFICE VISIT (OUTPATIENT)
Dept: GERIATRIC MEDICINE | Age: 88
End: 2023-10-04

## 2023-10-04 DIAGNOSIS — R11.0 NAUSEA: ICD-10-CM

## 2023-10-04 DIAGNOSIS — E11.65 POORLY CONTROLLED DIABETES MELLITUS (HCC): Primary | ICD-10-CM

## 2023-10-04 DIAGNOSIS — E11.65 HYPERGLYCEMIA DUE TO DIABETES MELLITUS (HCC): ICD-10-CM

## 2023-10-04 DIAGNOSIS — R13.10 DYSPHAGIA, UNSPECIFIED TYPE: ICD-10-CM

## 2023-10-06 ENCOUNTER — OFFICE VISIT (OUTPATIENT)
Dept: GERIATRIC MEDICINE | Age: 88
End: 2023-10-06
Payer: MEDICARE

## 2023-10-06 DIAGNOSIS — Z51.5 HOSPICE CARE PATIENT: ICD-10-CM

## 2023-10-06 DIAGNOSIS — J18.9 PNEUMONIA OF BOTH LOWER LOBES DUE TO INFECTIOUS ORGANISM: Primary | ICD-10-CM

## 2023-10-06 PROCEDURE — 99308 SBSQ NF CARE LOW MDM 20: CPT | Performed by: PHYSICIAN ASSISTANT

## 2023-10-06 PROCEDURE — G9692 HOSP RECD BY PT DUR MSMT PER: HCPCS | Performed by: PHYSICIAN ASSISTANT

## 2023-10-07 ENCOUNTER — APPOINTMENT (OUTPATIENT)
Dept: GENERAL RADIOLOGY | Age: 88
End: 2023-10-07
Payer: MEDICARE

## 2023-10-07 ENCOUNTER — APPOINTMENT (OUTPATIENT)
Dept: CT IMAGING | Age: 88
End: 2023-10-07
Payer: MEDICARE

## 2023-10-07 ENCOUNTER — HOSPITAL ENCOUNTER (INPATIENT)
Age: 88
LOS: 7 days | Discharge: NURSING, REHAB OR LONG-TERM CARE | End: 2023-10-14
Attending: INTERNAL MEDICINE | Admitting: INTERNAL MEDICINE
Payer: MEDICARE

## 2023-10-07 DIAGNOSIS — R79.89 ELEVATED TROPONIN: ICD-10-CM

## 2023-10-07 DIAGNOSIS — N17.9 AKI (ACUTE KIDNEY INJURY) (HCC): ICD-10-CM

## 2023-10-07 DIAGNOSIS — R33.9 RETENTION OF URINE: ICD-10-CM

## 2023-10-07 DIAGNOSIS — E87.1 HYPONATREMIA: ICD-10-CM

## 2023-10-07 DIAGNOSIS — J18.9 PNEUMONIA OF BOTH LOWER LOBES DUE TO INFECTIOUS ORGANISM: Primary | ICD-10-CM

## 2023-10-07 DIAGNOSIS — I50.9 NEW ONSET OF CONGESTIVE HEART FAILURE (HCC): ICD-10-CM

## 2023-10-07 LAB
ALBUMIN SERPL-MCNC: 3 G/DL (ref 3.5–4.6)
ALP SERPL-CCNC: 74 U/L (ref 40–130)
ALT SERPL-CCNC: 11 U/L (ref 0–33)
ANION GAP SERPL CALCULATED.3IONS-SCNC: 15 MEQ/L (ref 9–15)
AST SERPL-CCNC: 19 U/L (ref 0–35)
B PARAP IS1001 DNA NPH QL NAA+NON-PROBE: NOT DETECTED
B PERT.PT PRMT NPH QL NAA+NON-PROBE: NOT DETECTED
BACTERIA URNS QL MICRO: NEGATIVE /HPF
BASOPHILS # BLD: 0 K/UL (ref 0–0.2)
BASOPHILS NFR BLD: 0.1 %
BILIRUB SERPL-MCNC: <0.2 MG/DL (ref 0.2–0.7)
BILIRUB UR QL STRIP: NEGATIVE
BNP BLD-MCNC: NORMAL PG/ML
BUN SERPL-MCNC: 46 MG/DL (ref 8–23)
C PNEUM DNA NPH QL NAA+NON-PROBE: NOT DETECTED
CALCIUM SERPL-MCNC: 8.2 MG/DL (ref 8.5–9.9)
CHLORIDE SERPL-SCNC: 89 MEQ/L (ref 95–107)
CHP ED QC CHECK: NORMAL
CLARITY UR: CLEAR
CO2 SERPL-SCNC: 16 MEQ/L (ref 20–31)
COLOR UR: YELLOW
CREAT SERPL-MCNC: 3.15 MG/DL (ref 0.5–0.9)
EOSINOPHIL # BLD: 0.1 K/UL (ref 0–0.7)
EOSINOPHIL NFR BLD: 0.5 %
EPI CELLS #/AREA URNS AUTO: ABNORMAL /HPF (ref 0–5)
ERYTHROCYTE [DISTWIDTH] IN BLOOD BY AUTOMATED COUNT: 13 % (ref 11.5–14.5)
FLUAV RNA NPH QL NAA+NON-PROBE: NOT DETECTED
FLUBV RNA NPH QL NAA+NON-PROBE: NOT DETECTED
GLOBULIN SER CALC-MCNC: 2.6 G/DL (ref 2.3–3.5)
GLUCOSE BLD-MCNC: 149 MG/DL
GLUCOSE BLD-MCNC: 149 MG/DL (ref 70–99)
GLUCOSE SERPL-MCNC: 97 MG/DL (ref 70–99)
GLUCOSE UR STRIP-MCNC: NEGATIVE MG/DL
HADV DNA NPH QL NAA+NON-PROBE: NOT DETECTED
HCOV 229E RNA NPH QL NAA+NON-PROBE: NOT DETECTED
HCOV HKU1 RNA NPH QL NAA+NON-PROBE: NOT DETECTED
HCOV NL63 RNA NPH QL NAA+NON-PROBE: NOT DETECTED
HCOV OC43 RNA NPH QL NAA+NON-PROBE: NOT DETECTED
HCT VFR BLD AUTO: 28.6 % (ref 37–47)
HGB BLD-MCNC: 9.9 G/DL (ref 12–16)
HGB UR QL STRIP: NEGATIVE
HMPV RNA NPH QL NAA+NON-PROBE: NOT DETECTED
HPIV1 RNA NPH QL NAA+NON-PROBE: NOT DETECTED
HPIV2 RNA NPH QL NAA+NON-PROBE: NOT DETECTED
HPIV3 RNA NPH QL NAA+NON-PROBE: NOT DETECTED
HPIV4 RNA NPH QL NAA+NON-PROBE: NOT DETECTED
HYALINE CASTS #/AREA URNS AUTO: ABNORMAL /HPF (ref 0–5)
KETONES UR STRIP-MCNC: NEGATIVE MG/DL
LACTATE BLDV-SCNC: 1.3 MMOL/L (ref 0.5–2.2)
LEUKOCYTE ESTERASE UR QL STRIP: NEGATIVE
LYMPHOCYTES # BLD: 0.7 K/UL (ref 1–4.8)
LYMPHOCYTES NFR BLD: 5.8 %
M PNEUMO DNA NPH QL NAA+NON-PROBE: NOT DETECTED
MAGNESIUM SERPL-MCNC: 2.1 MG/DL (ref 1.7–2.4)
MCH RBC QN AUTO: 29.7 PG (ref 27–31.3)
MCHC RBC AUTO-ENTMCNC: 34.6 % (ref 33–37)
MCV RBC AUTO: 85.9 FL (ref 79.4–94.8)
MONOCYTES # BLD: 0.7 K/UL (ref 0.2–0.8)
MONOCYTES NFR BLD: 6.1 %
NEUTROPHILS # BLD: 10.6 K/UL (ref 1.4–6.5)
NEUTS SEG NFR BLD: 87 %
NITRITE UR QL STRIP: NEGATIVE
PERFORMED ON: ABNORMAL
PH UR STRIP: 5 [PH] (ref 5–9)
PLATELET # BLD AUTO: 270 K/UL (ref 130–400)
POTASSIUM SERPL-SCNC: 4.4 MEQ/L (ref 3.4–4.9)
PROCALCITONIN SERPL IA-MCNC: 0.26 NG/ML (ref 0–0.15)
PROT SERPL-MCNC: 5.6 G/DL (ref 6.3–8)
PROT UR STRIP-MCNC: >=300 MG/DL
RBC # BLD AUTO: 3.33 M/UL (ref 4.2–5.4)
RBC #/AREA URNS AUTO: ABNORMAL /HPF (ref 0–5)
RSV RNA NPH QL NAA+NON-PROBE: NOT DETECTED
RV+EV RNA NPH QL NAA+NON-PROBE: NOT DETECTED
SARS-COV-2 RNA NPH QL NAA+NON-PROBE: NOT DETECTED
SODIUM SERPL-SCNC: 120 MEQ/L (ref 135–144)
SP GR UR STRIP: 1.01 (ref 1–1.03)
TROPONIN, HIGH SENSITIVITY: 411 NG/L (ref 0–19)
URINE REFLEX TO CULTURE: ABNORMAL
UROBILINOGEN UR STRIP-ACNC: 0.2 E.U./DL
WBC # BLD AUTO: 12.2 K/UL (ref 4.8–10.8)
WBC #/AREA URNS AUTO: ABNORMAL /HPF (ref 0–5)

## 2023-10-07 PROCEDURE — 80053 COMPREHEN METABOLIC PANEL: CPT

## 2023-10-07 PROCEDURE — 87040 BLOOD CULTURE FOR BACTERIA: CPT

## 2023-10-07 PROCEDURE — 70450 CT HEAD/BRAIN W/O DYE: CPT

## 2023-10-07 PROCEDURE — 93005 ELECTROCARDIOGRAM TRACING: CPT | Performed by: PHYSICIAN ASSISTANT

## 2023-10-07 PROCEDURE — 2580000003 HC RX 258: Performed by: PHYSICIAN ASSISTANT

## 2023-10-07 PROCEDURE — 96375 TX/PRO/DX INJ NEW DRUG ADDON: CPT

## 2023-10-07 PROCEDURE — 2580000003 HC RX 258: Performed by: NURSE PRACTITIONER

## 2023-10-07 PROCEDURE — 85025 COMPLETE CBC W/AUTO DIFF WBC: CPT

## 2023-10-07 PROCEDURE — 96366 THER/PROPH/DIAG IV INF ADDON: CPT

## 2023-10-07 PROCEDURE — 6360000002 HC RX W HCPCS: Performed by: PHYSICIAN ASSISTANT

## 2023-10-07 PROCEDURE — 84145 PROCALCITONIN (PCT): CPT

## 2023-10-07 PROCEDURE — 84484 ASSAY OF TROPONIN QUANT: CPT

## 2023-10-07 PROCEDURE — 96365 THER/PROPH/DIAG IV INF INIT: CPT

## 2023-10-07 PROCEDURE — 83735 ASSAY OF MAGNESIUM: CPT

## 2023-10-07 PROCEDURE — 99285 EMERGENCY DEPT VISIT HI MDM: CPT

## 2023-10-07 PROCEDURE — 0202U NFCT DS 22 TRGT SARS-COV-2: CPT

## 2023-10-07 PROCEDURE — 71250 CT THORAX DX C-: CPT

## 2023-10-07 PROCEDURE — 81001 URINALYSIS AUTO W/SCOPE: CPT

## 2023-10-07 PROCEDURE — 1210000000 HC MED SURG R&B

## 2023-10-07 PROCEDURE — 83605 ASSAY OF LACTIC ACID: CPT

## 2023-10-07 PROCEDURE — 36415 COLL VENOUS BLD VENIPUNCTURE: CPT

## 2023-10-07 PROCEDURE — 71045 X-RAY EXAM CHEST 1 VIEW: CPT

## 2023-10-07 PROCEDURE — 83880 ASSAY OF NATRIURETIC PEPTIDE: CPT

## 2023-10-07 RX ORDER — SODIUM CHLORIDE 9 MG/ML
INJECTION, SOLUTION INTRAVENOUS PRN
Status: DISCONTINUED | OUTPATIENT
Start: 2023-10-07 | End: 2023-10-14 | Stop reason: HOSPADM

## 2023-10-07 RX ORDER — SODIUM CHLORIDE 0.9 % (FLUSH) 0.9 %
5-40 SYRINGE (ML) INJECTION EVERY 12 HOURS SCHEDULED
Status: DISCONTINUED | OUTPATIENT
Start: 2023-10-07 | End: 2023-10-14 | Stop reason: HOSPADM

## 2023-10-07 RX ORDER — INSULIN LISPRO 100 [IU]/ML
0-4 INJECTION, SOLUTION INTRAVENOUS; SUBCUTANEOUS NIGHTLY
Status: DISCONTINUED | OUTPATIENT
Start: 2023-10-08 | End: 2023-10-10

## 2023-10-07 RX ORDER — FUROSEMIDE 10 MG/ML
40 INJECTION INTRAMUSCULAR; INTRAVENOUS ONCE
Status: COMPLETED | OUTPATIENT
Start: 2023-10-07 | End: 2023-10-07

## 2023-10-07 RX ORDER — ONDANSETRON 2 MG/ML
4 INJECTION INTRAMUSCULAR; INTRAVENOUS EVERY 6 HOURS PRN
Status: DISCONTINUED | OUTPATIENT
Start: 2023-10-07 | End: 2023-10-14 | Stop reason: HOSPADM

## 2023-10-07 RX ORDER — ACETAMINOPHEN 325 MG/1
650 TABLET ORAL EVERY 6 HOURS PRN
Status: DISCONTINUED | OUTPATIENT
Start: 2023-10-07 | End: 2023-10-14 | Stop reason: HOSPADM

## 2023-10-07 RX ORDER — DEXTROSE MONOHYDRATE 100 MG/ML
INJECTION, SOLUTION INTRAVENOUS CONTINUOUS PRN
Status: DISCONTINUED | OUTPATIENT
Start: 2023-10-07 | End: 2023-10-14 | Stop reason: HOSPADM

## 2023-10-07 RX ORDER — POLYETHYLENE GLYCOL 3350 17 G/17G
17 POWDER, FOR SOLUTION ORAL DAILY PRN
Status: DISCONTINUED | OUTPATIENT
Start: 2023-10-07 | End: 2023-10-14 | Stop reason: HOSPADM

## 2023-10-07 RX ORDER — SODIUM CHLORIDE 0.9 % (FLUSH) 0.9 %
5-40 SYRINGE (ML) INJECTION PRN
Status: DISCONTINUED | OUTPATIENT
Start: 2023-10-07 | End: 2023-10-14 | Stop reason: HOSPADM

## 2023-10-07 RX ORDER — HEPARIN SODIUM 5000 [USP'U]/ML
5000 INJECTION, SOLUTION INTRAVENOUS; SUBCUTANEOUS EVERY 8 HOURS SCHEDULED
Status: DISCONTINUED | OUTPATIENT
Start: 2023-10-08 | End: 2023-10-14 | Stop reason: HOSPADM

## 2023-10-07 RX ORDER — GLUCAGON 1 MG/ML
1 KIT INJECTION PRN
Status: DISCONTINUED | OUTPATIENT
Start: 2023-10-07 | End: 2023-10-14 | Stop reason: HOSPADM

## 2023-10-07 RX ORDER — ONDANSETRON 4 MG/1
4 TABLET, ORALLY DISINTEGRATING ORAL EVERY 8 HOURS PRN
Status: DISCONTINUED | OUTPATIENT
Start: 2023-10-07 | End: 2023-10-14 | Stop reason: HOSPADM

## 2023-10-07 RX ORDER — ENOXAPARIN SODIUM 100 MG/ML
40 INJECTION SUBCUTANEOUS DAILY
Status: DISCONTINUED | OUTPATIENT
Start: 2023-10-08 | End: 2023-10-07 | Stop reason: CLARIF

## 2023-10-07 RX ORDER — INSULIN LISPRO 100 [IU]/ML
0-8 INJECTION, SOLUTION INTRAVENOUS; SUBCUTANEOUS
Status: DISCONTINUED | OUTPATIENT
Start: 2023-10-08 | End: 2023-10-10

## 2023-10-07 RX ORDER — ACETAMINOPHEN 650 MG/1
650 SUPPOSITORY RECTAL EVERY 6 HOURS PRN
Status: DISCONTINUED | OUTPATIENT
Start: 2023-10-07 | End: 2023-10-14 | Stop reason: HOSPADM

## 2023-10-07 RX ADMIN — Medication 10 ML: at 22:33

## 2023-10-07 RX ADMIN — PIPERACILLIN AND TAZOBACTAM 3375 MG: 3; .375 INJECTION, POWDER, LYOPHILIZED, FOR SOLUTION INTRAVENOUS at 20:05

## 2023-10-07 RX ADMIN — FUROSEMIDE 40 MG: 10 INJECTION, SOLUTION INTRAMUSCULAR; INTRAVENOUS at 20:01

## 2023-10-07 ASSESSMENT — ENCOUNTER SYMPTOMS
ABDOMINAL PAIN: 0
BACK PAIN: 0
PHOTOPHOBIA: 0
COUGH: 1
DIARRHEA: 0
SHORTNESS OF BREATH: 1
RHINORRHEA: 0
COUGH: 0
SORE THROAT: 0
EYE PAIN: 0
NAUSEA: 0
SHORTNESS OF BREATH: 0
VOMITING: 0

## 2023-10-07 ASSESSMENT — PAIN SCALES - GENERAL
PAINLEVEL_OUTOF10: 0
PAINLEVEL_OUTOF10: 0

## 2023-10-07 ASSESSMENT — PAIN SCALES - WONG BAKER
WONGBAKER_NUMERICALRESPONSE: 0
WONGBAKER_NUMERICALRESPONSE: 0

## 2023-10-07 NOTE — ED TRIAGE NOTES
Pt to ER today c/o lethargic and altered mental status. Pt fell yesterday out of bed. Today pt appears lethargic and unable to stay alert during triage. Pt is A+Ox4, MSP's intact. Pt has delayed responses.    Pupils equal and reactive

## 2023-10-07 NOTE — ED PROVIDER NOTES
[facility] ED  eMERGENCY dEPARTMENTeNCOUnter      Pt Name: Nidhi Rodriguez  MRN: [id number]  [address] [facility] [place] 3/6/1933  Date ofevaluation: 10/7/2023  Provider: Donald Tom PA-C    CHIEF COMPLAINT       Chief Complaint   Patient presents with    Altered Mental Status     Last known well yesterday         HISTORY OF PRESENT ILLNESS   (Location/Symptom, Timing/Onset,Context/Setting, Quality, Duration, Modifying Factors, Severity)  Note limiting factors. Nidhi Rodriguez is a 80 y.o. female who presents to the emergency department sleepiness. Patient was at the nursing home and yesterday they took an x-ray due to patient having cough and sore throat and said that she had pneumonia and they started her on Zosyn. Per the family she has been more lethargic as well as the nursing home states that she has been sleeping more than her baseline. She is been at the nursing home for about 2 weeks for rehab. She did have recent hospital admission that I reviewed for a potential cerebral TIA as well as a urinary tract infection. Patient denies any headache vision changes chest pain shortness of breath nausea vomiting abdominal pain. Per nurses pt did have fall out of bed yesterday. HPI    NursingNotes were reviewed. REVIEW OF SYSTEMS    (2-9 systems for level 4, 10 or more for level 5)     Review of Systems   Constitutional:  Positive for fatigue. Negative for chills, diaphoresis and fever. HENT:  Negative for congestion, rhinorrhea and sore throat. Eyes:  Negative for photophobia and pain. Respiratory:  Negative for cough and shortness of breath. Cardiovascular:  Positive for leg swelling. Negative for chest pain and palpitations. Gastrointestinal:  Negative for abdominal pain, diarrhea, nausea and vomiting. Genitourinary:  Negative for dysuria and flank pain. Musculoskeletal:  Negative for back pain. Skin:  Negative for rash. Neurological:  Negative for dizziness, light-headedness and headaches.

## 2023-10-08 LAB
ALBUMIN SERPL-MCNC: 2.8 G/DL (ref 3.5–4.6)
ALP SERPL-CCNC: 65 U/L (ref 40–130)
ALT SERPL-CCNC: 8 U/L (ref 0–33)
ANION GAP SERPL CALCULATED.3IONS-SCNC: 12 MEQ/L (ref 9–15)
ANION GAP SERPL CALCULATED.3IONS-SCNC: 15 MEQ/L (ref 9–15)
ANION GAP SERPL CALCULATED.3IONS-SCNC: 16 MEQ/L (ref 9–15)
AST SERPL-CCNC: 18 U/L (ref 0–35)
BASE EXCESS ARTERIAL: -8 (ref -3–3)
BASOPHILS # BLD: 0 K/UL (ref 0–0.2)
BASOPHILS NFR BLD: 0 %
BILIRUB SERPL-MCNC: <0.2 MG/DL (ref 0.2–0.7)
BUN SERPL-MCNC: 45 MG/DL (ref 8–23)
BUN SERPL-MCNC: 46 MG/DL (ref 8–23)
C PEPTIDE SERPL-MCNC: 8.7 NG/ML (ref 1.1–4.4)
CALCIUM IONIZED: 1.13 MMOL/L (ref 1.12–1.32)
CALCIUM SERPL-MCNC: 8 MG/DL (ref 8.5–9.9)
CALCIUM SERPL-MCNC: 8.1 MG/DL (ref 8.5–9.9)
CALCIUM SERPL-MCNC: 8.1 MG/DL (ref 8.5–9.9)
CALCIUM SERPL-MCNC: 8.3 MG/DL (ref 8.5–9.9)
CHLORIDE SERPL-SCNC: 89 MEQ/L (ref 95–107)
CHLORIDE SERPL-SCNC: 90 MEQ/L (ref 95–107)
CHLORIDE SERPL-SCNC: 91 MEQ/L (ref 95–107)
CO2 SERPL-SCNC: 17 MEQ/L (ref 20–31)
CO2 SERPL-SCNC: 18 MEQ/L (ref 20–31)
CO2 SERPL-SCNC: 19 MEQ/L (ref 20–31)
CO2 SERPL-SCNC: 19 MEQ/L (ref 20–31)
CORTISOL COLLECTION INFO: ABNORMAL
CORTISOL: 19.8 UG/DL (ref 2.7–18.4)
CREAT SERPL-MCNC: 3.16 MG/DL (ref 0.5–0.9)
CREAT SERPL-MCNC: 3.17 MG/DL (ref 0.5–0.9)
CREAT SERPL-MCNC: 3.2 MG/DL (ref 0.5–0.9)
CREAT SERPL-MCNC: 3.29 MG/DL (ref 0.5–0.9)
CREAT SERPL-MCNC: 3.31 MG/DL (ref 0.5–0.9)
CREAT SERPL-MCNC: 3.35 MG/DL (ref 0.5–0.9)
CREAT UR-MCNC: 73.4 MG/DL
CREAT UR-MCNC: 74.6 MG/DL
EOSINOPHIL # BLD: 0.1 K/UL (ref 0–0.7)
EOSINOPHIL NFR BLD: 0.7 %
ERYTHROCYTE [DISTWIDTH] IN BLOOD BY AUTOMATED COUNT: 13 % (ref 11.5–14.5)
GLOBULIN SER CALC-MCNC: 2.3 G/DL (ref 2.3–3.5)
GLUCOSE BLD-MCNC: 104 MG/DL (ref 70–99)
GLUCOSE BLD-MCNC: 111 MG/DL (ref 70–99)
GLUCOSE BLD-MCNC: 119 MG/DL (ref 70–99)
GLUCOSE BLD-MCNC: 125 MG/DL (ref 70–99)
GLUCOSE BLD-MCNC: 150 MG/DL (ref 70–99)
GLUCOSE BLD-MCNC: 176 MG/DL (ref 70–99)
GLUCOSE BLD-MCNC: 222 MG/DL (ref 70–99)
GLUCOSE BLD-MCNC: 271 MG/DL (ref 70–99)
GLUCOSE BLD-MCNC: 345 MG/DL (ref 70–99)
GLUCOSE BLD-MCNC: 40 MG/DL (ref 70–99)
GLUCOSE BLD-MCNC: 43 MG/DL (ref 70–99)
GLUCOSE BLD-MCNC: 54 MG/DL (ref 70–99)
GLUCOSE BLD-MCNC: 60 MG/DL (ref 70–99)
GLUCOSE BLD-MCNC: 65 MG/DL (ref 70–99)
GLUCOSE BLD-MCNC: 66 MG/DL (ref 70–99)
GLUCOSE BLD-MCNC: 81 MG/DL (ref 70–99)
GLUCOSE BLD-MCNC: 88 MG/DL (ref 70–99)
GLUCOSE BLD-MCNC: 94 MG/DL (ref 70–99)
GLUCOSE SERPL-MCNC: 112 MG/DL (ref 70–99)
GLUCOSE SERPL-MCNC: 117 MG/DL (ref 70–99)
GLUCOSE SERPL-MCNC: 118 MG/DL (ref 70–99)
GLUCOSE SERPL-MCNC: 119 MG/DL (ref 70–99)
GLUCOSE SERPL-MCNC: 62 MG/DL (ref 70–99)
GLUCOSE SERPL-MCNC: 66 MG/DL (ref 70–99)
HBA1C MFR BLD: 8.5 % (ref 4.8–5.9)
HCO3 ARTERIAL: 17.9 MMOL/L (ref 21–29)
HCT VFR BLD AUTO: 26 % (ref 36–48)
HCT VFR BLD AUTO: 27.4 % (ref 37–47)
HGB BLD CALC-MCNC: 8.8 GM/DL (ref 12–16)
HGB BLD-MCNC: 9.4 G/DL (ref 12–16)
LACTATE: 0.31 MMOL/L (ref 0.4–2)
LYMPHOCYTES # BLD: 0.7 K/UL (ref 1–4.8)
LYMPHOCYTES NFR BLD: 7.1 %
MAGNESIUM SERPL-MCNC: 2.1 MG/DL (ref 1.7–2.4)
MAGNESIUM SERPL-MCNC: 2.1 MG/DL (ref 1.7–2.4)
MCH RBC QN AUTO: 29.7 PG (ref 27–31.3)
MCHC RBC AUTO-ENTMCNC: 34.3 % (ref 33–37)
MCV RBC AUTO: 86.4 FL (ref 79.4–94.8)
MONOCYTES # BLD: 0.7 K/UL (ref 0.2–0.8)
MONOCYTES NFR BLD: 6.3 %
NEUTROPHILS # BLD: 8.9 K/UL (ref 1.4–6.5)
NEUTS SEG NFR BLD: 85.4 %
O2 SAT, ARTERIAL: 94 % (ref 93–100)
OSMOLALITY URINE: 234 MOSM/KG (ref 80–1300)
PCO2 ARTERIAL: 32 MM HG (ref 35–45)
PERFORMED ON: ABNORMAL
PERFORMED ON: NORMAL
PH ARTERIAL: 7.35 (ref 7.35–7.45)
PLATELET # BLD AUTO: 243 K/UL (ref 130–400)
PO2 ARTERIAL: 73 MM HG (ref 75–108)
POC CHLORIDE: 94 MEQ/L (ref 99–110)
POC CREATININE: 3.6 MG/DL (ref 0.6–1.2)
POC SAMPLE TYPE: ABNORMAL
POTASSIUM SERPL-SCNC: 3.8 MEQ/L (ref 3.5–5.1)
POTASSIUM SERPL-SCNC: 4.3 MEQ/L (ref 3.4–4.9)
POTASSIUM SERPL-SCNC: 4.4 MEQ/L (ref 3.4–4.9)
POTASSIUM UR-SCNC: 26.7 MEQ/L
PROT SERPL-MCNC: 5.1 G/DL (ref 6.3–8)
RBC # BLD AUTO: 3.17 M/UL (ref 4.2–5.4)
SERUM OSMOLALITY: 270 MOSM/KG (ref 275–295)
SODIUM BLD-SCNC: 120 MEQ/L (ref 136–145)
SODIUM SERPL-SCNC: 122 MEQ/L (ref 135–144)
SODIUM SERPL-SCNC: 123 MEQ/L (ref 135–144)
SODIUM SERPL-SCNC: 123 MEQ/L (ref 135–144)
SODIUM UR-SCNC: 29 MEQ/L
TCO2 ARTERIAL: 19 MMOL/L (ref 21–32)
TROPONIN, HIGH SENSITIVITY: 417 NG/L (ref 0–19)
URIC ACID, UR: 20.8 MG/DL
WBC # BLD AUTO: 10.4 K/UL (ref 4.8–10.8)

## 2023-10-08 PROCEDURE — 51798 US URINE CAPACITY MEASURE: CPT

## 2023-10-08 PROCEDURE — 85014 HEMATOCRIT: CPT

## 2023-10-08 PROCEDURE — 80053 COMPREHEN METABOLIC PANEL: CPT

## 2023-10-08 PROCEDURE — 84295 ASSAY OF SERUM SODIUM: CPT

## 2023-10-08 PROCEDURE — 82435 ASSAY OF BLOOD CHLORIDE: CPT

## 2023-10-08 PROCEDURE — 82565 ASSAY OF CREATININE: CPT

## 2023-10-08 PROCEDURE — 85025 COMPLETE CBC W/AUTO DIFF WBC: CPT

## 2023-10-08 PROCEDURE — 84133 ASSAY OF URINE POTASSIUM: CPT

## 2023-10-08 PROCEDURE — 6370000000 HC RX 637 (ALT 250 FOR IP): Performed by: NURSE PRACTITIONER

## 2023-10-08 PROCEDURE — 82803 BLOOD GASES ANY COMBINATION: CPT

## 2023-10-08 PROCEDURE — 2580000003 HC RX 258: Performed by: INTERNAL MEDICINE

## 2023-10-08 PROCEDURE — 6360000002 HC RX W HCPCS: Performed by: INTERNAL MEDICINE

## 2023-10-08 PROCEDURE — 6370000000 HC RX 637 (ALT 250 FOR IP): Performed by: INTERNAL MEDICINE

## 2023-10-08 PROCEDURE — 83036 HEMOGLOBIN GLYCOSYLATED A1C: CPT

## 2023-10-08 PROCEDURE — 84132 ASSAY OF SERUM POTASSIUM: CPT

## 2023-10-08 PROCEDURE — 84206 ASSAY OF PROINSULIN: CPT

## 2023-10-08 PROCEDURE — 84560 ASSAY OF URINE/URIC ACID: CPT

## 2023-10-08 PROCEDURE — 1210000000 HC MED SURG R&B

## 2023-10-08 PROCEDURE — 2580000003 HC RX 258: Performed by: NURSE PRACTITIONER

## 2023-10-08 PROCEDURE — 84300 ASSAY OF URINE SODIUM: CPT

## 2023-10-08 PROCEDURE — 84681 ASSAY OF C-PEPTIDE: CPT

## 2023-10-08 PROCEDURE — 92610 EVALUATE SWALLOWING FUNCTION: CPT

## 2023-10-08 PROCEDURE — 36600 WITHDRAWAL OF ARTERIAL BLOOD: CPT

## 2023-10-08 PROCEDURE — 83930 ASSAY OF BLOOD OSMOLALITY: CPT

## 2023-10-08 PROCEDURE — 82948 REAGENT STRIP/BLOOD GLUCOSE: CPT

## 2023-10-08 PROCEDURE — 2500000003 HC RX 250 WO HCPCS: Performed by: INTERNAL MEDICINE

## 2023-10-08 PROCEDURE — 83735 ASSAY OF MAGNESIUM: CPT

## 2023-10-08 PROCEDURE — 36415 COLL VENOUS BLD VENIPUNCTURE: CPT

## 2023-10-08 PROCEDURE — 82330 ASSAY OF CALCIUM: CPT

## 2023-10-08 PROCEDURE — 6360000002 HC RX W HCPCS: Performed by: NURSE PRACTITIONER

## 2023-10-08 PROCEDURE — 83935 ASSAY OF URINE OSMOLALITY: CPT

## 2023-10-08 PROCEDURE — 82570 ASSAY OF URINE CREATININE: CPT

## 2023-10-08 PROCEDURE — 87040 BLOOD CULTURE FOR BACTERIA: CPT

## 2023-10-08 PROCEDURE — 83605 ASSAY OF LACTIC ACID: CPT

## 2023-10-08 PROCEDURE — 82533 TOTAL CORTISOL: CPT

## 2023-10-08 RX ORDER — ASPIRIN 81 MG/1
81 TABLET, CHEWABLE ORAL DAILY
Status: DISCONTINUED | OUTPATIENT
Start: 2023-10-08 | End: 2023-10-14 | Stop reason: HOSPADM

## 2023-10-08 RX ORDER — 3% SODIUM CHLORIDE 3 G/100ML
50 INJECTION, SOLUTION INTRAVENOUS CONTINUOUS
Status: DISPENSED | OUTPATIENT
Start: 2023-10-08 | End: 2023-10-08

## 2023-10-08 RX ORDER — DEXTROSE MONOHYDRATE 100 MG/ML
INJECTION, SOLUTION INTRAVENOUS CONTINUOUS
Status: DISCONTINUED | OUTPATIENT
Start: 2023-10-08 | End: 2023-10-09

## 2023-10-08 RX ORDER — WATER 1000 ML/1000ML
INJECTION, SOLUTION INTRAVENOUS
Status: DISPENSED
Start: 2023-10-08 | End: 2023-10-08

## 2023-10-08 RX ORDER — DEXTROSE MONOHYDRATE, SODIUM CHLORIDE, AND POTASSIUM CHLORIDE 50; 1.49; 9 G/1000ML; G/1000ML; G/1000ML
INJECTION, SOLUTION INTRAVENOUS CONTINUOUS
Status: DISCONTINUED | OUTPATIENT
Start: 2023-10-08 | End: 2023-10-08

## 2023-10-08 RX ORDER — GLUCAGON 1 MG/ML
1 KIT INJECTION ONCE
Status: COMPLETED | OUTPATIENT
Start: 2023-10-08 | End: 2023-10-08

## 2023-10-08 RX ORDER — ROSUVASTATIN CALCIUM 10 MG/1
10 TABLET, COATED ORAL NIGHTLY
Status: DISCONTINUED | OUTPATIENT
Start: 2023-10-08 | End: 2023-10-14 | Stop reason: HOSPADM

## 2023-10-08 RX ADMIN — POTASSIUM CHLORIDE, DEXTROSE MONOHYDRATE AND SODIUM CHLORIDE: 150; 5; 900 INJECTION, SOLUTION INTRAVENOUS at 09:16

## 2023-10-08 RX ADMIN — DEXTROSE MONOHYDRATE 125 ML: 100 INJECTION, SOLUTION INTRAVENOUS at 02:38

## 2023-10-08 RX ADMIN — GLUCAGON 1 MG: 1 INJECTION, POWDER, LYOPHILIZED, FOR SOLUTION INTRAMUSCULAR; INTRAVENOUS at 07:33

## 2023-10-08 RX ADMIN — PIPERACILLIN AND TAZOBACTAM 3375 MG: 3; .375 INJECTION, POWDER, LYOPHILIZED, FOR SOLUTION INTRAVENOUS at 12:26

## 2023-10-08 RX ADMIN — ASPIRIN 81 MG CHEWABLE TABLET 81 MG: 81 TABLET CHEWABLE at 08:41

## 2023-10-08 RX ADMIN — HEPARIN SODIUM 5000 UNITS: 5000 INJECTION INTRAVENOUS; SUBCUTANEOUS at 08:41

## 2023-10-08 RX ADMIN — Medication 10 ML: at 20:27

## 2023-10-08 RX ADMIN — METHYLPREDNISOLONE SODIUM SUCCINATE 40 MG: 40 INJECTION, POWDER, FOR SOLUTION INTRAMUSCULAR; INTRAVENOUS at 11:07

## 2023-10-08 RX ADMIN — ROSUVASTATIN CALCIUM 10 MG: 10 TABLET, FILM COATED ORAL at 20:34

## 2023-10-08 RX ADMIN — DEXTROSE MONOHYDRATE: 100 INJECTION, SOLUTION INTRAVENOUS at 13:24

## 2023-10-08 RX ADMIN — HEPARIN SODIUM 5000 UNITS: 5000 INJECTION INTRAVENOUS; SUBCUTANEOUS at 16:21

## 2023-10-08 RX ADMIN — DEXTROSE MONOHYDRATE: 100 INJECTION, SOLUTION INTRAVENOUS at 19:57

## 2023-10-08 RX ADMIN — DEXTROSE MONOHYDRATE: 100 INJECTION, SOLUTION INTRAVENOUS at 16:28

## 2023-10-08 RX ADMIN — ONDANSETRON 4 MG: 2 INJECTION INTRAMUSCULAR; INTRAVENOUS at 16:21

## 2023-10-08 RX ADMIN — METHYLPREDNISOLONE SODIUM SUCCINATE 40 MG: 40 INJECTION, POWDER, FOR SOLUTION INTRAMUSCULAR; INTRAVENOUS at 17:45

## 2023-10-08 RX ADMIN — ACETAMINOPHEN 650 MG: 325 TABLET ORAL at 20:39

## 2023-10-08 RX ADMIN — DEXTROSE MONOHYDRATE 125 ML: 100 INJECTION, SOLUTION INTRAVENOUS at 00:36

## 2023-10-08 RX ADMIN — SODIUM CHLORIDE 50 ML/HR: 3 INJECTION, SOLUTION INTRAVENOUS at 11:22

## 2023-10-08 RX ADMIN — DEXTROSE MONOHYDRATE: 100 INJECTION, SOLUTION INTRAVENOUS at 03:47

## 2023-10-08 RX ADMIN — DEXTROSE MONOHYDRATE 250 ML: 100 INJECTION, SOLUTION INTRAVENOUS at 10:14

## 2023-10-08 RX ADMIN — ACETAMINOPHEN 650 MG: 325 TABLET ORAL at 14:57

## 2023-10-08 RX ADMIN — Medication 10 ML: at 08:42

## 2023-10-08 ASSESSMENT — PAIN SCALES - WONG BAKER
WONGBAKER_NUMERICALRESPONSE: 0

## 2023-10-08 ASSESSMENT — PAIN DESCRIPTION - LOCATION
LOCATION: HEAD
LOCATION: KNEE

## 2023-10-08 ASSESSMENT — ENCOUNTER SYMPTOMS
VOMITING: 0
DIARRHEA: 0
NAUSEA: 0
SHORTNESS OF BREATH: 0
COUGH: 1

## 2023-10-08 ASSESSMENT — PAIN DESCRIPTION - DESCRIPTORS: DESCRIPTORS: ACHING

## 2023-10-08 ASSESSMENT — PAIN SCALES - GENERAL
PAINLEVEL_OUTOF10: 5
PAINLEVEL_OUTOF10: 9

## 2023-10-08 ASSESSMENT — PAIN DESCRIPTION - ORIENTATION: ORIENTATION: RIGHT

## 2023-10-08 NOTE — CARE COORDINATION
Case Management Assessment  Initial Evaluation    Date/Time of Evaluation: 10/8/2023 11:37 AM  Assessment Completed by: SERGEY Riggins    If patient is discharged prior to next notation, then this note serves as note for discharge by case management. Patient Name: Tamanna Livingston                   YOB: 1933  Diagnosis: Hyponatremia [E87.1]  Retention of urine [R33.9]  Elevated troponin [R79.89]  AC (acute kidney injury) ([address]) [N17.9]  Pneumonia of both lower lobes due to infectious organism [J18.9]  New onset of congestive heart failure ([address]) [I50.9]                   Date / Time: 10/7/2023  6:33 PM    Patient Admission Status: Inpatient   Readmission Risk (Low < 19, Mod (19-27), High > 27): Readmission Risk Score: 17.6    Current PCP: Jonna Gowers, MD  PCP verified by CM? Yes    Chart Reviewed: Yes      History Provided by: Child/Family  Patient Orientation: Other (see comment) (sleeping)    Patient Cognition: Other (see comment) (sleeping)    Hospitalization in the last 30 days (Readmission):  No    If yes, Readmission Assessment in CM Navigator will be completed.   Readmission Assessment  Number of Days since last admission?: 8-30 days  Previous Disposition: SNF  Who is being Interviewed: Caregiver  What was the patient's/caregiver's perception as to why they think they needed to return back to the hospital?: Did not realize care needs would be so extensive  Did you visit your Primary Care Physician after you left the hospital, before you returned this time?: Yes  Did you see a specialist, such as Cardiac, Pulmonary, Orthopedic Physician, etc. after you left the hospital?: Yes  Who advised the patient to return to the hospital?: Caregiver  Does the patient report anything that got in the way of taking their medications?: No  In our efforts to provide the best possible care to you and others like you, can you think of anything that we could have done to help you after you left the hospital the first time, so that you might not have needed to return so soon?: Identify patient's health literacy needs      Advance Directives:      Code Status: DNR-CC   Patient's Primary Decision Maker is: Named in Ryan Krishna    Primary Decision MakerLansing Baumgarten - Child - [phone]    Secondary Decision Maker: Trever Davis - [phone]    Discharge Planning:    Patient lives with: Children Type of Home: Skilled Nursing Facility  Primary Care Giver:    Patient Support Systems include: Children   Current Financial resources: Medicare  Current community resources: None  Current services prior to admission: [address]            Current DME:              Type of Home Care services:  OT, PT, Nursing Services, Skilled Therapy    ADLS  Prior functional level: Assistance with the following:, Mobility  Current functional level: Assistance with the following:, Mobility    PT AM-PAC:   /24  OT AM-PAC:   /24    Family can provide assistance at DC: Yes  Would you like Case Management to discuss the discharge plan with any other family members/significant others, and if so, who?  Yes (DTR)  Plans to Return to Present Housing: Unknown at present  Other Identified Issues/Barriers to RETURNING to current housing: MEDICAL CLEARANCE  Potential Assistance needed at discharge: [address], Home Care            Potential DME:    Patient expects to discharge to: [address] [place] [address] for transportation at discharge:      Financial    Payor: [address] [place] [address] Road / Plan: Amna Goyal ESSENTIAL/PLUS / Product Type: *No Product type* /     Does insurance require precert for SNF: Yes    Potential assistance Purchasing Medications:    Meds-to-Beds request:        [id number] [employer] [facility] [place], [place] - [address] [facility] Bianka Colvin [phone] Kerline Winter [phone]  [address] [facility] [address]  Phone: [phone] Fax: [phone] - Dania Townsend

## 2023-10-08 NOTE — H&P
[facility]    HISTORY AND PHYSICAL EXAM    PATIENT NAME:  Cindy Keller    MRN:  [id number]  SERVICE DATE:  10/7/2023   SERVICE TIME:  10:22 PM    Primary Care Physician: Nilsa Yousif MD         SUBJECTIVE  CHIEF COMPLAINT:  Altered Mental Status     HPI: Patient being admitted for hyponatremia and AC. Patient presented to the ED from [facility] unit due to altered mental status and lethargy. Patient is alert and oriented x2 and complains of shortness of breath and fatigue. Patient denies any chest pain, cough, abdominal pain, nausea, vomiting, or dysuria. However, nursing home reported the patient had a cough, sore throat and an x-ray was completed showing pneumonia so they started her on Zosyn. Patient SNF unit after a TIA and encephalopathy 2 weeks ago. Patient's past medical history includes DM 2, HTN, TIA, CKD 4, depression, anemia, and urinary retention. PAST MEDICAL HISTORY:    Past Medical History:   Diagnosis Date    Back pain 07/17/2009    Balance problems 05/16/2020    Constipation 08/03/2022    Dysphagia 08/03/2022    Falls 08/03/2022    Hyperlipidemia     Hypertension     Incomplete bladder emptying 04/28/2009    Neuropathy     Piriformis syndrome 08/03/2022    Stage 3 chronic kidney disease ([address]) 08/2022    Type 2 diabetes mellitus without complication (HCC)     UTI (urinary tract infection) 08/30/2022    Varicose veins of both lower extremities 05/16/2020     PAST SURGICAL HISTORY:  No past surgical history on file. FAMILY HISTORY:  No family history on file.   SOCIAL HISTORY:    Social History     Socioeconomic History    Marital status: Unknown     Spouse name: Not on file    Number of children: Not on file    Years of education: Not on file    Highest education level: Not on file   Occupational History    Not on file   Tobacco Use    Smoking status: Unknown    Smokeless tobacco: Not on file   Vaping Use    Vaping Use: Unknown   Substance and Sexual Activity Phosphatase 74 40 - 130 U/L    ALT 11 0 - 33 U/L    AST 19 0 - 35 U/L    Globulin 2.6 2.3 - 3.5 g/dL   Lactic Acid    Collection Time: 10/07/23  7:36 PM   Result Value Ref Range    Lactic Acid 1.3 0.5 - 2.2 mmol/L   Magnesium    Collection Time: 10/07/23  7:36 PM   Result Value Ref Range    Magnesium 2.1 1.7 - 2.4 mg/dL   Troponin    Collection Time: 10/07/23  7:36 PM   Result Value Ref Range    Troponin, High Sensitivity 411 (HH) 0 - 19 ng/L   Brain Natriuretic Peptide    Collection Time: 10/07/23  7:36 PM   Result Value Ref Range    Pro-BNP 11,122 pg/mL   Procalcitonin    Collection Time: 10/07/23  7:36 PM   Result Value Ref Range    Procalcitonin 0.26 (H) 0.00 - 0.15 ng/mL   Urinalysis with Reflex to Culture    Collection Time: 10/07/23  9:53 PM    Specimen: Urine   Result Value Ref Range    Color, UA Yellow Straw/Yellow    Clarity, UA Clear Clear    Glucose, Ur Negative Negative mg/dL    Bilirubin Urine Negative Negative    Ketones, Urine Negative Negative mg/dL    Specific Gravity, UA 1.012 1.005 - 1.030    Blood, Urine Negative Negative    pH, UA 5.0 5.0 - 9.0    Protein, UA >=300 (A) Negative mg/dL    Urobilinogen, Urine 0.2 <2.0 E.U./dL    Nitrite, Urine Negative Negative    Leukocyte Esterase, Urine Negative Negative    Urine Reflex to Culture Not Indicated    Microscopic Urinalysis    Collection Time: 10/07/23  9:53 PM   Result Value Ref Range    Bacteria, UA Negative Negative /HPF    Hyaline Casts, UA 1-3 0 - 5 /HPF    WBC, UA 3-5 0 - 5 /HPF    RBC, UA 6-10 (A) 0 - 5 /HPF    Epithelial Cells, UA 0-2 0 - 5 /HPF       IMAGING:   XR CHEST PORTABLE    Result Date: 10/7/2023  Opacities are present in lung bases notable on the right which could indicate pneumonia or atelectasis. Short-term follow-up recommended. VTE Prophylaxis: low molecular weight heparin -  start     ASSESSMENT AND PLAN    Principal Problem:  Hyponatremia: Sodium 120. Possibly 2/2 fluid overload. Patient on SSRI.   Altered mental

## 2023-10-08 NOTE — PROGRESS NOTES
Pharmacy Dose Adjustment Per Protocol    Luis F Hernandez is a 80 y.o. female. Recent Labs     10/05/23  0000 10/07/23  1936   BUN 40 46*   CREATININE 2.8 3.15*   Estimated Creatinine Clearance: 12 mL/min (A) (based on SCr of 3.15 mg/dL (H)). Benjamin Rader Height: 5' 5\" (165.1 cm), Weight - Scale: 162 lb 4.8 oz (73.6 kg)    If yes to following, excluded from auto adjustment in Table 1 of policy - please contact provider with recommendations as appropriate. Include condition/exception in scratch notes.  Yes No   Trauma Service or Ortho Surgery []  [x]    COVID []  [x]    Pregnancy []  [x]        Current order:  Enoxaparin 40 mg SUBQ once daily      Plan:  Pharmacologic VTE prophylaxis modified based on patient renal function per [facility]/P&T approved protocol     Patient Weight (kg)      50.9 and below .9 101-150.9 151-174.9 175 or greater   Estimated   CrCl  (ml/min) 30 or greater []   30 mg   SUBQ daily   []   40 mg   SUBQ daily (or 30 mg BID for orthopedic cases) []  30 mg SUBQ   BID  []  40 mg   SUBQ   BID []  60mg SUBQ BID    15-29.9 []  UFH 5000   units SUBQ BID []  30 mg   SUBQ daily [] 30 mg SUBQ   daily []  40 mg SUBQ   daily [] 60 mg SUBQ   daily    Less than 15 or dialysis []  UFH 5000   units SUBQ BID [x] UFH 5000 units SUBQ TID []  UFH 7500   units   SUBQ TID       Thank Mele Lange [facility]  10/07/23  11:43 PM

## 2023-10-08 NOTE — FLOWSHEET NOTE
[address] was in speaking with the family. Family decided to continue care at the hospital for a few more days before agreeing with hospice. Called Dr. Mary Rodriguez and notified him of the hospice meeting. Per DR. Sandoval BMP changed to every 12 hours and he was made aware of the last NA at 2149- 146. Electronically signed by Tonya Otero on 10/8/2023 at 3:26 PM

## 2023-10-08 NOTE — ACP (ADVANCE CARE PLANNING)
Advance Care Planning   Healthcare Decision Maker:    Primary Decision Maker: Lyric Johns Child - [phone]    Secondary Decision Maker: Vesta Muniz - Son-in-Law - [phone]    Click here to complete Healthcare Decision Makers including selection of the Healthcare Decision Maker Relationship (ie \"Primary\"). Today we documented Decision Maker(s) consistent with Legal Next of Kin hierarchy.        If the relationship to the patient does NOT follow our state's Next of Kin hierarchy, the patient MUST complete an ACP Document to allow him/her to act on the patient's behalf. :      Electronically signed by SERGEY To on 10/8/2023 at 11:37 AM

## 2023-10-08 NOTE — FLOWSHEET NOTE
2153 148/95 [id number]   90      0639 BG 40    1 Amp of dextrose ordered 0640  0642 dex in  Glucagon     D10 ordered verbally. Cecilia Dover     0642 144/119   o2 99% pulse 99          0655 need to recheck glucose     Per dr Manuel Koehler recheck bg q30 min UFN    0644 118/42 % 91 pulse   Dennyaghi ended RR    0645     0646 114/71 pulse 96 o2 @100%

## 2023-10-08 NOTE — PROGRESS NOTES
[facility]   Facility/Department: [facility]  Speech Language Pathology  Clinical Bedside Swallow Evaluation    NAME:Rosa Smith  : 3/6/1933 (80 y.o.)   [x]   confirmed    MRN: [id number]  ROOM: [place] [id number]  ADMISSION DATE: 10/7/2023  PATIENT DIAGNOSIS(ES): Hyponatremia [E87.1]  Retention of urine [R33.9]  Elevated troponin [R79.89]  AC (acute kidney injury) ([address]) [N17.9]  Pneumonia of both lower lobes due to infectious organism [J18.9]  New onset of congestive heart failure ([address]) [I50.9]  Chief Complaint   Patient presents with    Altered Mental Status     Last known well yesterday     Patient Active Problem List    Diagnosis Date Noted    Hyponatremia 10/07/2023    History of recent fall 2023    Cardiac arrhythmia 2023    AC (acute kidney injury) ([address]) 2023    Neurogenic bladder 2023    Advanced care planning/counseling discussion 2023    Goals of care, counseling/discussion 2023    Palliative care encounter 2023    Altered mental status 2023    Impaired mobility and activities of daily living 2023    Acute CVA (cerebrovascular accident) ([address]) 2023    Impairment of balance 2023    Bursitis of hip 2022    Palpitations 2022    Piriformis syndrome 2022    Vascular disorder of extremity ([address]) 2020    Benign essential hypertension 2019    Dysphagia, unspecified 2018    Gastro-esophageal reflux disease with esophagitis 2018    Backache 2009    Urine retention 2009    Urinary incontinence 2009    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

## 2023-10-08 NOTE — CONSULTS
[facility] [place], [address] [facility]                                  CONSULTATION    PATIENT NAME: Ambrose Bailey                      :        1933  MED REC NO:   [id number]                            ROOM:       T321  ACCOUNT NO:   [de-identified]                           ADMIT DATE: 10/07/2023  PROVIDER:     Inge Trujillo DO    CONSULT DATE:  10/08/2023    RENAL CONSULTATION    HISTORY OF PRESENT ILLNESS:  A 80year-old being seen for azotemia. Approximately 3 years ago, the patient had a GFR of 34 mL per minute. On arrival to the hospital, the patient had a creatinine of 3.15 and GFR  of 14 mL per minute. She was hyponatremic with a sodium of 120 mEq and  normal potassium. The patient is mildly anemic with a hemoglobin of 9  gm. Her history has been unreliable because of low blood sugars. At  this time, they are being treated with IV fluids and boluses. Records  show that she has been diabetic and hypertensive for 40 years. She does  suffer from depression. She has known anemia, but this has been  chronic. The patient had recently developed pneumonia and started on  Zosyn in the nursing home. The patient again is not reliable with  regards to her history that she did deny gross hematuria, urinary tract  infections, or kidney stones. She states that she has been taking  nonsteroidals at the nursing home? PAST MEDICAL HISTORY:  CKD IV, diabetes mellitus type 2, neuropathy,  hypertension, and hyperlipidemia. PAST SURGICAL HISTORY:  None on the chart related. MEDICATIONS:  At the time of admission; aspirin, Humalog coverage,  Apresoline, Norvasc, Crestor, vitamin C, Macrobid, Cymbalta, Cozaar,  Flomax, and Ditropan. ALLERGIES TO MEDICATIONS:  LIPITOR, NEURONTIN, and SULFA. REVIEW OF SYSTEMS:  Unable to be obtained at this time due to her  confusion.     PHYSICAL EXAMINATION:  VITAL SIGNS:  Height 5 feet and 5 inches, 162 pounds. Blood pressure is  113/60, heart rate 80, respirations 20, afebrile. HEENT:  The patient is normocephalic. Pupils are reactive. Sclerae are  clear. Throat shows no coating of the mucous membranes. NECK:  Supple. No JVD or adenopathy. No bruits. CARDIOVASCULAR:  Heart is regular with a 1/6 systolic murmur. ABDOMEN:  Soft. No guarding or rigidity. No visceromegaly. EXTREMITIES:  Lower limb showed no edema. Skin is warm and dry. IMPRESSION:  1. Hyponatremia, etiology uncertain at this time. No evidence of  diuretics, nausea, vomiting and diarrhea. 2.  CKD IV/V with heavy proteinuria. 3.  Diabetes mellitus type 2 for 50 years. 4.  Hypertension. 5.  Encephalopathy due to hypoglycemia. 6.  Recent report of pneumonia on Zosyn. PLAN:  The patient will be given 3% saline bolus in addition to a  continuous dextrose infusion as a result of hypoglycemia. Avoid  nephrotoxic substances, contrast dye/nonsteroidal anti-inflammatory and  control blood pressure and blood sugars.         Claudia Moritz, DO    D: 10/08/2023 9:16:19       T: 10/08/2023 9:20:58     [id number]  Job#: [id number]     Doc#: [id number]    CC:

## 2023-10-08 NOTE — CONSULTS
Renal consult diciated    AC on CKD-4  Hypotension  Pneumonia Right sided   Hypoglycemia recurrent  DM type-2 50 years  Nephrotic syndrome  Hx Hypertension 50 years years      Plan continuous dextrose infusion d/t hypoglycemia  Hypertonic saline   repeat labs at 1 PM today  Continue antibiotic

## 2023-10-08 NOTE — CONSULTS
Good afternoon. We spoke with patient's daughter who has requested to meet with hospice this afternoon from [phone] at 1W in patient's room. A hospice nurse will meet with family to discuss hospice philosophy and services this afternoon. We will follow up with any updates once we have met with the family. Thank you for your kindness.

## 2023-10-08 NOTE — CONSULTS
We received the hospice referral for your patient and will reach out to her family at this time to discuss potential meeting to discuss hospice philosophy and services. We will follow up with a plan or any updates once we have spoken to the family. Thank you for your kindness.

## 2023-10-08 NOTE — ED NOTES
Bladder scan done- showing 215  Ordered to straight cath by [facility] [place], [place]  Straight cath done and put out 200 ml  Urine sent to lab     Cleve Romero, RN  10/07/23 2427

## 2023-10-08 NOTE — FLOWSHEET NOTE
2220-  Pt arrived on unit from ED, transferred via slide from cart to bed. Awake, unable to follow commands and speech is garbled/ incoherent. VSS, PERRLA, tremors noted in BUE. Lungs clear and diminished, S1S2 possible murmur noted, NSR per tele HR 90's. Admission complete with help from DAVID [facility], Daughter/ KASSY MCCALL [facility]. 2230-  RICK Ramirez at bedside to assess pt. Orders placed. 18-  Dr. Xavier Wright at bedside to assess pt. Orders placed. 0030-  Pt increasingly lethargic, responding to pain only. Blood glucose checked and found to be 54. 125ml 10% Dextrose bolus given per protocol. 0045-  Pt blood glucose 104, more easily aroused. 0230-  Pt blood glucose 60. 125 ml 10% Dextrose given per protocol. 0330-  Pt blood glucose 66. RICK Kaiser notified via perfect serve. 0345-  10% Dextrose continuous infusion started per protocol. 0415-  Pt blood glucose 65.     0530-  10% Dextrose infusion stopper per protocol BG-85    0073-0068  Pt found in bed grunting and non responsive to voice or pain. Rapid responds called. See RN Loga nursing note for Code Rapid details. Pt blood glucose 40, 1 amp D50 ordered and given. Recheck shows . Pt responding to commands and answering questions. Dr. Lashonda Ferris ordered Q30 min blood glucose checks . Will continue to monitor.          SE  Electronically signed by Charito Raman RN on 10/8/2023 at 7:00 AM     .

## 2023-10-09 PROBLEM — J18.9 PNEUMONIA OF BOTH LOWER LOBES DUE TO INFECTIOUS ORGANISM: Status: ACTIVE | Noted: 2023-10-09

## 2023-10-09 PROBLEM — E11.65 POORLY CONTROLLED DIABETES MELLITUS (HCC): Status: ACTIVE | Noted: 2023-10-09

## 2023-10-09 LAB
ALBUMIN SERPL-MCNC: 2.7 G/DL (ref 3.5–4.6)
ALP SERPL-CCNC: 66 U/L (ref 40–130)
ALT SERPL-CCNC: 11 U/L (ref 0–33)
ANION GAP SERPL CALCULATED.3IONS-SCNC: 16 MEQ/L (ref 9–15)
ANION GAP SERPL CALCULATED.3IONS-SCNC: 17 MEQ/L (ref 9–15)
ANION GAP SERPL CALCULATED.3IONS-SCNC: 18 MEQ/L (ref 9–15)
ANION GAP SERPL CALCULATED.3IONS-SCNC: 19 MEQ/L (ref 9–15)
AST SERPL-CCNC: 19 U/L (ref 0–35)
BASOPHILS # BLD: 0 K/UL (ref 0–0.2)
BASOPHILS NFR BLD: 0.1 %
BILIRUB SERPL-MCNC: <0.2 MG/DL (ref 0.2–0.7)
BUN SERPL-MCNC: 47 MG/DL (ref 8–23)
BUN SERPL-MCNC: 50 MG/DL (ref 8–23)
BUN SERPL-MCNC: 51 MG/DL (ref 8–23)
BUN SERPL-MCNC: 52 MG/DL (ref 8–23)
CALCIUM SERPL-MCNC: 7.9 MG/DL (ref 8.5–9.9)
CALCIUM SERPL-MCNC: 7.9 MG/DL (ref 8.5–9.9)
CALCIUM SERPL-MCNC: 8.1 MG/DL (ref 8.5–9.9)
CALCIUM SERPL-MCNC: 8.1 MG/DL (ref 8.5–9.9)
CHLORIDE SERPL-SCNC: 86 MEQ/L (ref 95–107)
CHLORIDE SERPL-SCNC: 87 MEQ/L (ref 95–107)
CHLORIDE SERPL-SCNC: 87 MEQ/L (ref 95–107)
CHLORIDE SERPL-SCNC: 90 MEQ/L (ref 95–107)
CO2 SERPL-SCNC: 12 MEQ/L (ref 20–31)
CO2 SERPL-SCNC: 15 MEQ/L (ref 20–31)
CO2 SERPL-SCNC: 15 MEQ/L (ref 20–31)
CO2 SERPL-SCNC: 16 MEQ/L (ref 20–31)
CREAT SERPL-MCNC: 3.37 MG/DL (ref 0.5–0.9)
CREAT SERPL-MCNC: 3.56 MG/DL (ref 0.5–0.9)
CREAT SERPL-MCNC: 3.57 MG/DL (ref 0.5–0.9)
CREAT SERPL-MCNC: 3.6 MG/DL (ref 0.5–0.9)
EOSINOPHIL # BLD: 0 K/UL (ref 0–0.7)
EOSINOPHIL NFR BLD: 0 %
ERYTHROCYTE [DISTWIDTH] IN BLOOD BY AUTOMATED COUNT: 12.9 % (ref 11.5–14.5)
GLOBULIN SER CALC-MCNC: 3 G/DL (ref 2.3–3.5)
GLUCOSE BLD-MCNC: 182 MG/DL (ref 70–99)
GLUCOSE BLD-MCNC: 299 MG/DL (ref 70–99)
GLUCOSE BLD-MCNC: 369 MG/DL (ref 70–99)
GLUCOSE BLD-MCNC: 372 MG/DL (ref 70–99)
GLUCOSE BLD-MCNC: 387 MG/DL (ref 70–99)
GLUCOSE BLD-MCNC: 391 MG/DL (ref 70–99)
GLUCOSE BLD-MCNC: 415 MG/DL (ref 70–99)
GLUCOSE BLD-MCNC: 511 MG/DL (ref 70–99)
GLUCOSE BLD-MCNC: 99 MG/DL (ref 70–99)
GLUCOSE SERPL-MCNC: 105 MG/DL (ref 70–99)
GLUCOSE SERPL-MCNC: 264 MG/DL (ref 70–99)
GLUCOSE SERPL-MCNC: 383 MG/DL (ref 70–99)
GLUCOSE SERPL-MCNC: 451 MG/DL (ref 70–99)
HCT VFR BLD AUTO: 27 % (ref 37–47)
HGB BLD-MCNC: 9 G/DL (ref 12–16)
LYMPHOCYTES # BLD: 0.4 K/UL (ref 1–4.8)
LYMPHOCYTES NFR BLD: 3.6 %
MAGNESIUM SERPL-MCNC: 2 MG/DL (ref 1.7–2.4)
MCH RBC QN AUTO: 29.6 PG (ref 27–31.3)
MCHC RBC AUTO-ENTMCNC: 33.3 % (ref 33–37)
MCV RBC AUTO: 88.8 FL (ref 79.4–94.8)
MONOCYTES # BLD: 0.1 K/UL (ref 0.2–0.8)
MONOCYTES NFR BLD: 0.6 %
NEUTROPHILS # BLD: 10.8 K/UL (ref 1.4–6.5)
NEUTS SEG NFR BLD: 95.3 %
PERFORMED ON: ABNORMAL
PERFORMED ON: NORMAL
PLATELET # BLD AUTO: 251 K/UL (ref 130–400)
PLATELET BLD QL SMEAR: ADEQUATE
POTASSIUM SERPL-SCNC: 4.7 MEQ/L (ref 3.4–4.9)
POTASSIUM SERPL-SCNC: 4.7 MEQ/L (ref 3.4–4.9)
POTASSIUM SERPL-SCNC: 4.9 MEQ/L (ref 3.4–4.9)
POTASSIUM SERPL-SCNC: 5.2 MEQ/L (ref 3.4–4.9)
PROT SERPL-MCNC: 5.7 G/DL (ref 6.3–8)
RBC # BLD AUTO: 3.04 M/UL (ref 4.2–5.4)
REASON FOR REJECTION: NORMAL
REASON FOR REJECTION: NORMAL
REJECTED TEST: NORMAL
REJECTED TEST: NORMAL
SODIUM SERPL-SCNC: 115 MEQ/L (ref 135–144)
SODIUM SERPL-SCNC: 120 MEQ/L (ref 135–144)
SODIUM SERPL-SCNC: 121 MEQ/L (ref 135–144)
SODIUM SERPL-SCNC: 122 MEQ/L (ref 135–144)
WBC # BLD AUTO: 11.3 K/UL (ref 4.8–10.8)

## 2023-10-09 PROCEDURE — 99222 1ST HOSP IP/OBS MODERATE 55: CPT | Performed by: INTERNAL MEDICINE

## 2023-10-09 PROCEDURE — 6360000002 HC RX W HCPCS: Performed by: INTERNAL MEDICINE

## 2023-10-09 PROCEDURE — 6370000000 HC RX 637 (ALT 250 FOR IP): Performed by: INTERNAL MEDICINE

## 2023-10-09 PROCEDURE — 97166 OT EVAL MOD COMPLEX 45 MIN: CPT

## 2023-10-09 PROCEDURE — 2700000000 HC OXYGEN THERAPY PER DAY

## 2023-10-09 PROCEDURE — 6360000002 HC RX W HCPCS: Performed by: NURSE PRACTITIONER

## 2023-10-09 PROCEDURE — 1210000000 HC MED SURG R&B

## 2023-10-09 PROCEDURE — APPSS45 APP SPLIT SHARED TIME 31-45 MINUTES: Performed by: NURSE PRACTITIONER

## 2023-10-09 PROCEDURE — 6370000000 HC RX 637 (ALT 250 FOR IP): Performed by: NURSE PRACTITIONER

## 2023-10-09 PROCEDURE — 85025 COMPLETE CBC W/AUTO DIFF WBC: CPT

## 2023-10-09 PROCEDURE — 2580000003 HC RX 258: Performed by: NURSE PRACTITIONER

## 2023-10-09 PROCEDURE — 99222 1ST HOSP IP/OBS MODERATE 55: CPT | Performed by: PSYCHIATRY & NEUROLOGY

## 2023-10-09 PROCEDURE — 80053 COMPREHEN METABOLIC PANEL: CPT

## 2023-10-09 PROCEDURE — 2580000003 HC RX 258: Performed by: INTERNAL MEDICINE

## 2023-10-09 PROCEDURE — 95816 EEG AWAKE AND DROWSY: CPT

## 2023-10-09 PROCEDURE — 36415 COLL VENOUS BLD VENIPUNCTURE: CPT

## 2023-10-09 PROCEDURE — 83735 ASSAY OF MAGNESIUM: CPT

## 2023-10-09 RX ORDER — TOLVAPTAN 15 MG/1
15 TABLET ORAL ONCE
Status: COMPLETED | OUTPATIENT
Start: 2023-10-09 | End: 2023-10-09

## 2023-10-09 RX ORDER — INSULIN LISPRO 100 [IU]/ML
4 INJECTION, SOLUTION INTRAVENOUS; SUBCUTANEOUS ONCE
Status: COMPLETED | OUTPATIENT
Start: 2023-10-09 | End: 2023-10-09

## 2023-10-09 RX ORDER — INSULIN LISPRO 100 [IU]/ML
3 INJECTION, SOLUTION INTRAVENOUS; SUBCUTANEOUS
Status: DISCONTINUED | OUTPATIENT
Start: 2023-10-09 | End: 2023-10-14 | Stop reason: HOSPADM

## 2023-10-09 RX ORDER — INSULIN GLARGINE 100 [IU]/ML
12 INJECTION, SOLUTION SUBCUTANEOUS 2 TIMES DAILY
Status: DISCONTINUED | OUTPATIENT
Start: 2023-10-09 | End: 2023-10-10

## 2023-10-09 RX ORDER — 3% SODIUM CHLORIDE 3 G/100ML
50 INJECTION, SOLUTION INTRAVENOUS CONTINUOUS
Status: DISPENSED | OUTPATIENT
Start: 2023-10-09 | End: 2023-10-09

## 2023-10-09 RX ADMIN — HEPARIN SODIUM 5000 UNITS: 5000 INJECTION INTRAVENOUS; SUBCUTANEOUS at 22:40

## 2023-10-09 RX ADMIN — INSULIN GLARGINE 12 UNITS: 100 INJECTION, SOLUTION SUBCUTANEOUS at 11:39

## 2023-10-09 RX ADMIN — HEPARIN SODIUM 5000 UNITS: 5000 INJECTION INTRAVENOUS; SUBCUTANEOUS at 16:30

## 2023-10-09 RX ADMIN — INSULIN LISPRO 8 UNITS: 100 INJECTION, SOLUTION INTRAVENOUS; SUBCUTANEOUS at 11:39

## 2023-10-09 RX ADMIN — HEPARIN SODIUM 5000 UNITS: 5000 INJECTION INTRAVENOUS; SUBCUTANEOUS at 08:30

## 2023-10-09 RX ADMIN — INSULIN LISPRO 4 UNITS: 100 INJECTION, SOLUTION INTRAVENOUS; SUBCUTANEOUS at 05:37

## 2023-10-09 RX ADMIN — ROSUVASTATIN CALCIUM 10 MG: 10 TABLET, FILM COATED ORAL at 22:39

## 2023-10-09 RX ADMIN — Medication 10 ML: at 09:15

## 2023-10-09 RX ADMIN — INSULIN LISPRO 3 UNITS: 100 INJECTION, SOLUTION INTRAVENOUS; SUBCUTANEOUS at 17:02

## 2023-10-09 RX ADMIN — Medication 15 MG: at 17:16

## 2023-10-09 RX ADMIN — INSULIN LISPRO 3 UNITS: 100 INJECTION, SOLUTION INTRAVENOUS; SUBCUTANEOUS at 11:40

## 2023-10-09 RX ADMIN — SODIUM CHLORIDE 50 ML/HR: 3 INJECTION, SOLUTION INTRAVENOUS at 17:17

## 2023-10-09 RX ADMIN — ASPIRIN 81 MG CHEWABLE TABLET 81 MG: 81 TABLET CHEWABLE at 09:00

## 2023-10-09 RX ADMIN — PIPERACILLIN AND TAZOBACTAM 3375 MG: 3; .375 INJECTION, POWDER, LYOPHILIZED, FOR SOLUTION INTRAVENOUS at 12:20

## 2023-10-09 RX ADMIN — METHYLPREDNISOLONE SODIUM SUCCINATE 40 MG: 40 INJECTION, POWDER, FOR SOLUTION INTRAMUSCULAR; INTRAVENOUS at 03:26

## 2023-10-09 RX ADMIN — SODIUM CHLORIDE 50 ML/HR: 3 INJECTION, SOLUTION INTRAVENOUS at 11:40

## 2023-10-09 RX ADMIN — PIPERACILLIN AND TAZOBACTAM 3375 MG: 3; .375 INJECTION, POWDER, LYOPHILIZED, FOR SOLUTION INTRAVENOUS at 00:15

## 2023-10-09 RX ADMIN — HEPARIN SODIUM 5000 UNITS: 5000 INJECTION INTRAVENOUS; SUBCUTANEOUS at 00:12

## 2023-10-09 RX ADMIN — INSULIN LISPRO 4 UNITS: 100 INJECTION, SOLUTION INTRAVENOUS; SUBCUTANEOUS at 17:01

## 2023-10-09 ASSESSMENT — ENCOUNTER SYMPTOMS
VOMITING: 0
COUGH: 1
WHEEZING: 0
SHORTNESS OF BREATH: 0
NAUSEA: 0
COUGH: 0
COLOR CHANGE: 0
DIARRHEA: 0
TROUBLE SWALLOWING: 1

## 2023-10-09 ASSESSMENT — PAIN SCALES - WONG BAKER: WONGBAKER_NUMERICALRESPONSE: 0

## 2023-10-09 NOTE — CONSULTS
[facility] Neurology Consult Note  Name: Joaquín Jackson  Age: 80 y.o. Gender: female  CodeStatus: DNR-CC  Allergies: Shellfish Allergy  Atorvastatin  Gabapentin  Sulfa Antibiotics    Chief Complaint:Altered Mental Status (Last known well yesterday)    Primary Care Provider: Chanelle Hameed MD  InpatientTreatment Team: Treatment Team: Attending Provider: Sohan Alicea MD; Consulting Physician: Marky Hill DO; Registered Nurse: Leena Oconnell, RN; Hospitalist: Sohan Alicea MD; : Florence Evangelista, RN; : Clovis Falcon, RN; Registered Nurse: Kalee Reyes RN; Consulting Physician: Gerard Rebolledo MD; Registered Nurse: Claudia Marcus RN; Utilization Reviewer: Emma Leal RN  Admission Date: 10/7/2023      HPI   Consulting provider: Dr Sohan Alicea for change in [address] [facility]  Pt seen and examined for neurology consult. Patient is a 80-year-old female with past medical history of diabetes mellitus type 2, CKD stage III, piriformis syndrome, neuropathy, hypertension, hyperlipidemia who presented to [facility] [place] [facility] [place] emergency room from [facility] due to somnolence. Recently diagnosed with pneumonia and was on Zosyn. Becoming more lethargic. Vital signs on arrival 137/49, 81, 16, 98.8, 96%. EKG showed normal sinus rhythm at 84 bpm.  CT of the chest showed large bilateral pleural effusions right greater than left consolidative infiltrate at the lung base on the right. CT of the head was negative for any acute intracranial findings. Respiratory panel was negative. White blood cell count was elevated at 12.2. Hemoglobin low at 9.9 which is slightly lower than baseline. Sodium was low at 120. Creatinine elevated at 3.15. BNP elevated at 1122. Procalcitonin elevated at 0.26. Urinalysis negative for UTI. Patient had recently been discharged from [facility] on September 27 2023 due to left cerebral TIA and underlying dementia.   MRI at that time did not show any acute

## 2023-10-09 NOTE — PROGRESS NOTES
[facility] OCCUPATIONAL THERAPY EVALUATION - ACUTE     NAME: Kaylene Gerardo  : 3/6/1933 (80 y.o.)  MRN: [id number]  CODE STATUS: DNR-CC  Room: W191/W191-01    Date of Service: 10/9/2023    Patient Diagnosis(es): Hyponatremia [E87.1]  Retention of urine [R33.9]  Elevated troponin [R79.89]  AC (acute kidney injury) ([address]) [N17.9]  Pneumonia of both lower lobes due to infectious organism [J18.9]  New onset of congestive heart failure ([address]) [I50.9]   Patient Active Problem List    Diagnosis Date Noted    Poorly controlled diabetes mellitus ([facility] [address] ) 10/09/2023    Pneumonia of both lower lobes due to infectious organism 10/09/2023    Hyponatremia 10/07/2023    History of recent fall 2023    Cardiac arrhythmia 2023    AC (acute kidney injury) ([address]) 2023    Neurogenic bladder 2023    Advanced care planning/counseling discussion 2023    Goals of care, counseling/discussion 2023    Palliative care encounter 2023    Altered mental status 2023    Impaired mobility and activities of daily living 2023    Acute CVA (cerebrovascular accident) ([address] 2023    Impairment of balance 2023    Bursitis of hip 2022    Palpitations 2022    Piriformis syndrome 2022    Vascular disorder of extremity ([address] 2020    Benign essential hypertension 2019    Dysphagia, unspecified 2018    Gastro-esophageal reflux disease with esophagitis 2018    Backache 2009    Urine retention 2009    Urinary incontinence 2009    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        Past Medical History:   Diagnosis Date    Back pain 2009    Balance problems 2020    Constipation 2022    Dysphagia 2022    Falls 2022    Hyperlipidemia     Hypertension     Incomplete bladder emptying 2009    Neuropathy     Piriformis syndrome 2022    Stage 3 chronic kidney disease ([address]) 2022    Type 2 diabetes mellitus Patient Education  Education Given To: Patient  Education Provided: Role of Therapy;Plan of Care;IADL Safety;Precautions;Transfer Training;ADL Adaptive Strategies; Fall Prevention Strategies  Education Method: Demonstration;Verbal  Education Outcome: Continued education needed    OT Follow Up:   OT D/C RECOMMENDATIONS  REQUIRES OT FOLLOW-UP: Yes     Assessment/Discharge Disposition:  Assessment: 79 y/o female who presents from SNF. Pt with medical decline in above deficits. Sifnificant functional limitations d/t decreased cognition and balance.  Rec conitnued OT at this time  Performance deficits / Impairments: Decreased functional mobility , Decreased endurance, Decreased ADL status, Decreased balance, Decreased strength, Decreased safe awareness, Decreased cognition, Decreased fine motor control  Prognosis: Fair  Discharge Recommendations: Continue to assess pending progress  Decision Making: Medium Complexity  History: complex chart  Exam: 8 perf deficits  Assistance / Modification: ModA    [facility] ([facility]) Self care Score   How much help is needed for putting on and taking off regular lower body clothing?: A Lot  How much help is needed for bathing (which includes washing, rinsing, drying)?: A Lot  How much help is needed for toileting (which includes using toilet, bedpan, or urinal)?: A Lot  How much help is needed for putting on and taking off regular upper body clothing?: A Little  How much help is needed for taking care of personal grooming?: A Little  How much help for eating meals?: None  AM-Merged with [facility] Inpatient Daily Activity Raw Score: 16  AM-PAC Inpatient ADL T-Scale Score : 35.96  ADL Inpatient CMS 0-100% Score: 53.32    Therapy key for assistance levels -   Independent/Mod I = Pt. is able to perform task with no assistance but may require a device   Stand by assistance = Pt. does not perform task at an independent level but does not need physical assistance, requires verbal cues  Minimal, Moderate, Maximal

## 2023-10-09 NOTE — PROGRESS NOTES
Nephrology Progress Note  Poor prognosis  Assessment:  Hyponatremia lab pending this am  CKD-4  (nephrotic DM  CVA  Anemia  DM type-2              Plan: await further orders till labs from this am back on chart   consider hospice    Patient Active Problem List:     Acute CVA (cerebrovascular accident) ([address])     Altered mental status     Dysphagia, unspecified     Congenital cystic kidney disease     Bursitis of hip     Benign essential hypertension     Backache     Gastro-esophageal reflux disease with esophagitis     Urine retention     Impairment of balance     Palpitations     Piriformis syndrome     Urinary incontinence     Vascular disorder of extremity (HCC)     Impaired mobility and activities of daily living     Advanced care planning/counseling discussion     Goals of care, counseling/discussion     Palliative care encounter     Neurogenic bladder     History of recent fall     Cardiac arrhythmia     AC (acute kidney injury) ([address])     Hyponatremia      Subjective:  Admit Date: 10/7/2023    Interval History: same     Medications:  Scheduled Meds:   aspirin  81 mg Oral Daily    rosuvastatin  10 mg Oral Nightly    piperacillin-tazobactam  3,375 mg IntraVENous Q12H    sodium chloride flush  5-40 mL IntraVENous 2 times per day    [Held by provider] insulin lispro  0-8 Units SubCUTAneous TID WC    [Held by provider] insulin lispro  0-4 Units SubCUTAneous Nightly    heparin (porcine)  5,000 Units SubCUTAneous 3 times per day     Continuous Infusions:   sodium chloride      dextrose 100 mL/hr at 10/08/23 0347       CBC:   Recent Labs     10/07/23  1936 10/08/23  0251 10/08/23  0325   WBC 12.2*  --  10.4   HGB 9.9* 8.8* 9.4*     --  243     CMP:    Recent Labs     10/08/23  0325 10/08/23  1055 10/08/23  1435   * 122*  123*  122*  122* 123*   K 4.4 4.3  4.3  4.3  4.3 4.3   CL 91* 89*  89*  89*  89* 90*   CO2 19* 18*  19*  18*  17* 18*   BUN 45* 45*  46*  46*  46* 45*   CREATININE 3.35*

## 2023-10-09 NOTE — PROGRESS NOTES
small vessel ischemic change. There is no acute intracranial hemorrhage, mass effect or midline shift. No abnormal extra-axial fluid collection. The gray-white differentiation is maintained without evidence of an acute infarct. There is no evidence of hydrocephalus. ORBITS: The visualized portion of the orbits demonstrate no acute abnormality. SINUSES: The visualized paranasal sinuses and mastoid air cells demonstrate no acute abnormality. SOFT TISSUES/SKULL:  No acute abnormality of the visualized skull or soft tissues. Generalized atrophy and chronic changes seen within the brain with no acute intracranial abnormality. XR CHEST PORTABLE    Result Date: 10/7/2023  EXAMINATION: ONE XRAY VIEW OF THE CHEST 10/7/2023 7:09 pm COMPARISON: Correlation made with CT from September 19, 2023 HISTORY: ORDERING SYSTEM PROVIDED HISTORY: lethargic TECHNOLOGIST PROVIDED HISTORY: Reason for exam:->lethargic What reading provider will be dictating this exam?->CRC FINDINGS: Opacities are present in mid and lower right lung field silhouetting right hemidiaphragm and right heart border. Additional minimal opacities are present in left lower lung field. The heart is normal in size. No pneumothorax. Opacities are present in lung bases notable on the right which could indicate pneumonia or atelectasis. Short-term follow-up recommended. Assessment//Plan           Hospital Problems             Last Modified POA    * (Principal) Hyponatremia 10/7/2023 Yes    Poorly controlled diabetes mellitus ([address]) 10/9/2023 Yes   AC on CKD  Hyponatremia  right lower PNA  Atelectasis  hyponatremia  Assessment & Plan  10/8: Patient had rapid response for persistent hypotension received glucagon and dextrose improvement of his symptoms and hypoglycemia. Follow-up proinsulin and C-peptide. Hold insulin for now. Spoke with nursing about the care. Monitor sugar closely. Continue IV antibiotics.   Follow renal recommendation for sodium. Palliative care prognosis guarded, TCT 50 min  10/9: Patient was seen evaluated. Spoke with daughter again over the phone. We will get palliative care. Family not ready yet for hospice. They might change their  mind in future if patient does not get better. Monitor sodium closely. Patient is more confused compared to yesterday. Neurology evaluation.   Endocrinology evaluation for uncontrolled diabetes TCT 50 min  Electronically signed by Mina Shaw MD on 10/8/23 at 10:10 AM EDT

## 2023-10-09 NOTE — CONSULTS
[facility] [place], [address] [facility]                                  CONSULTATION    PATIENT NAME: Tamanna Livingston                      :        1933  MED REC NO:   [id number]                            ROOM:  ACCOUNT NO:   [de-identified]                           ADMIT DATE: 10/07/2023  PROVIDER:     Sukhi Merritt MD    CONSULT DATE:  10/09/2023    ENDOCRINE CONSULTATION    REFERRING PROVIDER:  Maribel Cuba MD    REASON FOR CONSULTATION:  Uncontrolled diabetes, recent hypoglycemia. CHIEF COMPLAINT AND HISTORY OF PRESENT ILLNESS:  Obtained through prior  H and P. The patient is very drowsy to get a history. The patient is a  63-year-old female with known history of diabetes admitted to [facility] for altered mental status, was also found to  have hyponatremia and acute kidney injury. Initial blood sugars have  been running very low. The patient was not eating much. Glucose were  staying and 40s to 60 range. She received multiple dextrose glucagon  injections. Blood sugars have gone up high in today between 300-500  range; highest blood sugar was 511. The patient's insulin regimen was  on hold. She just got 11 units of Humalog and 12 units of Lantus;  started on Lantus 12 units twice a day, Humalog 3 units with each meals  and Humalog coverage. Also order a dose of 4 units of Humalog. The  patient is on IV Zosyn for pneumonia. The patient also has severe  hyponatremia getting 3% saline. Chemistries from this morning show some improvement of the sodium from  115-121, potassium 4.9, chloride 87, CO2 was 15, BUN 50, creatinine  3.57. Hemoglobin A1c was 8.5. We did have a cortisol level done also,  it was 19.8. C-peptide was 8.7. TSH was 2.70. Home medications for diabetes included Lantus 20 units daily, Humalog  sliding scale.     PAST MEDICAL HISTORY:  Significant for type 2 diabetes, history

## 2023-10-09 NOTE — PLAN OF CARE
Nutrition Problem #1: Inadequate oral intake  Intervention: Food and/or Nutrient Delivery: Start Oral Nutrition Supplement, Modify Current Diet  Nutritional

## 2023-10-09 NOTE — PROGRESS NOTES
Comprehensive Nutrition Assessment    Type and Reason for Visit:  Initial, Positive Nutrition Screen (+ malnutrition screen)    Nutrition Recommendations/Plan:   Diet changed to Minced and Moist ( to match NH dit), Carb Control 4, 1200 ml FR  Add frozen oral supplement to trays     Malnutrition Assessment:  Malnutrition Status:  Insufficient data (10/09/23 1408)    Context:  Social/Environmental Circumstances     Findings of the 6 clinical characteristics of malnutrition:  Energy Intake:  Mild decrease in energy intake (Comment)  Weight Loss:  Unable to assess     Body Fat Loss:  No significant body fat loss     Muscle Mass Loss:  Unable to assess (due to advacned age)    Fluid Accumulation:  No significant fluid accumulation     Strength:  Not Performed    Nutrition Assessment:    Unable to determine nutritional status PTA, basec on current information. WIll modify diet to that at nursing home and begin an oral nutrition supplement to aid in meeting energy and protein needs    Nutrition Related Findings:    \"admitted for hyponatremia and AC. Eleanor Fears Eleanor Fears presented due to altered mental status and lethargy. .. alert and oriented x2 and complains of shortness of breath and fatigue. recent admit for  TIA and encephalopathy 2 weeks ago. ..hx includes DM, CKD II, Noted 2+ BLE edema, BSE completed 10/8, family to consider hospice if no improvement in a few days, labs :N a= 115, K = 5.2, BUN = 47, creat = 3.37, gluc > 300 Wound Type: None       Current Nutrition Intake & Therapies:    Average Meal Intake: 1-25%  Average Supplements Intake: None Ordered  ADULT DIET; Dysphagia - Soft and Bite Sized; 4 carb choices (60 gm/meal); 1200 ml  ADULT ORAL NUTRITION SUPPLEMENT; Breakfast, Lunch, Dinner; Frozen Oral Supplement    Anthropometric Measures:  Height: 5' 5\" (165.1 cm)  Ideal Body Weight (IBW): 125 lbs (57 kg)    Admission Body Weight: 162 lb (73.5 kg)  Current Body Weight: 162 lb (73.5 kg) (* edema present), 129.6 % IBW.  Weight

## 2023-10-09 NOTE — CARE COORDINATION
LSW spoke with patient's daughter, Radha Stanley, over the phone to discuss the discharge plan. Radha Stanley confirmed they are not ready for hospice at this time and would like to send patient back to a SNF. Patient was previously at [facility] but Radha Stanley said family is unsure if they want her to return. SNF list emailed to Radha Stanley at her request to review other options. PT/OT evals will be needed for pre cert.

## 2023-10-09 NOTE — PROGRESS NOTES
0845: PCA alerted nursing staff that patient pulled out left midline. Direct pressure was used to control bleeding. Patient does have bruising to left arm previous to midline removal by patient. Pressure dressing was applied. Bleeding controlled. Linens changed and patient care completed. Assessment was completed and patient was repositioned so she could eat morning meal. Will continue to monitor. 1230: Patient placed on avasys camera. New IV started for zosyn's. Patient continues to pull at IV. Guaze wrapped. 1624: Critical sodium of 120 taken from lab. Dr. Vanessa vanegas served.

## 2023-10-10 PROBLEM — Z71.89 ENCOUNTER FOR HOSPICE CARE DISCUSSION: Status: ACTIVE | Noted: 2023-10-10

## 2023-10-10 LAB
ALBUMIN SERPL-MCNC: 3.5 G/DL (ref 3.5–4.6)
ALP SERPL-CCNC: 61 U/L (ref 40–130)
ALT SERPL-CCNC: 12 U/L (ref 0–33)
ANION GAP SERPL CALCULATED.3IONS-SCNC: 16 MEQ/L (ref 9–15)
AST SERPL-CCNC: 18 U/L (ref 0–35)
BASOPHILS # BLD: 0 K/UL (ref 0–0.2)
BASOPHILS NFR BLD: 0.1 %
BILIRUB SERPL-MCNC: <0.2 MG/DL (ref 0.2–0.7)
BUN SERPL-MCNC: 53 MG/DL (ref 8–23)
BUN SERPL-MCNC: 53 MG/DL (ref 8–23)
BUN SERPL-MCNC: 56 MG/DL (ref 8–23)
CALCIUM SERPL-MCNC: 7.9 MG/DL (ref 8.5–9.9)
CALCIUM SERPL-MCNC: 8.1 MG/DL (ref 8.5–9.9)
CALCIUM SERPL-MCNC: 8.3 MG/DL (ref 8.5–9.9)
CHLORIDE SERPL-SCNC: 90 MEQ/L (ref 95–107)
CHLORIDE SERPL-SCNC: 91 MEQ/L (ref 95–107)
CHLORIDE SERPL-SCNC: 95 MEQ/L (ref 95–107)
CO2 SERPL-SCNC: 15 MEQ/L (ref 20–31)
CO2 SERPL-SCNC: 16 MEQ/L (ref 20–31)
CO2 SERPL-SCNC: 16 MEQ/L (ref 20–31)
CREAT SERPL-MCNC: 3.54 MG/DL (ref 0.5–0.9)
CREAT SERPL-MCNC: 3.55 MG/DL (ref 0.5–0.9)
CREAT SERPL-MCNC: 3.65 MG/DL (ref 0.5–0.9)
EKG ATRIAL RATE: 83 BPM
EKG P AXIS: 49 DEGREES
EKG P-R INTERVAL: 164 MS
EKG Q-T INTERVAL: 334 MS
EKG QRS DURATION: 54 MS
EKG QTC CALCULATION (BAZETT): 392 MS
EKG R AXIS: 54 DEGREES
EKG T AXIS: 135 DEGREES
EKG VENTRICULAR RATE: 83 BPM
EOSINOPHIL # BLD: 0 K/UL (ref 0–0.7)
EOSINOPHIL NFR BLD: 0 %
ERYTHROCYTE [DISTWIDTH] IN BLOOD BY AUTOMATED COUNT: 12.9 % (ref 11.5–14.5)
GLOBULIN SER CALC-MCNC: 2.4 G/DL (ref 2.3–3.5)
GLUCOSE BLD-MCNC: 148 MG/DL (ref 70–99)
GLUCOSE BLD-MCNC: 154 MG/DL (ref 70–99)
GLUCOSE BLD-MCNC: 179 MG/DL (ref 70–99)
GLUCOSE BLD-MCNC: 213 MG/DL (ref 70–99)
GLUCOSE BLD-MCNC: 60 MG/DL (ref 70–99)
GLUCOSE BLD-MCNC: 73 MG/DL (ref 70–99)
GLUCOSE BLD-MCNC: 89 MG/DL (ref 70–99)
GLUCOSE SERPL-MCNC: 137 MG/DL (ref 70–99)
GLUCOSE SERPL-MCNC: 193 MG/DL (ref 70–99)
GLUCOSE SERPL-MCNC: 45 MG/DL (ref 70–99)
HCT VFR BLD AUTO: 25.4 % (ref 37–47)
HGB BLD-MCNC: 8.7 G/DL (ref 12–16)
LYMPHOCYTES # BLD: 0.4 K/UL (ref 1–4.8)
LYMPHOCYTES NFR BLD: 2.8 %
MAGNESIUM SERPL-MCNC: 2.1 MG/DL (ref 1.7–2.4)
MCH RBC QN AUTO: 29.4 PG (ref 27–31.3)
MCHC RBC AUTO-ENTMCNC: 34.3 % (ref 33–37)
MCV RBC AUTO: 85.8 FL (ref 79.4–94.8)
MONOCYTES # BLD: 0.6 K/UL (ref 0.2–0.8)
MONOCYTES NFR BLD: 4.2 %
NEUTROPHILS # BLD: 13.7 K/UL (ref 1.4–6.5)
NEUTS SEG NFR BLD: 92.3 %
PERFORMED ON: ABNORMAL
PERFORMED ON: NORMAL
PERFORMED ON: NORMAL
PLATELET # BLD AUTO: 271 K/UL (ref 130–400)
POTASSIUM SERPL-SCNC: 4.5 MEQ/L (ref 3.4–4.9)
POTASSIUM SERPL-SCNC: 4.7 MEQ/L (ref 3.4–4.9)
POTASSIUM SERPL-SCNC: 4.8 MEQ/L (ref 3.4–4.9)
POTASSIUM SERPL-SCNC: 4.8 MEQ/L (ref 3.4–4.9)
PROT SERPL-MCNC: 5.9 G/DL (ref 6.3–8)
RBC # BLD AUTO: 2.96 M/UL (ref 4.2–5.4)
SODIUM SERPL-SCNC: 122 MEQ/L (ref 135–144)
SODIUM SERPL-SCNC: 123 MEQ/L (ref 135–144)
SODIUM SERPL-SCNC: 126 MEQ/L (ref 135–144)
WBC # BLD AUTO: 14.9 K/UL (ref 4.8–10.8)

## 2023-10-10 PROCEDURE — 1210000000 HC MED SURG R&B

## 2023-10-10 PROCEDURE — 93010 ELECTROCARDIOGRAM REPORT: CPT | Performed by: INTERNAL MEDICINE

## 2023-10-10 PROCEDURE — 80053 COMPREHEN METABOLIC PANEL: CPT

## 2023-10-10 PROCEDURE — 85025 COMPLETE CBC W/AUTO DIFF WBC: CPT

## 2023-10-10 PROCEDURE — 83735 ASSAY OF MAGNESIUM: CPT

## 2023-10-10 PROCEDURE — 92526 ORAL FUNCTION THERAPY: CPT

## 2023-10-10 PROCEDURE — 99232 SBSQ HOSP IP/OBS MODERATE 35: CPT | Performed by: INTERNAL MEDICINE

## 2023-10-10 PROCEDURE — 6360000002 HC RX W HCPCS: Performed by: INTERNAL MEDICINE

## 2023-10-10 PROCEDURE — 6370000000 HC RX 637 (ALT 250 FOR IP): Performed by: INTERNAL MEDICINE

## 2023-10-10 PROCEDURE — 99221 1ST HOSP IP/OBS SF/LOW 40: CPT | Performed by: PHYSICIAN ASSISTANT

## 2023-10-10 PROCEDURE — APPSS15 APP SPLIT SHARED TIME 0-15 MINUTES: Performed by: NURSE PRACTITIONER

## 2023-10-10 PROCEDURE — 99232 SBSQ HOSP IP/OBS MODERATE 35: CPT | Performed by: PSYCHIATRY & NEUROLOGY

## 2023-10-10 PROCEDURE — 6370000000 HC RX 637 (ALT 250 FOR IP): Performed by: NURSE PRACTITIONER

## 2023-10-10 PROCEDURE — 2580000003 HC RX 258: Performed by: NURSE PRACTITIONER

## 2023-10-10 PROCEDURE — 2580000003 HC RX 258: Performed by: INTERNAL MEDICINE

## 2023-10-10 PROCEDURE — 6360000002 HC RX W HCPCS: Performed by: NURSE PRACTITIONER

## 2023-10-10 PROCEDURE — 36415 COLL VENOUS BLD VENIPUNCTURE: CPT

## 2023-10-10 PROCEDURE — 99222 1ST HOSP IP/OBS MODERATE 55: CPT | Performed by: NURSE PRACTITIONER

## 2023-10-10 RX ORDER — INSULIN LISPRO 100 [IU]/ML
0-4 INJECTION, SOLUTION INTRAVENOUS; SUBCUTANEOUS
Status: DISCONTINUED | OUTPATIENT
Start: 2023-10-10 | End: 2023-10-14 | Stop reason: HOSPADM

## 2023-10-10 RX ORDER — INSULIN LISPRO 100 [IU]/ML
0-4 INJECTION, SOLUTION INTRAVENOUS; SUBCUTANEOUS NIGHTLY
Status: DISCONTINUED | OUTPATIENT
Start: 2023-10-10 | End: 2023-10-14 | Stop reason: HOSPADM

## 2023-10-10 RX ORDER — INSULIN GLARGINE 100 [IU]/ML
8 INJECTION, SOLUTION SUBCUTANEOUS 2 TIMES DAILY
Status: DISCONTINUED | OUTPATIENT
Start: 2023-10-10 | End: 2023-10-14 | Stop reason: HOSPADM

## 2023-10-10 RX ORDER — 3% SODIUM CHLORIDE 3 G/100ML
50 INJECTION, SOLUTION INTRAVENOUS CONTINUOUS
Status: DISPENSED | OUTPATIENT
Start: 2023-10-10 | End: 2023-10-10

## 2023-10-10 RX ADMIN — Medication 10 ML: at 10:21

## 2023-10-10 RX ADMIN — Medication 10 ML: at 21:55

## 2023-10-10 RX ADMIN — SODIUM CHLORIDE 50 ML/HR: 3 INJECTION, SOLUTION INTRAVENOUS at 10:14

## 2023-10-10 RX ADMIN — HEPARIN SODIUM 5000 UNITS: 5000 INJECTION INTRAVENOUS; SUBCUTANEOUS at 17:00

## 2023-10-10 RX ADMIN — ROSUVASTATIN CALCIUM 10 MG: 10 TABLET, FILM COATED ORAL at 21:57

## 2023-10-10 RX ADMIN — HEPARIN SODIUM 5000 UNITS: 5000 INJECTION INTRAVENOUS; SUBCUTANEOUS at 09:00

## 2023-10-10 RX ADMIN — PIPERACILLIN AND TAZOBACTAM 3375 MG: 3; .375 INJECTION, POWDER, LYOPHILIZED, FOR SOLUTION INTRAVENOUS at 00:40

## 2023-10-10 RX ADMIN — Medication 10 ML: at 00:36

## 2023-10-10 RX ADMIN — ASPIRIN 81 MG CHEWABLE TABLET 81 MG: 81 TABLET CHEWABLE at 10:19

## 2023-10-10 RX ADMIN — Medication 16 G: at 03:41

## 2023-10-10 RX ADMIN — INSULIN LISPRO 1 UNITS: 100 INJECTION, SOLUTION INTRAVENOUS; SUBCUTANEOUS at 19:23

## 2023-10-10 RX ADMIN — PIPERACILLIN AND TAZOBACTAM 3375 MG: 3; .375 INJECTION, POWDER, LYOPHILIZED, FOR SOLUTION INTRAVENOUS at 12:35

## 2023-10-10 RX ADMIN — INSULIN LISPRO 3 UNITS: 100 INJECTION, SOLUTION INTRAVENOUS; SUBCUTANEOUS at 12:34

## 2023-10-10 RX ADMIN — INSULIN LISPRO 3 UNITS: 100 INJECTION, SOLUTION INTRAVENOUS; SUBCUTANEOUS at 19:23

## 2023-10-10 ASSESSMENT — PAIN SCALES - GENERAL: PAINLEVEL_OUTOF10: 0

## 2023-10-10 ASSESSMENT — ENCOUNTER SYMPTOMS
WHEEZING: 0
TROUBLE SWALLOWING: 0
COUGH: 0
DIARRHEA: 0
SHORTNESS OF BREATH: 0
COLOR CHANGE: 0
COUGH: 1
NAUSEA: 0
VOMITING: 0

## 2023-10-10 ASSESSMENT — VISUAL ACUITY: OU: 1

## 2023-10-10 NOTE — PLAN OF CARE
Problem: Discharge Planning  Goal: Discharge to home or other facility with appropriate resources  Outcome: Progressing  Flowsheets (Taken 10/9/2023 2230)  Discharge to home or other facility with appropriate resources:   Identify barriers to discharge with patient and caregiver   Arrange for needed discharge resources and transportation as appropriate     Problem: Skin/Tissue Integrity  Goal: Absence of new skin breakdown  Description: 1. Monitor for areas of redness and/or skin breakdown  2. Assess vascular access sites hourly  3. Every 4-6 hours minimum:  Change oxygen saturation probe site  4. Every 4-6 hours:  If on nasal continuous positive airway pressure, respiratory therapy assess nares and determine need for appliance change or resting period. Outcome: Progressing     Problem: Safety - Adult  Goal: Free from fall injury  Outcome: Progressing     Problem: ABCDS Injury Assessment  Goal: Absence of physical injury  Outcome: Progressing     Problem: Chronic Conditions and Co-morbidities  Goal: Patient's chronic conditions and co-morbidity symptoms are monitored and maintained or improved  Outcome: Progressing  Flowsheets (Taken 10/9/2023 2230)  Care Plan - Patient's Chronic Conditions and Co-Morbidity Symptoms are Monitored and Maintained or Improved:   Monitor and assess patient's chronic conditions and comorbid symptoms for stability, deterioration, or improvement   Collaborate with multidisciplinary team to address chronic and comorbid conditions and prevent exacerbation or deterioration   Problem: Confusion  Goal: Confusion, delirium, dementia, or psychosis is improved or at baseline  Description: INTERVENTIONS:  1. Assess for possible contributors to thought disturbance, including medications, impaired vision or hearing, underlying metabolic abnormalities, dehydration, psychiatric diagnoses, and notify attending LIP  2. [place] high risk fall precautions, as indicated  3.  Provide frequent short

## 2023-10-10 NOTE — PROGRESS NOTES
@[id number] messaged Heath Acosta NP to notify that repeat sodium level that was just drawn this evening was 122, and inquired whether IVF should be ordered. Awaiting response. Radha SMART, ROSALIND, [facility]  10/9/2023 9:37 PM      @9492 Received call from lab that patient's glucose from recent BMP draw was 39. Performed finger stick glucose check and result was 60. Gave prn glucose tablets as charted in MAR. Also gave patient non-diet soda to drink. Heath HANNON was on unit, and informed him of this. (2100 dose of Lantus had been held per parameter. )    --Patient's Na level from this BMP is 123. Radha SMART, ROSALIND, [facility]  10/10/2023 3:48 AM      @4765 Repeat bedside finger glucose check is 73. Patient states she feels \"better. \"  Radha SMART RN, [facility]  10/10/2023 4:00 AM

## 2023-10-10 NOTE — CARE COORDINATION
Voicemail left for patient's daughter to follow up on SNF choice. Call back received from daughter, Edy Biswas, stating her first choice would be [facility] and [address] [place] [address]. LSW advised Edy Biswas that [address] [place] [place] [address] does not accept patient's insurance. She said she will review list again for second option. Referral given to [facility] to review. Call received from Franci Aiken at [facility]. She stated they do not have an open bed at this time but will call if any become available this week. VM left for patient's daughter to inform her of this.

## 2023-10-10 NOTE — PROGRESS NOTES
[facility]  [facility]  Speech Language Pathology   Treatment Note      Alba Craig  3/6/1933  [id number]/[id number]  [x]   confirmed      Date: 10/10/2023    Hyponatremia [E87.1]  Retention of urine [R33.9]  Elevated troponin [R79.89]  AC (acute kidney injury) ([address]) [N17.9]  Pneumonia of both lower lobes due to infectious organism [J18.9]  New onset of congestive heart failure ([address]) [I50.9]    Restrictions/Precautions: Fall Risk  Position Activity Restriction  Other position/activity restrictions: [employer]    Weight - Scale: 162 lb 4.8 oz (73.6 kg)     ADULT ORAL NUTRITION SUPPLEMENT; Breakfast, Lunch, Dinner; Frozen Oral Supplement  ADULT DIET; Dysphagia - Minced and Moist; 4 carb choices (60 gm/meal); 1200 ml    SpO2: 95 % (10/10/23 1230)  O2 Flow Rate (L/min): 2 L/min (10/08/23 1925)  No active isolations      Subjective:  Alert, Distractible, and Confused      Pt talking to self upon arrival.    Interventions used this date:  Dysphagia Treatment      Objective/Assessment:  Patient progressing towards goals:  Goal 1: Pt will complete lingual exercises x10/each to promote increased OM strength/coordination and improve oral phase of swallow with cues as needed. Not addressed secondary to confusion  Goal 2: Pt will tolerate soft and bite sized diet with thin liquids with adequate oral clearance with no overt s/s of difficulty or aspiration. Per chart review, pt was downgraded to minced and moist diet 10/9 by dietician. Pt in agreement to have pudding at bedside table. Min assist for feeding. Pt needed increased time for bolus manipulation and propulsion. Pt declined further trials after 2. No oral residue and no overt s/s of aspiration. Pt took water from cup with anterior spillage right side x 2 secondary to positioning in bed and difficulty feeding self without assist.  Pt had slight change of vocal quality, no coughing or throat clearing. Pt declined further trials after 4. Downgrade goal to tolerating minced and moist diet. Goal 3: Pt will participate in an instrumental procedure to assess oropharyngeal function and determine least restrictive diet as deemed needed by treating SLP and respiratory condition. Not warranted at this time    Rec: continue with minced and moist diet with thin liquids, as tolerated      Treatment/Activity Tolerance:  Patient limited by confusion    Plan:  Continue per POC    Pain Assessment:  Patient does not c/o pain. Pain Re-assessment:  Patient does not c/o pain. Patient/Caregiver Education:  No education provided at this time. Safety Devices:  Bed alarm in place, Call light within reach, and Telesitter in use      Therapy Time  SLP Individual Minutes  Time In: 1410  Time Out:   [address]  Minutes: 8            Signature: Electronically signed by Kristian Petit.  MARIAH Taylor on 10/10/2023 at 3:17 PM

## 2023-10-10 NOTE — PROGRESS NOTES
Nephrology Progress Note    Assessment:  Hyponatremia  123mEq  DM type-2 with nephrotic syndrome  Hx CVA  Anemia            Plan: will repeat hypertonic one more time     Patient Active Problem List:     Acute CVA (cerebrovascular accident) ([address])     Altered mental status     Dysphagia, unspecified     Congenital cystic kidney disease     Bursitis of hip     Benign essential hypertension     Backache     Gastro-esophageal reflux disease with esophagitis     Urine retention     Impairment of balance     Palpitations     Piriformis syndrome     Urinary incontinence     Vascular disorder of extremity (HCC)     Impaired mobility and activities of daily living     Advanced care planning/counseling discussion     Goals of care, counseling/discussion     Palliative care encounter     Neurogenic bladder     History of recent fall     Cardiac arrhythmia     AC (acute kidney injury) ([address])     Hyponatremia     Poorly controlled diabetes mellitus ([address])     Pneumonia of both lower lobes due to infectious organism      Subjective:  Admit Date: 10/7/2023    Interval History: same status    Medications:  Scheduled Meds:   insulin glargine  12 Units SubCUTAneous BID    insulin lispro  3 Units SubCUTAneous TID WC    aspirin  81 mg Oral Daily    rosuvastatin  10 mg Oral Nightly    piperacillin-tazobactam  3,375 mg IntraVENous Q12H    sodium chloride flush  5-40 mL IntraVENous 2 times per day    insulin lispro  0-8 Units SubCUTAneous TID WC    insulin lispro  0-4 Units SubCUTAneous Nightly    heparin (porcine)  5,000 Units SubCUTAneous 3 times per day     Continuous Infusions:   sodium chloride      dextrose Stopped (10/08/23 0847)       CBC:   Recent Labs     10/09/23  0700 10/10/23  0241   WBC 11.3* 14.9*   HGB 9.0* 8.7*    271     CMP:    Recent Labs     10/09/23  1502 10/09/23  2006 10/10/23  0241   * 122* 123*   K 4.7 4.7 4.5   CL 87* 90* 91*   CO2 15* 16* 16*   BUN 51* 52* 53*   CREATININE 3.60* 3.56* 3.54*

## 2023-10-11 ENCOUNTER — TELEPHONE (OUTPATIENT)
Dept: PRIMARY CARE | Facility: CLINIC | Age: 88
End: 2023-10-11
Payer: MEDICARE

## 2023-10-11 PROBLEM — R45.1 AGITATION: Status: ACTIVE | Noted: 2023-10-11

## 2023-10-11 LAB
ANION GAP SERPL CALCULATED.3IONS-SCNC: 14 MEQ/L (ref 9–15)
ANION GAP SERPL CALCULATED.3IONS-SCNC: 16 MEQ/L (ref 9–15)
ANION GAP SERPL CALCULATED.3IONS-SCNC: 18 MEQ/L (ref 9–15)
BUN SERPL-MCNC: 58 MG/DL (ref 8–23)
BUN SERPL-MCNC: 58 MG/DL (ref 8–23)
BUN SERPL-MCNC: 59 MG/DL (ref 8–23)
CALCIUM SERPL-MCNC: 8.1 MG/DL (ref 8.5–9.9)
CALCIUM SERPL-MCNC: 8.2 MG/DL (ref 8.5–9.9)
CALCIUM SERPL-MCNC: 8.5 MG/DL (ref 8.5–9.9)
CHLORIDE SERPL-SCNC: 96 MEQ/L (ref 95–107)
CHLORIDE SERPL-SCNC: 96 MEQ/L (ref 95–107)
CHLORIDE SERPL-SCNC: 99 MEQ/L (ref 95–107)
CO2 SERPL-SCNC: 12 MEQ/L (ref 20–31)
CO2 SERPL-SCNC: 17 MEQ/L (ref 20–31)
CO2 SERPL-SCNC: 17 MEQ/L (ref 20–31)
CREAT SERPL-MCNC: 3.67 MG/DL (ref 0.5–0.9)
CREAT SERPL-MCNC: 3.72 MG/DL (ref 0.5–0.9)
CREAT SERPL-MCNC: 3.76 MG/DL (ref 0.5–0.9)
GLUCOSE BLD-MCNC: 110 MG/DL (ref 70–99)
GLUCOSE BLD-MCNC: 140 MG/DL (ref 70–99)
GLUCOSE BLD-MCNC: 157 MG/DL (ref 70–99)
GLUCOSE BLD-MCNC: 209 MG/DL (ref 70–99)
GLUCOSE SERPL-MCNC: 148 MG/DL (ref 70–99)
GLUCOSE SERPL-MCNC: 52 MG/DL (ref 70–99)
GLUCOSE SERPL-MCNC: 75 MG/DL (ref 70–99)
PERFORMED ON: ABNORMAL
POTASSIUM SERPL-SCNC: 4.4 MEQ/L (ref 3.4–4.9)
POTASSIUM SERPL-SCNC: 4.5 MEQ/L (ref 3.4–4.9)
POTASSIUM SERPL-SCNC: 5 MEQ/L (ref 3.4–4.9)
SODIUM SERPL-SCNC: 127 MEQ/L (ref 135–144)
SODIUM SERPL-SCNC: 129 MEQ/L (ref 135–144)
SODIUM SERPL-SCNC: 129 MEQ/L (ref 135–144)

## 2023-10-11 PROCEDURE — 1210000000 HC MED SURG R&B

## 2023-10-11 PROCEDURE — 36415 COLL VENOUS BLD VENIPUNCTURE: CPT

## 2023-10-11 PROCEDURE — 6370000000 HC RX 637 (ALT 250 FOR IP): Performed by: NURSE PRACTITIONER

## 2023-10-11 PROCEDURE — 2580000003 HC RX 258: Performed by: NURSE PRACTITIONER

## 2023-10-11 PROCEDURE — 99231 SBSQ HOSP IP/OBS SF/LOW 25: CPT | Performed by: PHYSICIAN ASSISTANT

## 2023-10-11 PROCEDURE — 6360000002 HC RX W HCPCS: Performed by: NURSE PRACTITIONER

## 2023-10-11 PROCEDURE — 99232 SBSQ HOSP IP/OBS MODERATE 35: CPT | Performed by: PHYSICIAN ASSISTANT

## 2023-10-11 PROCEDURE — 6370000000 HC RX 637 (ALT 250 FOR IP): Performed by: INTERNAL MEDICINE

## 2023-10-11 PROCEDURE — 80048 BASIC METABOLIC PNL TOTAL CA: CPT

## 2023-10-11 PROCEDURE — 2580000003 HC RX 258: Performed by: INTERNAL MEDICINE

## 2023-10-11 PROCEDURE — 93005 ELECTROCARDIOGRAM TRACING: CPT | Performed by: INTERNAL MEDICINE

## 2023-10-11 PROCEDURE — APPSS30 APP SPLIT SHARED TIME 16-30 MINUTES: Performed by: NURSE PRACTITIONER

## 2023-10-11 PROCEDURE — 6360000002 HC RX W HCPCS: Performed by: INTERNAL MEDICINE

## 2023-10-11 PROCEDURE — 99233 SBSQ HOSP IP/OBS HIGH 50: CPT | Performed by: NURSE PRACTITIONER

## 2023-10-11 PROCEDURE — 97162 PT EVAL MOD COMPLEX 30 MIN: CPT

## 2023-10-11 RX ORDER — IPRATROPIUM BROMIDE AND ALBUTEROL SULFATE 2.5; .5 MG/3ML; MG/3ML
1 SOLUTION RESPIRATORY (INHALATION)
Status: DISCONTINUED | OUTPATIENT
Start: 2023-10-11 | End: 2023-10-14

## 2023-10-11 RX ORDER — IPRATROPIUM BROMIDE AND ALBUTEROL SULFATE 2.5; .5 MG/3ML; MG/3ML
1 SOLUTION RESPIRATORY (INHALATION)
Status: DISCONTINUED | OUTPATIENT
Start: 2023-10-11 | End: 2023-10-11

## 2023-10-11 RX ORDER — HALOPERIDOL 1 MG/1
0.5 TABLET ORAL EVERY 6 HOURS PRN
Status: DISCONTINUED | OUTPATIENT
Start: 2023-10-11 | End: 2023-10-14 | Stop reason: HOSPADM

## 2023-10-11 RX ADMIN — HEPARIN SODIUM 5000 UNITS: 5000 INJECTION INTRAVENOUS; SUBCUTANEOUS at 08:14

## 2023-10-11 RX ADMIN — HEPARIN SODIUM 5000 UNITS: 5000 INJECTION INTRAVENOUS; SUBCUTANEOUS at 00:37

## 2023-10-11 RX ADMIN — INSULIN GLARGINE 8 UNITS: 100 INJECTION, SOLUTION SUBCUTANEOUS at 21:26

## 2023-10-11 RX ADMIN — Medication 5 ML: at 21:26

## 2023-10-11 RX ADMIN — PIPERACILLIN AND TAZOBACTAM 3375 MG: 3; .375 INJECTION, POWDER, LYOPHILIZED, FOR SOLUTION INTRAVENOUS at 15:07

## 2023-10-11 RX ADMIN — HEPARIN SODIUM 5000 UNITS: 5000 INJECTION INTRAVENOUS; SUBCUTANEOUS at 15:05

## 2023-10-11 RX ADMIN — PIPERACILLIN AND TAZOBACTAM 3375 MG: 3; .375 INJECTION, POWDER, LYOPHILIZED, FOR SOLUTION INTRAVENOUS at 00:38

## 2023-10-11 RX ADMIN — ASPIRIN 81 MG CHEWABLE TABLET 81 MG: 81 TABLET CHEWABLE at 08:14

## 2023-10-11 RX ADMIN — HALOPERIDOL 0.5 MG: 1 TABLET ORAL at 15:08

## 2023-10-11 RX ADMIN — Medication 6 ML: at 08:23

## 2023-10-11 ASSESSMENT — ENCOUNTER SYMPTOMS
WHEEZING: 0
COUGH: 0
DIARRHEA: 0
VOMITING: 0
SHORTNESS OF BREATH: 0
NAUSEA: 0
TROUBLE SWALLOWING: 0
COLOR CHANGE: 0
COUGH: 1

## 2023-10-11 ASSESSMENT — PAIN SCALES - WONG BAKER
WONGBAKER_NUMERICALRESPONSE: 0

## 2023-10-11 ASSESSMENT — PAIN SCALES - GENERAL
PAINLEVEL_OUTOF10: 0

## 2023-10-11 NOTE — TELEPHONE ENCOUNTER
BASIA CALLED    SHE IS ADMITTED INTO [facility] AND THEY WANT TO DISCHARGE TO HOSPICE AND BEFORE DOING THAT BASIA WOULD LIKE TO TALK WITH YOU.  HAS SEVERAL QUESTIONS BEFORE DECISION IS MADE. [phone]

## 2023-10-11 NOTE — PROGRESS NOTES
Physical Therapy Med Surg Initial Assessment  Facility/Department: [facility]  Room: I308/C698-37       NAME: Jennifer Mosqueda  : 3/6/1933 (80 y.o.)  MRN: [id number]  CODE STATUS: DNR-CC    Date of Service: 10/11/2023    Patient Diagnosis(es): Hyponatremia [E87.1]  Retention of urine [R33.9]  Elevated troponin [R79.89]  AC (acute kidney injury) ([address]) [N17.9]  Pneumonia of both lower lobes due to infectious organism [J18.9]  New onset of congestive heart failure ([address]) [I50.9]   Chief Complaint   Patient presents with    Altered Mental Status     Last known well yesterday     Patient Active Problem List    Diagnosis Date Noted    Agitation 10/11/2023    Encounter for hospice care discussion 10/10/2023    Poorly controlled diabetes mellitus ([address]) 10/09/2023    Pneumonia of both lower lobes due to infectious organism 10/09/2023    Hyponatremia 10/07/2023    History of recent fall 2023    Cardiac arrhythmia 2023    AC (acute kidney injury) ([address]) 2023    Neurogenic bladder 2023    Advanced care planning/counseling discussion 2023    Goals of care, counseling/discussion 2023    Palliative care encounter 2023    Altered mental status 2023    Impaired mobility and activities of daily living 2023    Acute CVA (cerebrovascular accident) ([address]) 2023    Impairment of balance 2023    Bursitis of hip 2022    Palpitations 2022    Piriformis syndrome 2022    Vascular disorder of extremity ([address]) 2020    Benign essential hypertension 2019    Dysphagia, unspecified 2018    Gastro-esophageal reflux disease with esophagitis 2018    Backache 2009    Urinary retention 2009    Urinary incontinence 2009    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        Past Medical History:   Diagnosis Date    Back pain 2009    Balance problems 2020    Constipation 2022    Dysphagia 2022    Falls

## 2023-10-11 NOTE — PROGRESS NOTES
Subjective:      Patient ID: Sarita Biggs is a 80 y.o. female    HPI 80year old female who's wu catheter is draining clear yellow urine    Past Medical History:   Diagnosis Date    Back pain 07/17/2009    Balance problems 05/16/2020    Constipation 08/03/2022    Dysphagia 08/03/2022    Falls 08/03/2022    Hyperlipidemia     Hypertension     Incomplete bladder emptying 04/28/2009    Neuropathy     Piriformis syndrome 08/03/2022    Stage 3 chronic kidney disease ([address]) 08/2022    Type 2 diabetes mellitus without complication (HCC)     UTI (urinary tract infection) 08/30/2022    Varicose veins of both lower extremities 05/16/2020     History reviewed. No pertinent surgical history. Social History     Socioeconomic History    Marital status: Unknown     Spouse name: None    Number of children: None    Years of education: None    Highest education level: None   Tobacco Use    Smoking status: Unknown   Vaping Use    Vaping Use: Never used   Substance and Sexual Activity    Alcohol use: Never    Drug use: Never   Social History Narrative    Lives With: Alone, dtr Celester Reasons lives nearby    Type of Home: House in [address] [place] [address] Dr: Able to Live on Main level with bedroom/bathroom, Two level    Home Access: Stairs to enter with rails- Number of Steps: 2- Rails: Both    Bathroom Shower/Tub: Walk-in shower    Bathroom Toilet: Standard-Equipment: Shower chair, Hand-held shower, Grab bars in shower-Accessibility: Walker accessible    Home Equipment: [place], rolling, Rollator (multiple ww within the house - rollator for out of house)    Receives Help From: Family    ADL Assistance: Independent, [address]: Needs assistance (can make light sandwiches)-Responsibilities: Yes    Other (Comment): daughter cooks, transports and shops.  for chores.     Ambulation Assistance: Independent (primarily using ww or using rails within home - occasionally with no device)    Transfer Assistance: Independent    Active : No, Patient's  Info: family, Mode of Transportation: Car    Occupation: Retired    Additional Comments: above information provided by pts daughter with pt approval          History reviewed. No pertinent family history.   Current Facility-Administered Medications   Medication Dose Route Frequency Provider Last Rate Last Admin    insulin glargine (LANTUS) injection vial 8 Units  8 Units SubCUTAneous BID Anderson Reza MD        insulin lispro (HUMALOG) injection vial 0-4 Units  0-4 Units SubCUTAneous TID  Anderson Reza MD   1 Units at 10/10/23 1923    insulin lispro (HUMALOG) injection vial 0-4 Units  0-4 Units SubCUTAneous Nightly Anderson Reza MD        insulin lispro (HUMALOG) injection vial 3 Units  3 Units SubCUTAneous TID  Anderson Reza MD   3 Units at 10/10/23 1923    aspirin chewable tablet 81 mg  81 mg Oral Daily Chhaya Reza MD   81 mg at 10/11/23 0814    rosuvastatin (CRESTOR) tablet 10 mg  10 mg Oral Nightly Chhaya Reza MD   10 mg at 10/10/23 2157    piperacillin-tazobactam (ZOSYN) 3,375 mg in sodium chloride 0.9 % 50 mL IVPB (mini-bag)  3,375 mg IntraVENous Q12H Chhaya Reza MD   Stopped at 10/11/23 0114    sodium chloride flush 0.9 % injection 5-40 mL  5-40 mL IntraVENous 2 times per day Bolzan-Heath Fernandez APRN - CNP   6 mL at 10/11/23 0823    sodium chloride flush 0.9 % injection 5-40 mL  5-40 mL IntraVENous PRN Bolzan-RocheHeath APRN - CNP        0.9 % sodium chloride infusion   IntraVENous PRN Bolzan-RocheKushalo, APRN - CNP        ondansetron (ZOFRAN-ODT) disintegrating tablet 4 mg  4 mg Oral Q8H PRN Bolzan-Heath Fernandez APRN - CNP        Or    ondansetron (ZOFRAN) injection 4 mg  4 mg IntraVENous Q6H PRN Bolzan-RocheHeath APRN - CNP   4 mg at 10/08/23 1621    polyethylene glycol (GLYCOLAX) packet 17 g  17 g Oral Daily PRN Bolzan-Heath Fernandez APRN - CNP        acetaminophen (TYLENOL) tablet 650 mg  650 mg Oral Q6H PRN

## 2023-10-11 NOTE — FLOWSHEET NOTE
Shift assessment complete. Pt confused. Avasys at bedside. Pt changed to 1:1     0031 Perfect serve to  pt continuing to pull at IV and Mcint[place]. Hitting PCA. Awaiting response. 2545 Qing notified of Na 121 1910 Perfect serve to Dr. Ashley Irene. Pt Wheezing. Respiratory asked to assess pt.

## 2023-10-11 NOTE — PROGRESS NOTES
10/11/23 1200   RT Protocol   History Pulmonary Disease 0   Respiratory pattern 0   Breath sounds 6   Cough 0   Indications for Bronchodilator Therapy Wheezing associated with pulm disorder   Bronchodilator Assessment Score 6

## 2023-10-11 NOTE — CARE COORDINATION
LSW met with patient's daughter at bedside to follow up on SNF choices. She stated they would like [facility] since patient's PCP visits there. She also mentioned family is still deciding between SNF and hospice and asked LSW to check if both skilled and long term bed are available. Referral faxed to [facility] NR admissions. Patient's daughter called with questions about qualifications/cost for hospice center. LSW offered to contact [address] to review this information and she was agreeable. LSW spoke with Yara Bautista who said someone from hospice would reach out to answer her questions.

## 2023-10-12 LAB
EKG ATRIAL RATE: 84 BPM
EKG P AXIS: 30 DEGREES
EKG P-R INTERVAL: 150 MS
EKG Q-T INTERVAL: 342 MS
EKG QRS DURATION: 76 MS
EKG QTC CALCULATION (BAZETT): 404 MS
EKG R AXIS: 30 DEGREES
EKG T AXIS: 106 DEGREES
EKG VENTRICULAR RATE: 84 BPM
GLUCOSE BLD-MCNC: 183 MG/DL (ref 70–99)
GLUCOSE BLD-MCNC: 189 MG/DL (ref 70–99)
GLUCOSE BLD-MCNC: 77 MG/DL (ref 70–99)
GLUCOSE BLD-MCNC: 99 MG/DL (ref 70–99)
PERFORMED ON: ABNORMAL
PERFORMED ON: ABNORMAL
PERFORMED ON: NORMAL
PERFORMED ON: NORMAL
PROINSULIN P 12H FAST SERPL-SCNC: 20.8 PMOL/L

## 2023-10-12 PROCEDURE — 2700000000 HC OXYGEN THERAPY PER DAY

## 2023-10-12 PROCEDURE — 94640 AIRWAY INHALATION TREATMENT: CPT

## 2023-10-12 PROCEDURE — 6360000002 HC RX W HCPCS: Performed by: NURSE PRACTITIONER

## 2023-10-12 PROCEDURE — 6370000000 HC RX 637 (ALT 250 FOR IP): Performed by: INTERNAL MEDICINE

## 2023-10-12 PROCEDURE — 99231 SBSQ HOSP IP/OBS SF/LOW 25: CPT | Performed by: PHYSICIAN ASSISTANT

## 2023-10-12 PROCEDURE — 2580000003 HC RX 258: Performed by: NURSE PRACTITIONER

## 2023-10-12 PROCEDURE — 6360000002 HC RX W HCPCS: Performed by: INTERNAL MEDICINE

## 2023-10-12 PROCEDURE — 93005 ELECTROCARDIOGRAM TRACING: CPT | Performed by: INTERNAL MEDICINE

## 2023-10-12 PROCEDURE — 1210000000 HC MED SURG R&B

## 2023-10-12 PROCEDURE — 99231 SBSQ HOSP IP/OBS SF/LOW 25: CPT | Performed by: PSYCHIATRY & NEUROLOGY

## 2023-10-12 PROCEDURE — 97110 THERAPEUTIC EXERCISES: CPT

## 2023-10-12 PROCEDURE — APPSS15 APP SPLIT SHARED TIME 0-15 MINUTES: Performed by: NURSE PRACTITIONER

## 2023-10-12 PROCEDURE — 99232 SBSQ HOSP IP/OBS MODERATE 35: CPT | Performed by: NURSE PRACTITIONER

## 2023-10-12 PROCEDURE — 6370000000 HC RX 637 (ALT 250 FOR IP): Performed by: NURSE PRACTITIONER

## 2023-10-12 PROCEDURE — 2580000003 HC RX 258: Performed by: INTERNAL MEDICINE

## 2023-10-12 PROCEDURE — 99232 SBSQ HOSP IP/OBS MODERATE 35: CPT | Performed by: PHYSICIAN ASSISTANT

## 2023-10-12 PROCEDURE — 94761 N-INVAS EAR/PLS OXIMETRY MLT: CPT

## 2023-10-12 RX ADMIN — ACETAMINOPHEN 650 MG: 325 TABLET ORAL at 20:42

## 2023-10-12 RX ADMIN — Medication 10 ML: at 07:27

## 2023-10-12 RX ADMIN — PIPERACILLIN AND TAZOBACTAM 3375 MG: 3; .375 INJECTION, POWDER, LYOPHILIZED, FOR SOLUTION INTRAVENOUS at 12:21

## 2023-10-12 RX ADMIN — INSULIN GLARGINE 8 UNITS: 100 INJECTION, SOLUTION SUBCUTANEOUS at 20:42

## 2023-10-12 RX ADMIN — HEPARIN SODIUM 5000 UNITS: 5000 INJECTION INTRAVENOUS; SUBCUTANEOUS at 07:56

## 2023-10-12 RX ADMIN — HEPARIN SODIUM 5000 UNITS: 5000 INJECTION INTRAVENOUS; SUBCUTANEOUS at 16:25

## 2023-10-12 RX ADMIN — ROSUVASTATIN CALCIUM 10 MG: 10 TABLET, FILM COATED ORAL at 20:42

## 2023-10-12 RX ADMIN — Medication 10 ML: at 20:49

## 2023-10-12 RX ADMIN — IPRATROPIUM BROMIDE AND ALBUTEROL SULFATE 1 DOSE: 2.5; .5 SOLUTION RESPIRATORY (INHALATION) at 07:10

## 2023-10-12 RX ADMIN — ASPIRIN 81 MG CHEWABLE TABLET 81 MG: 81 TABLET CHEWABLE at 07:58

## 2023-10-12 RX ADMIN — PIPERACILLIN AND TAZOBACTAM 3375 MG: 3; .375 INJECTION, POWDER, LYOPHILIZED, FOR SOLUTION INTRAVENOUS at 02:09

## 2023-10-12 ASSESSMENT — PAIN DESCRIPTION - DESCRIPTORS
DESCRIPTORS: THROBBING
DESCRIPTORS: THROBBING

## 2023-10-12 ASSESSMENT — PAIN SCALES - WONG BAKER
WONGBAKER_NUMERICALRESPONSE: 0

## 2023-10-12 ASSESSMENT — PAIN DESCRIPTION - LOCATION
LOCATION: HEAD
LOCATION: HEAD

## 2023-10-12 ASSESSMENT — ENCOUNTER SYMPTOMS
WHEEZING: 0
COLOR CHANGE: 0
TROUBLE SWALLOWING: 0
NAUSEA: 0
SHORTNESS OF BREATH: 0
DIARRHEA: 0
COUGH: 0
COUGH: 1
VOMITING: 0

## 2023-10-12 ASSESSMENT — PAIN SCALES - GENERAL
PAINLEVEL_OUTOF10: 0
PAINLEVEL_OUTOF10: 4
PAINLEVEL_OUTOF10: 4

## 2023-10-12 NOTE — PROGRESS NOTES
neurological standpoint. Patient will be watched for any other focal findings. 60% time spent on evaluating patient    10/10/2023:  Acute encephalopathy in the setting of pneumonia, hyponatremia, pleural effusions, AC, hypotension  Remains encephalopathic  Family has declined hospice. Palliative care now following. CODE STATUS changed to DNR CC.  EEG completed with report pending. Remains hyponatremic at 122. Patient had episode of hypoglycemia this morning at 45. I have personally performed a face to face diagnostic evaluation on this patient, reviewed all data and investigations, and am the sole provider of all clinical decisions on the neurological status of this patient. Patient remains oriented to self with multiple medical issues as noted above. She still remains encephalopathic. Family declined hospice and will remain DNR CC. Sodium still remains at 122. Patient had a hypoglycemic event as well. 60% time spent on evaluating patient    25/48/5735:  Acute metabolic encephalopathy in the setting of pneumonia, hyponatremia, AC, pleural effusion, hypotension. Patient with ongoing hyponatremia and elevated creatinine of 3.76. There is possibility of underlying dementia. Daughter is at bedside and it was discussed with her that prognosis cannot be given in terms of mental status until underlying infection and electrolyte abnormalities and kidney dysfunction are corrected. Palliative care is following and assisting with goals of care and hospice discussion. Family wanting conservative measures at this time. Patient remains DNR CC. I have personally performed a face to face diagnostic evaluation on this patient, reviewed all data and investigations, and am the sole provider of all clinical decisions on the neurological status of this patient. Acute metabolic encephalopathy in the face of pneumonia hyponatremia AC and multiple other issues.   At this time we cannot diagnose a dementia and

## 2023-10-12 NOTE — PROGRESS NOTES
CLICK) BASIC MOBILITY  AM-PAC Inpatient Mobility Raw Score : 6      Therapy Time   Individual   Time In 1315   Time Out 1332   Minutes 17      BM: 6  Therex: [address] [place], [place], 10/12/23 at 2:40 PM         Definitions for assistance levels  Independent = pt does not require any physical supervision or assistance from another person for activity completion. Device may be needed.   Stand by assistance = pt requires verbal cues or instructions from another person, close to but not touching, to perform the activity  Minimal assistance= pt performs 75% or more of the activity; assistance is required to complete the activity  Moderate assistance= pt performs 50% of the activity; assistance is required to complete the activity  Maximal assistance = pt performs 25% of the activity; assistance is required to complete the activity  Dependent = pt requires total physical assistance to accomplish the task

## 2023-10-12 NOTE — CARE COORDINATION
ADDISONW spoke with Ria from [facility]. She stated they have skilled and long term beds available and would be able to accept patient for either. Message left for daughter to check if sh is interested in skilled or long term for patient. Spoke with daughter who said they are considering long term care instead of Skilled. She told LSW that she and her  will make a decision about hospice tomorrow.

## 2023-10-12 NOTE — PROGRESS NOTES
Subjective:      Patient ID: Jennifer Mosqueda is a 80 y.o. female    HPI 80year old female who's wu catheter is draining clear yellow urine    Past Medical History:   Diagnosis Date    Back pain 07/17/2009    Balance problems 05/16/2020    Constipation 08/03/2022    Dysphagia 08/03/2022    Falls 08/03/2022    Hyperlipidemia     Hypertension     Incomplete bladder emptying 04/28/2009    Neuropathy     Piriformis syndrome 08/03/2022    Stage 3 chronic kidney disease ([address]) 08/2022    Type 2 diabetes mellitus without complication (HCC)     UTI (urinary tract infection) 08/30/2022    Varicose veins of both lower extremities 05/16/2020     History reviewed. No pertinent surgical history. Social History     Socioeconomic History    Marital status: Unknown     Spouse name: None    Number of children: None    Years of education: None    Highest education level: None   Tobacco Use    Smoking status: Unknown   Vaping Use    Vaping Use: Never used   Substance and Sexual Activity    Alcohol use: Never    Drug use: Never   Social History Narrative    Lives With: Alone, dtr [address] [place] [address] lives nearby    Type of Home: House in [address] [place] [address] Dr: Able to Live on Main level with bedroom/bathroom, Two level    Home Access: Stairs to enter with rails- Number of Steps: 2- Rails: Both    Bathroom Shower/Tub: Walk-in shower    Bathroom Toilet: Standard-Equipment: Shower chair, Hand-held shower, Grab bars in shower-Accessibility: Walker accessible    Home Equipment: Marcos Moores, rolling, Rollator (multiple ww within the house - rollator for out of house)    Receives Help From: Family    ADL Assistance: Independent, [facility]: Needs assistance (can make light sandwiches)-Responsibilities: Yes    Other (Comment): daughter cooks, transports and shops.  for chores.     Ambulation Assistance: Independent (primarily using ww or using rails within home - occasionally with no device)    Transfer Assistance: Independent    Active : No, Patient's  Info: family, Mode of Transportation: Car    Occupation: Retired    Additional Comments: above information provided by pts daughter with pt approval          History reviewed. No pertinent family history.   Current Facility-Administered Medications   Medication Dose Route Frequency Provider Last Rate Last Admin    haloperidol (HALDOL) tablet 0.5 mg  0.5 mg Oral Q6H PRN Rosi Feldman APRN - CNP   0.5 mg at 10/11/23 1508    ipratropium 0.5 mg-albuterol 2.5 mg (DUONEB) nebulizer solution 1 Dose  1 Dose Inhalation BID RT Estephania Pruitt MD   1 Dose at 10/12/23 0710    insulin glargine (LANTUS) injection vial 8 Units  8 Units SubCUTAneous BID Anderson Reza MD   8 Units at 10/11/23 2126    insulin lispro (HUMALOG) injection vial 0-4 Units  0-4 Units SubCUTAneous TID Anderson Bronson MD   1 Units at 10/10/23 1923    insulin lispro (HUMALOG) injection vial 0-4 Units  0-4 Units SubCUTAneous Nightly Anderson Reza MD        insulin lispro (HUMALOG) injection vial 3 Units  3 Units SubCUTAneous TID Anderson Bronson MD   3 Units at 10/10/23 1923    aspirin chewable tablet 81 mg  81 mg Oral Daily Estephania Pruitt MD   81 mg at 10/12/23 0758    rosuvastatin (CRESTOR) tablet 10 mg  10 mg Oral Nightly Estephania Pruitt MD   10 mg at 10/10/23 2157    piperacillin-tazobactam (ZOSYN) 3,375 mg in sodium chloride 0.9 % 50 mL IVPB (mini-bag)  3,375 mg IntraVENous Q12H Estephania Pruitt  mL/hr at 10/12/23 1221 3,375 mg at 10/12/23 1221    sodium chloride flush 0.9 % injection 5-40 mL  5-40 mL IntraVENous 2 times per day Heath Acosta APRN - CNP   10 mL at 10/12/23 0727    sodium chloride flush 0.9 % injection 5-40 mL  5-40 mL IntraVENous PRN Heath Acosta APRN - CNP        0.9 % sodium chloride infusion   IntraVENous PRN BolzanHeath Dickens APRN - CNP        ondansetron (ZOFRAN-ODT) disintegrating tablet 4 mg  4 mg Oral Q8H PRN Heath Acosta APRN - CNP

## 2023-10-12 NOTE — PLAN OF CARE
Patient progressing towards discharge    Problem: Discharge Planning  Goal: Discharge to home or other facility with appropriate resources  Outcome: Progressing  Flowsheets (Taken 10/12/2023 0800)  Discharge to home or other facility with appropriate resources: Identify barriers to discharge with patient and caregiver     Problem: Skin/Tissue Integrity  Goal: Absence of new skin breakdown  Description: 1. Monitor for areas of redness and/or skin breakdown  2. Assess vascular access sites hourly  3. Every 4-6 hours minimum:  Change oxygen saturation probe site  4. Every 4-6 hours:  If on nasal continuous positive airway pressure, respiratory therapy assess nares and determine need for appliance change or resting period. Outcome: Progressing     Problem: Safety - Adult  Goal: Free from fall injury  Outcome: Progressing     Problem: ABCDS Injury Assessment  Goal: Absence of physical injury  Outcome: Progressing     Problem: Confusion  Goal: Confusion, delirium, dementia, or psychosis is improved or at baseline  Description: INTERVENTIONS:  1. Assess for possible contributors to thought disturbance, including medications, impaired vision or hearing, underlying metabolic abnormalities, dehydration, psychiatric diagnoses, and notify attending LIP  2. [place] high risk fall precautions, as indicated  3. Provide frequent short contacts to provide reality reorientation, refocusing and direction  4. Decrease environmental stimuli, including noise as appropriate  5. Monitor and intervene to maintain adequate nutrition, hydration, elimination, sleep and activity  6. If unable to ensure safety without constant attention obtain sitter and review sitter guidelines with assigned personnel  7.  Initiate Psychosocial CNS and Spiritual Care consult, as indicated  Outcome: Progressing     Problem: Chronic Conditions and Co-morbidities  Goal: Patient's chronic conditions and co-morbidity symptoms are monitored and maintained or

## 2023-10-12 NOTE — PROGRESS NOTES
[facility] [place]   Facility/Department: [address] [place] [address].  Speech Language Pathology    Luis F Hernandez  3/6/1933  [id number]    Date: 10/12/2023      Speech Therapy attempted to see Luis F Hernandez on this date for a/an:    Treatment    Pt was unable to be seen due to: Other: SLP arrived in room with RN and PCA present. RN and PCA adjusting pt prior to attempting to given PO d/t low sugar. RN reports decreased PO intake. Pt began to have a bowel movement during adjustment. PCA providing personal care.  To re-attempt at next opportunity         Electronically signed by MARIAH Gonzales on 10/12/23 at 11:11 AM EDT

## 2023-10-12 NOTE — PROCEDURES
[place] [address] [facility]                          ELECTROENCEPHALOGRAM REPORT    PATIENT NAME: Kaylene Gerardo                      :        1933  MED REC NO:   [id number]                            ROOM:       N159  ACCOUNT NO:   [de-identified]                           ADMIT DATE: 10/07/2023  PROVIDER:     Louisa Sahni MD    DATE OF EEG:  10/12/2023    REASON FOR STUDY:  This is a spontaneous 20-channel recording for this  patient with hyponatremia and dysphagia. MEDICATIONS:  Only listed, Humulin and Zofran. EEG FINDINGS:  The background of this EEG shows diffuse slowing in the  range of about 4-6 theta slowing. Significant muscle and surface  artifacts continued throughout the EEG recording. No definite  asymmetries or epileptiform discharge noted. Tests was completed  earlier, as the patient is very uncooperative. Marisol Mcfarland IMPRESSION:  This is an abnormal EEG recording by the virtue of diffuse  slowing, though this has significant frequent movement artifacts. If  clinically indicated, this may be repeated but no definite seizures are  noted on the short tracing that we have. Clinical correlation  recommended.         Louisa Sahni MD    D: 10/12/2023 9:02:49       T: 10/12/2023 9:06:53     [id number]  Job#: [id number]     Doc#: [id number]    CC:

## 2023-10-12 NOTE — PROGRESS NOTES
Palliative Care Progress Note  Patient: Ambrose Bailey  Gender: female  YOB: 1933  Unit/Bed: Y235/L152-63  CodeStatus: DNR-CC  Inpatient Treatment Team: Treatment Team: Attending Provider: Tanya Lemon MD; Consulting Physician: Inge Trujillo DO; Hospitalist: Tanya Lemon MD; Consulting Physician: Emma Carver MD; Utilization Reviewer: Stephanie Arroyo RN; Consulting Physician: Manju You MD; : Best Knott, ROSALIND; Consulting Physician: Melonie Hernandez MD; Registered Nurse: Travis Grajeda, RN; : Reza Nieves, RN; Registered Nurse: Lindsey Mcpherson RN  Admit Date:  10/7/2023    Chief Complaint: Altered mental status    History of Presenting Illness:      Ambrose Bailey is a 80 y.o. female on hospital day 5 with a history of DM 2, HTN, TIA, CKD 4, depression, anemia, and urinary retention. Patient was brought to the emergency room from [facility] due to altered mental status and lethargy. Patient alert and oriented x2 with complaints of shortness of breath and fatigue. Patient recently diagnosed with pneumonia at [facility]. Patient with recent admission to [facility] on 9/27. Patient was admitted for hyponatremia and AC. Palliative care was consulted for goals of care, CODE STATUS discussion, family support, and symptom management. Upon entering room patient is laying in bed. Patient is alert and oriented x2. Patient reports having pain in her \"uterus\". Patient is pulling at her Mcintosh catheter. Patient is on room air and she does not appear to be in any distress. Patient confused on why she is in the hospital.    Patients daughter had hospice meeting and is not ready at this time. Patient is to go to [facility] with palliative care when medically cleared. Patient's CODE STATUS is a DNR CC. Per daughter, patient has had weight loss and was not eating as well as she was. Patient had been at [facility] for therapy before this admission.      Most General: Lids are normal.      Pupils: Pupils are equal, round, and reactive to light. Cardiovascular:      Rate and Rhythm: Normal rate and regular rhythm. Pulses: Normal pulses. Heart sounds: Normal heart sounds. Pulmonary:      Effort: Pulmonary effort is normal. No respiratory distress. Breath sounds: Decreased breath sounds and wheezing present. No rhonchi or rales. Abdominal:      General: Abdomen is flat. Bowel sounds are normal.      Palpations: Abdomen is soft. Tenderness: There is no abdominal tenderness. There is no guarding. Genitourinary:     Comments: Mcintosh  Musculoskeletal:      Cervical back: Neck supple. Right lower le+ Edema present. Left lower le+ Edema present. Skin:     General: Skin is warm and dry. Findings: Bruising present. No rash or wound. Neurological:      Mental Status: She is lethargic and confused. Motor: Weakness present. No tremor. Psychiatric:         Cognition and Memory: Cognition is impaired. Memory is impaired. Judgment: Judgment is impulsive and inappropriate. Allergies:       Allergies   Allergen Reactions    Shellfish Allergy Hives and Anaphylaxis    Atorvastatin Other (See Comments)    Gabapentin Other (See Comments)     Increased blood sugar      Sulfa Antibiotics        Medications:      Current Facility-Administered Medications   Medication Dose Route Frequency Provider Last Rate Last Admin    haloperidol (HALDOL) tablet 0.5 mg  0.5 mg Oral Q6H PRN Miahp, MADAI Yun - CNP   0.5 mg at 10/11/23 1508    ipratropium 0.5 mg-albuterol 2.5 mg (DUONEB) nebulizer solution 1 Dose  1 Dose Inhalation BID RT Joanna Santoyo MD   1 Dose at 10/12/23 0710    insulin glargine (LANTUS) injection vial 8 Units  8 Units SubCUTAneous BID Anderson Reza MD   8 Units at 10/11/23 2126    insulin lispro (HUMALOG) injection vial 0-4 Units  0-4 Units SubCUTAneous TID WC Rita Eduardo MD   1 Units at 10/10/23 1923

## 2023-10-12 NOTE — PROGRESS NOTES
10/11/23 2307   RT Protocol   History Pulmonary Disease 0   Respiratory pattern 0   Breath sounds 6   Cough 0   Indications for Bronchodilator Therapy Wheezing associated with pulm disorder   Bronchodilator Assessment Score 6

## 2023-10-12 NOTE — FLOWSHEET NOTE
1100: Patient assessment completed, patient alert to self only at this time. Discoloration noted to left and right ABD around to left and right flank, Dr Laurence Choudhary aware, NNO at this time, 1-2 plus pitted edema to bilateral feet. Dr Laurence Choudhary made aware, [place] at this time. Patient deneis pain when asked. Generalized discolorations throughput upper and lower extremities. Remains one on one, Maintains IV ATB for UTI. Mcintosh catheter intact and patent, draining to CD. Patient LCTA. Call light in reach. .Electronically signed by Georgie Lopez RN on 10/12/2023 at 11:33 AM

## 2023-10-12 NOTE — PROGRESS NOTES
Nephrology Progress Note    Assessment:  Hyponatremia improved since admission  Encephalopathy same  Hx CVA  DM type-2 assoc nephroric      Plan: Renal status remains same GFR 6 months ago 24 cc/min    Patient Active Problem List:     Acute CVA (cerebrovascular accident) ([address])     Altered mental status     Dysphagia, unspecified     Congenital cystic kidney disease     Bursitis of hip     Benign essential hypertension     Backache     Gastro-esophageal reflux disease with esophagitis     Urinary retention     Impairment of balance     Palpitations     Piriformis syndrome     Urinary incontinence     Vascular disorder of extremity (HCC)     Impaired mobility and activities of daily living     Advanced care planning/counseling discussion     Goals of care, counseling/discussion     Palliative care encounter     Neurogenic bladder     History of recent fall     Cardiac arrhythmia     AC (acute kidney injury) ([address])     Hyponatremia     Poorly controlled diabetes mellitus ([address])     Pneumonia of both lower lobes due to infectious organism     Encounter for hospice care discussion     Agitation      Subjective:  Admit Date: 10/7/2023    Interval History: unable to interview    Medications:  Scheduled Meds:   ipratropium 0.5 mg-albuterol 2.5 mg  1 Dose Inhalation BID RT    insulin glargine  8 Units SubCUTAneous BID    insulin lispro  0-4 Units SubCUTAneous TID WC    insulin lispro  0-4 Units SubCUTAneous Nightly    insulin lispro  3 Units SubCUTAneous TID WC    aspirin  81 mg Oral Daily    rosuvastatin  10 mg Oral Nightly    piperacillin-tazobactam  3,375 mg IntraVENous Q12H    sodium chloride flush  5-40 mL IntraVENous 2 times per day    heparin (porcine)  5,000 Units SubCUTAneous 3 times per day     Continuous Infusions:   sodium chloride      dextrose Stopped (10/08/23 0877)       CBC:   Recent Labs     10/10/23  0241   WBC 14.9*   HGB 8.7*        CMP:    Recent Labs     10/10/23  2342 10/11/23  7911

## 2023-10-13 LAB
ANION GAP SERPL CALCULATED.3IONS-SCNC: 12 MEQ/L (ref 9–15)
BACTERIA BLD CULT ORG #2: NORMAL
BACTERIA BLD CULT: NORMAL
BASOPHILS # BLD: 0 K/UL (ref 0–0.2)
BASOPHILS NFR BLD: 0.3 %
BUN SERPL-MCNC: 49 MG/DL (ref 8–23)
CALCIUM SERPL-MCNC: 8.9 MG/DL (ref 8.5–9.9)
CHLORIDE SERPL-SCNC: 104 MEQ/L (ref 95–107)
CO2 SERPL-SCNC: 20 MEQ/L (ref 20–31)
CREAT SERPL-MCNC: 3.26 MG/DL (ref 0.5–0.9)
EKG ATRIAL RATE: 90 BPM
EKG ATRIAL RATE: 94 BPM
EKG P AXIS: 76 DEGREES
EKG P-R INTERVAL: 146 MS
EKG Q-T INTERVAL: 312 MS
EKG Q-T INTERVAL: 332 MS
EKG QRS DURATION: 70 MS
EKG QRS DURATION: 70 MS
EKG QTC CALCULATION (BAZETT): 390 MS
EKG QTC CALCULATION (BAZETT): 399 MS
EKG R AXIS: 61 DEGREES
EKG R AXIS: 72 DEGREES
EKG T AXIS: 83 DEGREES
EKG T AXIS: 92 DEGREES
EKG VENTRICULAR RATE: 87 BPM
EKG VENTRICULAR RATE: 94 BPM
EOSINOPHIL # BLD: 0.1 K/UL (ref 0–0.7)
EOSINOPHIL NFR BLD: 0.9 %
ERYTHROCYTE [DISTWIDTH] IN BLOOD BY AUTOMATED COUNT: 13.8 % (ref 11.5–14.5)
GLUCOSE BLD-MCNC: 103 MG/DL (ref 70–99)
GLUCOSE BLD-MCNC: 122 MG/DL (ref 70–99)
GLUCOSE BLD-MCNC: 124 MG/DL (ref 70–99)
GLUCOSE BLD-MCNC: 127 MG/DL (ref 70–99)
GLUCOSE BLD-MCNC: 127 MG/DL (ref 70–99)
GLUCOSE SERPL-MCNC: 109 MG/DL (ref 70–99)
HCT VFR BLD AUTO: 26.3 % (ref 37–47)
HGB BLD-MCNC: 8.7 G/DL (ref 12–16)
LYMPHOCYTES # BLD: 0.7 K/UL (ref 1–4.8)
LYMPHOCYTES NFR BLD: 9.3 %
MCH RBC QN AUTO: 29.2 PG (ref 27–31.3)
MCHC RBC AUTO-ENTMCNC: 33.1 % (ref 33–37)
MCV RBC AUTO: 88.3 FL (ref 79.4–94.8)
MONOCYTES # BLD: 0.6 K/UL (ref 0.2–0.8)
MONOCYTES NFR BLD: 7.8 %
NEUTROPHILS # BLD: 6.5 K/UL (ref 1.4–6.5)
NEUTS SEG NFR BLD: 81.2 %
PERFORMED ON: ABNORMAL
PLATELET # BLD AUTO: 258 K/UL (ref 130–400)
POTASSIUM SERPL-SCNC: 4.9 MEQ/L (ref 3.4–4.9)
RBC # BLD AUTO: 2.98 M/UL (ref 4.2–5.4)
SODIUM SERPL-SCNC: 136 MEQ/L (ref 135–144)
WBC # BLD AUTO: 8 K/UL (ref 4.8–10.8)

## 2023-10-13 PROCEDURE — 2580000003 HC RX 258: Performed by: NURSE PRACTITIONER

## 2023-10-13 PROCEDURE — 2580000003 HC RX 258: Performed by: INTERNAL MEDICINE

## 2023-10-13 PROCEDURE — 1210000000 HC MED SURG R&B

## 2023-10-13 PROCEDURE — 94761 N-INVAS EAR/PLS OXIMETRY MLT: CPT

## 2023-10-13 PROCEDURE — 6360000002 HC RX W HCPCS: Performed by: INTERNAL MEDICINE

## 2023-10-13 PROCEDURE — 80048 BASIC METABOLIC PNL TOTAL CA: CPT

## 2023-10-13 PROCEDURE — 94640 AIRWAY INHALATION TREATMENT: CPT

## 2023-10-13 PROCEDURE — 85025 COMPLETE CBC W/AUTO DIFF WBC: CPT

## 2023-10-13 PROCEDURE — 2700000000 HC OXYGEN THERAPY PER DAY

## 2023-10-13 PROCEDURE — 99233 SBSQ HOSP IP/OBS HIGH 50: CPT | Performed by: NURSE PRACTITIONER

## 2023-10-13 PROCEDURE — 99231 SBSQ HOSP IP/OBS SF/LOW 25: CPT | Performed by: PHYSICIAN ASSISTANT

## 2023-10-13 PROCEDURE — 99231 SBSQ HOSP IP/OBS SF/LOW 25: CPT | Performed by: PSYCHIATRY & NEUROLOGY

## 2023-10-13 PROCEDURE — 93010 ELECTROCARDIOGRAM REPORT: CPT | Performed by: INTERNAL MEDICINE

## 2023-10-13 PROCEDURE — 6360000002 HC RX W HCPCS: Performed by: NURSE PRACTITIONER

## 2023-10-13 PROCEDURE — 36415 COLL VENOUS BLD VENIPUNCTURE: CPT

## 2023-10-13 PROCEDURE — 6370000000 HC RX 637 (ALT 250 FOR IP): Performed by: INTERNAL MEDICINE

## 2023-10-13 RX ORDER — IPRATROPIUM BROMIDE AND ALBUTEROL SULFATE 2.5; .5 MG/3ML; MG/3ML
3 SOLUTION RESPIRATORY (INHALATION)
Qty: 360 ML | Refills: 1 | Status: SHIPPED | OUTPATIENT
Start: 2023-10-13

## 2023-10-13 RX ADMIN — IPRATROPIUM BROMIDE AND ALBUTEROL SULFATE 1 DOSE: 2.5; .5 SOLUTION RESPIRATORY (INHALATION) at 20:41

## 2023-10-13 RX ADMIN — IPRATROPIUM BROMIDE AND ALBUTEROL SULFATE 1 DOSE: 2.5; .5 SOLUTION RESPIRATORY (INHALATION) at 07:22

## 2023-10-13 RX ADMIN — HEPARIN SODIUM 5000 UNITS: 5000 INJECTION INTRAVENOUS; SUBCUTANEOUS at 00:14

## 2023-10-13 RX ADMIN — Medication 10 ML: at 22:08

## 2023-10-13 RX ADMIN — PIPERACILLIN AND TAZOBACTAM 3375 MG: 3; .375 INJECTION, POWDER, LYOPHILIZED, FOR SOLUTION INTRAVENOUS at 00:25

## 2023-10-13 ASSESSMENT — PAIN SCALES - WONG BAKER
WONGBAKER_NUMERICALRESPONSE: 0
WONGBAKER_NUMERICALRESPONSE: 0

## 2023-10-13 ASSESSMENT — PAIN SCALES - GENERAL: PAINLEVEL_OUTOF10: 0

## 2023-10-13 ASSESSMENT — ENCOUNTER SYMPTOMS
COLOR CHANGE: 0
VOMITING: 0
NAUSEA: 0
WHEEZING: 0
COUGH: 0
TROUBLE SWALLOWING: 0

## 2023-10-13 NOTE — CARE COORDINATION
SERGEY spoke with Pretty Markham from [facility] who said she is meeting with patient's family tonight to sign consents. [facility] ambulance to [facility] scheduled for tomorrow morning at 11:00. Nurse, facility, patient's daughter, and hospice notified.

## 2023-10-13 NOTE — PROGRESS NOTES
hospice. Patient's daughter wanting to find a new facility for placement. Patient's current CODE STATUS is [facility]. Will order haldol 0.5 mg q6h prn for agitation. Palliative care will continue to follow to help with goals of care and symptom management. Discussed with Dr. Kourtney Ruiz,  and nurse. 10/12/23    Continue with current plan of care. Continue Haldol as needed. Patient's daughter to decide on hospice tomorrow. Palliative care will continue to follow    10/13/23  Patients family ready to sign on with hospice. Palliative care stayed after meeting and answered any questions or concerns.  updated hospice center. Palliative care will sign off at this time.       -Patient is currently being treated for multiple medical conditions by other providers  Hyponatremia  AC on CKD  Elevated troponin  Shortness of breath  DM 2 with hypoglycemia  Hypotension  Right-sided pneumonia  Nephrotic syndrome  History hypertension  Urinary retention  Depression  Anemia  History of TIA    MDM: High    Electronically signed by MADAI William CNP on 10/13/2023 at 12:02 PM    Collaborating physician: Dr. Liz Junior     Please note this report is partially produced by using speech recognition hardware. It may contain errors related to the system, including grammar, punctuation and spelling as well as words and phrases that may seem inaccurate. For any questions or concerns feel free to contact me for clarification. Thanks for the opportunity you have allowed us to provide palliative care to [facility]. We will be in touch as care progresses. Please feel free to reach out to us should you have any questions or requests.

## 2023-10-13 NOTE — CARE COORDINATION
Patient has been accepted to [facility] for long term care. Awaiting family decision regarding hospice. Informed by physician and pall care nurse that family is ready to proceed with hospice. [address] notified so they can re-meet with family.

## 2023-10-13 NOTE — DISCHARGE SUMMARY
Discharge Summary    Date: 10/13/2023  Patient Name: Luz Augustin    YOB: 1933     Age: 80 y.o. Admit Date: 10/7/2023  Discharge Date: 10/13/2023  Discharge Condition: Poor    Admission Diagnosis  Hyponatremia [E87.1]; Retention of urine [R33.9]; Elevated troponin [R79.89]; AC (acute kidney injury) ([address] [N17.9]; Pneumonia of both lower lobes due to infectious organism [J18.9]; New onset of congestive heart failure ([address]) [I50.9]      Discharge Diagnosis  Principal Problem:    Hyponatremia  Active Problems:    Urinary retention    Poorly controlled diabetes mellitus (HCC)    Pneumonia of both lower lobes due to infectious organism    Encounter for hospice care discussion    Agitation  Resolved Problems:    * No resolved hospital problems. [facility] Stay  Narrative of Hospital Course:  Patient 19-year-old female comes with altered mental status found to have worsening renal function and hyponatremia and possible pneumonia. Patient received IV antibiotics with/out  improvement of symptoms. Eval by neurology, renal, palliative care and urology. Patient had acute urinary retention and Mcintosh was placed. Family decided on comfort care and hospice care. Patient was discharged to nursing home with hospice team.    Consultants:  IP CONSULT TO NEPHROLOGY  IP CONSULT TO HOSPICE  IP CONSULT TO NEUROLOGY  IP CONSULT TO PALLIATIVE CARE  IP CONSULT TO ENDOCRINOLOGY  IP CONSULT TO UROLOGY    Surgeries/procedures Performed:      Treatments:            Discharge Plan/Disposition:  Home    Hospital/Incidental Findings Requiring Follow Up:    Patient Instructions:    Diet:    Activity:  For number of days (if applicable): Other Instructions:    Provider Follow-Up:   No follow-ups on file. Significant Diagnostic Studies:    Recent Labs:  Admission on 10/07/2023  No results displayed because visit has over 200 results.     ------------    Radiology last 7 days:  CT CHEST WO CONTRAST    Result Date: Indications: Benign Enlargement of Prostate          Current Discharge Medication List    STOP taking these medications    insulin lispro (HUMALOG) 100 UNIT/ML SOLN injection vial  Comments:  Reason for Stopping:    hydrALAZINE (APRESOLINE) 25 MG tablet  Comments:  Reason for Stopping:    amLODIPine (NORVASC) 10 MG tablet  Comments:  Reason for Stopping:    insulin lispro (HUMALOG) 100 UNIT/ML SOLN injection vial  Comments:  Reason for Stopping:    nitrofurantoin, macrocrystal-monohydrate, (MACROBID) 100 MG capsule  Comments:  Reason for Stopping:    losartan (COZAAR) 50 MG tablet  Comments:  Reason for Stopping:    DULoxetine (CYMBALTA) 20 MG extended release capsule  Comments:  Reason for Stopping:          Time Spent on Discharge:  minutes were spent in patient examination, evaluation, counseling as well as medication reconciliation, prescriptions for required medications, discharge plan, and follow up.     Electronically signed by Estela Gaitan MD on 10/13/23 at 12:35 PM EDT   Overtime on dc summary was 45 min

## 2023-10-13 NOTE — CONSULTS
[facility] RN went to pt's daughter's home and obtained consents for Hospice care upon discharge to [address] [place] [address].

## 2023-10-13 NOTE — PROGRESS NOTES
10/13/23 0900   RT Protocol   History Pulmonary Disease 0   Respiratory pattern 0   Breath sounds 6   Cough 0   Indications for Bronchodilator Therapy Decreased or absent breath sounds   Bronchodilator Assessment Score 6

## 2023-10-13 NOTE — PROGRESS NOTES
Subjective:      Patient ID: Sarita Biggs is a 80 y.o. female    HPI80 year old female who's wu catheter is draining clear yellow urine       Past Medical History:   Diagnosis Date    Back pain 07/17/2009    Balance problems 05/16/2020    Constipation 08/03/2022    Dysphagia 08/03/2022    Falls 08/03/2022    Hyperlipidemia     Hypertension     Incomplete bladder emptying 04/28/2009    Neuropathy     Piriformis syndrome 08/03/2022    Stage 3 chronic kidney disease ([address]) 08/2022    Type 2 diabetes mellitus without complication (HCC)     UTI (urinary tract infection) 08/30/2022    Varicose veins of both lower extremities 05/16/2020     History reviewed. No pertinent surgical history. Social History     Socioeconomic History    Marital status: Unknown     Spouse name: None    Number of children: None    Years of education: None    Highest education level: None   Tobacco Use    Smoking status: Unknown   Vaping Use    Vaping Use: Never used   Substance and Sexual Activity    Alcohol use: Never    Drug use: Never   Social History Narrative    Lives With: Alone, dtr [address] [place] [address] lives nearby    Type of Home: House in [address] [place] [address] Dr: Able to Live on Main level with bedroom/bathroom, Two level    Home Access: Stairs to enter with rails- Number of Steps: 2- Rails: Both    Bathroom Shower/Tub: Walk-in shower    Bathroom Toilet: Standard-Equipment: Shower chair, Hand-held shower, Grab bars in shower-Accessibility: Walker accessible    Home Equipment: Elyce [place], rolling, Rollator (multiple ww within the house - rollator for out of house)    Receives Help From: Family    ADL Assistance: Independent, [facility]: Needs assistance (can make light sandwiches)-Responsibilities: Yes    Other (Comment): daughter cooks, transports and shops.  for chores.     Ambulation Assistance: Independent (primarily using ww or using rails within home - occasionally with no device)    Transfer Assistance:

## 2023-10-14 VITALS
TEMPERATURE: 97.9 F | DIASTOLIC BLOOD PRESSURE: 74 MMHG | SYSTOLIC BLOOD PRESSURE: 172 MMHG | BODY MASS INDEX: 25.46 KG/M2 | HEIGHT: 65 IN | WEIGHT: 152.8 LBS | HEART RATE: 85 BPM | RESPIRATION RATE: 16 BRPM | OXYGEN SATURATION: 96 %

## 2023-10-14 LAB
ANION GAP SERPL CALCULATED.3IONS-SCNC: 15 MEQ/L (ref 9–15)
BASOPHILS # BLD: 0 K/UL (ref 0–0.2)
BASOPHILS NFR BLD: 0.1 %
BUN SERPL-MCNC: 44 MG/DL (ref 8–23)
CALCIUM SERPL-MCNC: 9.2 MG/DL (ref 8.5–9.9)
CHLORIDE SERPL-SCNC: 109 MEQ/L (ref 95–107)
CO2 SERPL-SCNC: 19 MEQ/L (ref 20–31)
CREAT SERPL-MCNC: 3.29 MG/DL (ref 0.5–0.9)
EOSINOPHIL # BLD: 0.1 K/UL (ref 0–0.7)
EOSINOPHIL NFR BLD: 0.9 %
ERYTHROCYTE [DISTWIDTH] IN BLOOD BY AUTOMATED COUNT: 14 % (ref 11.5–14.5)
GLUCOSE BLD-MCNC: 141 MG/DL (ref 70–99)
GLUCOSE BLD-MCNC: 181 MG/DL (ref 70–99)
GLUCOSE SERPL-MCNC: 130 MG/DL (ref 70–99)
HCT VFR BLD AUTO: 24.6 % (ref 37–47)
HGB BLD-MCNC: 8.1 G/DL (ref 12–16)
LYMPHOCYTES # BLD: 0.7 K/UL (ref 1–4.8)
LYMPHOCYTES NFR BLD: 10.5 %
MCH RBC QN AUTO: 29.1 PG (ref 27–31.3)
MCHC RBC AUTO-ENTMCNC: 32.9 % (ref 33–37)
MCV RBC AUTO: 88.5 FL (ref 79.4–94.8)
MONOCYTES # BLD: 0.5 K/UL (ref 0.2–0.8)
MONOCYTES NFR BLD: 7.1 %
NEUTROPHILS # BLD: 5.5 K/UL (ref 1.4–6.5)
NEUTS SEG NFR BLD: 80.1 %
PERFORMED ON: ABNORMAL
PERFORMED ON: ABNORMAL
PLATELET # BLD AUTO: 233 K/UL (ref 130–400)
POTASSIUM SERPL-SCNC: 4.6 MEQ/L (ref 3.4–4.9)
RBC # BLD AUTO: 2.78 M/UL (ref 4.2–5.4)
SODIUM SERPL-SCNC: 143 MEQ/L (ref 135–144)
WBC # BLD AUTO: 6.9 K/UL (ref 4.8–10.8)

## 2023-10-14 PROCEDURE — 94761 N-INVAS EAR/PLS OXIMETRY MLT: CPT

## 2023-10-14 PROCEDURE — 2700000000 HC OXYGEN THERAPY PER DAY

## 2023-10-14 PROCEDURE — 85025 COMPLETE CBC W/AUTO DIFF WBC: CPT

## 2023-10-14 PROCEDURE — 80048 BASIC METABOLIC PNL TOTAL CA: CPT

## 2023-10-14 PROCEDURE — 6370000000 HC RX 637 (ALT 250 FOR IP): Performed by: INTERNAL MEDICINE

## 2023-10-14 PROCEDURE — 2580000003 HC RX 258: Performed by: INTERNAL MEDICINE

## 2023-10-14 PROCEDURE — 6360000002 HC RX W HCPCS: Performed by: NURSE PRACTITIONER

## 2023-10-14 PROCEDURE — 2580000003 HC RX 258: Performed by: NURSE PRACTITIONER

## 2023-10-14 PROCEDURE — 94640 AIRWAY INHALATION TREATMENT: CPT

## 2023-10-14 PROCEDURE — 36415 COLL VENOUS BLD VENIPUNCTURE: CPT

## 2023-10-14 PROCEDURE — 6360000002 HC RX W HCPCS: Performed by: INTERNAL MEDICINE

## 2023-10-14 RX ORDER — IPRATROPIUM BROMIDE AND ALBUTEROL SULFATE 2.5; .5 MG/3ML; MG/3ML
1 SOLUTION RESPIRATORY (INHALATION) EVERY 4 HOURS PRN
Status: DISCONTINUED | OUTPATIENT
Start: 2023-10-14 | End: 2023-10-14 | Stop reason: HOSPADM

## 2023-10-14 RX ADMIN — HEPARIN SODIUM 5000 UNITS: 5000 INJECTION INTRAVENOUS; SUBCUTANEOUS at 08:53

## 2023-10-14 RX ADMIN — ASPIRIN 81 MG CHEWABLE TABLET 81 MG: 81 TABLET CHEWABLE at 08:51

## 2023-10-14 RX ADMIN — PIPERACILLIN AND TAZOBACTAM 3375 MG: 3; .375 INJECTION, POWDER, LYOPHILIZED, FOR SOLUTION INTRAVENOUS at 00:38

## 2023-10-14 RX ADMIN — Medication 10 ML: at 09:00

## 2023-10-14 RX ADMIN — HEPARIN SODIUM 5000 UNITS: 5000 INJECTION INTRAVENOUS; SUBCUTANEOUS at 00:36

## 2023-10-14 RX ADMIN — IPRATROPIUM BROMIDE AND ALBUTEROL SULFATE 1 DOSE: 2.5; .5 SOLUTION RESPIRATORY (INHALATION) at 07:42

## 2023-10-14 ASSESSMENT — PAIN SCALES - WONG BAKER
WONGBAKER_NUMERICALRESPONSE: 0

## 2023-10-14 ASSESSMENT — PAIN SCALES - GENERAL
PAINLEVEL_OUTOF10: 0

## 2023-10-14 NOTE — DISCHARGE INSTR - DIET
Good nutrition is important when healing from an illness, injury, or surgery. Follow any nutrition recommendations given to you during your hospital stay. If you were given an oral nutrition supplement while in the hospital, continue to take this supplement at home. You can take it with meals, in-between meals, and/or before bedtime. These supplements can be purchased at most local grocery stores, pharmacies, and chain [employer]-stores. If you have any questions about your diet or nutrition, call the hospital and ask for the dietitian.       Dysphagia diet- minced and moist

## 2023-10-14 NOTE — DISCHARGE SUMMARY
Discharge Summary    Date: 10/16/2023  Patient Name: Ambrose Bailey    YOB: 1933     Age: 80 y.o. Admit Date: 10/7/2023  Discharge Date: 10/14/2023  Discharge Condition: Poor    Admission Diagnosis  Hyponatremia [E87.1]; Retention of urine [R33.9]; Elevated troponin [R79.89]; AC (acute kidney injury) ([address] ) [N17.9]; Pneumonia of both lower lobes due to infectious organism [J18.9]; New onset of congestive heart failure ([address]) [I50.9]      Discharge Diagnosis  Principal Problem:    Hyponatremia  Active Problems:    Urinary retention    Poorly controlled diabetes mellitus (HCC)    Pneumonia of both lower lobes due to infectious organism    Encounter for hospice care discussion    Agitation  Resolved Problems:    * No resolved hospital problems. [facility] Stay  Narrative of Hospital Course:  Patient 27-year-old female comes with altered mental status found to have worsening renal function and hyponatremia and possible pneumonia. Patient received IV antibiotics with/out  improvement of symptoms. Eval by neurology, renal, palliative care and urology. Patient had acute urinary retention and Mcintosh was placed. Family decided on comfort care and hospice care. Patient was discharged to nursing home with hospice team.    Consultants:  IP CONSULT TO NEPHROLOGY  IP CONSULT TO HOSPICE  IP CONSULT TO NEUROLOGY  IP CONSULT TO PALLIATIVE CARE  IP CONSULT TO ENDOCRINOLOGY  IP CONSULT TO UROLOGY    Surgeries/procedures Performed:      Treatments:            Discharge Plan/Disposition:  Home    Hospital/Incidental Findings Requiring Follow Up:    Patient Instructions:    Diet:    Activity:Activity as Tolerated  For number of days (if applicable): Other Instructions:    Provider Follow-Up:   No follow-ups on file. Significant Diagnostic Studies:    Recent Labs:  Admission on 10/07/2023, Discharged on 10/14/2023  No results displayed because visit has over 200 results.     ------------    Radiology last 7 Indications: Benign Enlargement of Prostate          Current Discharge Medication List    STOP taking these medications    insulin lispro (HUMALOG) 100 UNIT/ML SOLN injection vial  Comments:  Reason for Stopping:    hydrALAZINE (APRESOLINE) 25 MG tablet  Comments:  Reason for Stopping:    amLODIPine (NORVASC) 10 MG tablet  Comments:  Reason for Stopping:    insulin lispro (HUMALOG) 100 UNIT/ML SOLN injection vial  Comments:  Reason for Stopping:    nitrofurantoin, macrocrystal-monohydrate, (MACROBID) 100 MG capsule  Comments:  Reason for Stopping:    losartan (COZAAR) 50 MG tablet  Comments:  Reason for Stopping:    DULoxetine (CYMBALTA) 20 MG extended release capsule  Comments:  Reason for Stopping:          Time Spent on Discharge:  minutes were spent in patient examination, evaluation, counseling as well as medication reconciliation, prescriptions for required medications, discharge plan, and follow up.     Electronically signed by Shiva Rodríguez MD on 10/14/23 at 11:39 AM EDT   Overtime on dc summary was 45 min

## 2023-10-14 NOTE — DISCHARGE INSTR - COC
Continuity of Care Form    Patient Name: James Camilo   :  3/6/1933  MRN:  [id number]    Admit date:  10/7/2023  Discharge date:  10-    Code Status Order: [facility]   Advance Directives:     Admitting Physician: Eliza Rosenbaum MD  PCP: Lyudmila Valentin MD    Discharging Nurse: Sheyla Unit/Room#: C909/Q408-71  Discharging Unit Phone Number: [phone]    Emergency Contact:   Extended Emergency Contact Information  Primary Emergency Contact: Sultana Ceballos  Home Phone: [phone]  Relation: Child  Secondary Emergency Contact: Shane Ceballos  Home Phone: [phone]  Relation: Son-in-Law    Past Surgical History:  History reviewed. No pertinent surgical history.     Immunization History:   Immunization History   Administered Date(s) Administered    COVID-19, [place] Vaccine, Vaccine Unspecified 2021, 2021, 10/09/2021, 2022       Active Problems:  Patient Active Problem List   Diagnosis Code    Acute CVA (cerebrovascular accident) ([address]) I63.9    Altered mental status R41.82    Dysphagia, unspecified R13.10    Congenital cystic kidney disease Q61.9    Bursitis of hip M70.70    Benign essential hypertension I10    Backache M54.9    Gastro-esophageal reflux disease with esophagitis K21.00    Urinary retention R33.9    Impairment of balance R26.89    Palpitations R00.2    Piriformis syndrome G57.00    Urinary incontinence R32    Vascular disorder of extremity (HCC) I73.9    Impaired mobility and activities of daily living Z74.09, Z78.9    Advanced care planning/counseling discussion Z71.89    Goals of care, counseling/discussion Z71.89    Palliative care encounter Z51.5    Neurogenic bladder N31.9    History of recent fall Z91.81    Cardiac arrhythmia I49.9    AC (acute kidney injury) ([address]) N17.9    Hyponatremia E87.1    Poorly controlled diabetes mellitus ([address]) E11.65    Pneumonia of both lower lobes due to infectious organism J18.9    Encounter for hospice care discussion

## 2023-10-14 NOTE — DISCHARGE SUMMARY
Pt discharged at [id number] [phone] [facility] to [facility].

## 2023-10-14 NOTE — PROGRESS NOTES
10/13/23 2200   RT Protocol   History Pulmonary Disease 0   Respiratory pattern 0   Breath sounds 2   Cough 0   Indications for Bronchodilator Therapy Decreased or absent breath sounds   Bronchodilator Assessment Score 2

## 2023-10-14 NOTE — PROGRESS NOTES
10/14/23 1000   RT Protocol   History Pulmonary Disease 0   Respiratory pattern 0   Breath sounds 2   Cough 0   Indications for Bronchodilator Therapy Decreased or absent breath sounds   Bronchodilator Assessment Score 2

## 2023-10-15 ENCOUNTER — NURSING HOME VISIT (OUTPATIENT)
Dept: POST ACUTE CARE | Facility: EXTERNAL LOCATION | Age: 88
End: 2023-10-15
Payer: MEDICARE

## 2023-10-15 VITALS — SYSTOLIC BLOOD PRESSURE: 90 MMHG | HEART RATE: 77 BPM | DIASTOLIC BLOOD PRESSURE: 68 MMHG

## 2023-10-15 DIAGNOSIS — E11.22 TYPE 2 DIABETES MELLITUS WITH CHRONIC KIDNEY DISEASE ON CHRONIC DIALYSIS, WITHOUT LONG-TERM CURRENT USE OF INSULIN (MULTI): ICD-10-CM

## 2023-10-15 DIAGNOSIS — R53.83 LETHARGY: ICD-10-CM

## 2023-10-15 DIAGNOSIS — Z99.2 TYPE 2 DIABETES MELLITUS WITH CHRONIC KIDNEY DISEASE ON CHRONIC DIALYSIS, WITHOUT LONG-TERM CURRENT USE OF INSULIN (MULTI): ICD-10-CM

## 2023-10-15 DIAGNOSIS — N17.9 ACUTE RENAL FAILURE SUPERIMPOSED ON STAGE 4 CHRONIC KIDNEY DISEASE, UNSPECIFIED ACUTE RENAL FAILURE TYPE (MULTI): ICD-10-CM

## 2023-10-15 DIAGNOSIS — N30.00 ACUTE CYSTITIS WITHOUT HEMATURIA: ICD-10-CM

## 2023-10-15 DIAGNOSIS — R63.0 ANOREXIA: ICD-10-CM

## 2023-10-15 DIAGNOSIS — N18.4 ACUTE RENAL FAILURE SUPERIMPOSED ON STAGE 4 CHRONIC KIDNEY DISEASE, UNSPECIFIED ACUTE RENAL FAILURE TYPE (MULTI): ICD-10-CM

## 2023-10-15 DIAGNOSIS — N18.6 TYPE 2 DIABETES MELLITUS WITH CHRONIC KIDNEY DISEASE ON CHRONIC DIALYSIS, WITHOUT LONG-TERM CURRENT USE OF INSULIN (MULTI): ICD-10-CM

## 2023-10-15 DIAGNOSIS — E87.1 HYPONATREMIA: Primary | ICD-10-CM

## 2023-10-15 DIAGNOSIS — I10 BENIGN ESSENTIAL HYPERTENSION: ICD-10-CM

## 2023-10-15 DIAGNOSIS — R26.89 BALANCE PROBLEMS: ICD-10-CM

## 2023-10-15 PROCEDURE — 99306 1ST NF CARE HIGH MDM 50: CPT | Performed by: INTERNAL MEDICINE

## 2023-10-15 ASSESSMENT — ENCOUNTER SYMPTOMS
CHOKING: 1
NAUSEA: 1
DIZZINESS: 1
APPETITE CHANGE: 1
CONFUSION: 1
WOUND: 1
UNEXPECTED WEIGHT CHANGE: 1
VOMITING: 0
WEAKNESS: 1
CONSTIPATION: 0
DIARRHEA: 0
SHORTNESS OF BREATH: 0
NUMBNESS: 1
FATIGUE: 1
ARTHRALGIAS: 1
ACTIVITY CHANGE: 1
COUGH: 1

## 2023-10-15 NOTE — LETTER
Patient: Kamila Agarwal  : 3/6/1933    Encounter Date: 10/15/2023    Subjective  Patient ID: Kamila Agarwal is a 89 y.o. female who is acute skilled care and presents for initial visit for skilled nursing.    89-year-old female patient was in her usual state of health, she was just seen by me after having couple of falls, at that time we thought that patient could require assisted living setting, at that time patient was accompanied by her daughter.  Then after that she felt and then she has had 2 admissions, both admissions were done at [facility], last admission was quite bad that patient got worse, patient was hyponatremic, patient was having progressive renal impairment, normally her creatinine stays around 1.6-1.9 but creatinine went up to 3.4, patient's appetite remains poor, patient's kidney functions were worsening, because of back-and-forth hospitalization patient was confused, disoriented, patient's daughter called me last week so I communicated with her, [facility] was forcing patient to go to hospice, patient's daughter was quite concerned about it, at that time I communicated with hospitalist and told them that normally patient is not into that much of bad condition but back-and-forth hospitalization she did not help this patient to recover and last sodium was 127 and last creatinine was 3.7 so things were not looking well.  So I see that patient came here under hospice care now, when I see this patient she was lethargic, she would not recognize me, she has not ate, she has become quite significantly moribund.  Patient has history of longstanding coronary artery disease, significant her urinary incontinence, frequent cystitis, balance problem, frequent falls, diabetic neuropathy and also more or less stable diabetes for all this years leading to neuropathy and perhaps nephropathy but now patient's condition looks really bad, patient is having DNR CC, after I saw this patient I called  patient's daughter and communicated with her about the patient's assessment and condition and hospitalization data and information were reviewed.         Review of Systems   Constitutional:  Positive for activity change, appetite change, fatigue and unexpected weight change.   HENT:  Positive for hearing loss. Negative for drooling.    Respiratory:  Positive for cough and choking. Negative for shortness of breath.    Cardiovascular:  Negative for chest pain and leg swelling.   Gastrointestinal:  Positive for nausea. Negative for constipation, diarrhea and vomiting.   Musculoskeletal:  Positive for arthralgias and gait problem.   Skin:  Positive for wound.   Neurological:  Positive for dizziness, weakness and numbness.   Psychiatric/Behavioral:  Positive for confusion.        Objective  BP 90/68   Pulse 77     Physical Exam  Constitutional:       Appearance: She is ill-appearing.   HENT:      Head: Normocephalic.   Eyes:      Conjunctiva/sclera: Conjunctivae normal.   Cardiovascular:      Rate and Rhythm: Normal rate and regular rhythm.      Heart sounds: Normal heart sounds.   Pulmonary:      Breath sounds: Rales present.   Abdominal:      General: Abdomen is flat.      Palpations: Abdomen is soft.   Musculoskeletal:         General: Tenderness present.      Cervical back: Neck supple.   Skin:     General: Skin is warm and dry.   Neurological:      Mental Status: She is lethargic.   Psychiatric:         Attention and Perception: She is inattentive.         Mood and Affect: Mood normal.         Cognition and Memory: Cognition is impaired.         Assessment/Plan  Problem List Items Addressed This Visit             ICD-10-CM    Balance problems R26.89    Benign essential hypertension I10    Urinary tract infection N39.0    Type 2 diabetes mellitus with chronic kidney disease on chronic dialysis, without long-term current use of insulin (CMS/[facility]) E11.22, N18.6, Z99.2     Other Visit Diagnoses         Codes     Hyponatremia    -  Primary E87.1    Acute renal failure superimposed on stage 4 chronic kidney disease, unspecified acute renal failure type (CMS/[facility])     N17.9, N18.4    Lethargy     R53.83    Anorexia     R63.0        Patient is not looking well, I would say reluctantly that patient is appropriate for hospice, do not think that patient will be getting back to her baseline status.  Sodium level was 127 it will be checked and it will not improve, creatinine is 3.7 it will not improve, she used to be hypertensive but not anymore, she has a profound anorexia, she is lethargic, she will not be able to ambulate, previously she has had multiple episodes of cystitis now she has a Salter catheter.  She is not on any insulin therapy, blood sugar can be monitored once a day.  Patient's medications has been reduced to aspirin, nebulizer, amlodipine, tamsulosin.  We will discontinue oxybutynin and tamsulosin because patient has a Salter catheter.  Told daughter that patient's prognosis is not good and do not think that patient will get back to her or will go back to home.  Hospice is appropriate, skilled nursing can be attempted, discussed with nursing staff, periodic assessment and follow-up will be done.  Patient can be assisted with the feeding but may or may not work.  Poor prognosis.     Goals    None           Electronically Signed By: Markus Bradshaw MD   10/15/23  4:59 PM

## 2023-10-15 NOTE — PROGRESS NOTES
Subjective   Patient ID: Kamila Agarwal is a 89 y.o. female who is acute skilled care and presents for initial visit for skilled nursing.    89-year-old female patient was in her usual state of health, she was just seen by me after having couple of falls, at that time we thought that patient could require assisted living setting, at that time patient was accompanied by her daughter.  Then after that she felt and then she has had 2 admissions, both admissions were done at [facility], last admission was quite bad that patient got worse, patient was hyponatremic, patient was having progressive renal impairment, normally her creatinine stays around 1.6-1.9 but creatinine went up to 3.4, patient's appetite remains poor, patient's kidney functions were worsening, because of back-and-forth hospitalization patient was confused, disoriented, patient's daughter called me last week so I communicated with her, [facility] was forcing patient to go to hospice, patient's daughter was quite concerned about it, at that time I communicated with hospitalist and told them that normally patient is not into that much of bad condition but back-and-forth hospitalization she did not help this patient to recover and last sodium was 127 and last creatinine was 3.7 so things were not looking well.  So I see that patient came here under hospice care now, when I see this patient she was lethargic, she would not recognize me, she has not ate, she has become quite significantly moribund.  Patient has history of longstanding coronary artery disease, significant her urinary incontinence, frequent cystitis, balance problem, frequent falls, diabetic neuropathy and also more or less stable diabetes for all this years leading to neuropathy and perhaps nephropathy but now patient's condition looks really bad, patient is having DNR CC, after I saw this patient I called patient's daughter and communicated with her about the patient's assessment  and condition and hospitalization data and information were reviewed.         Review of Systems   Constitutional:  Positive for activity change, appetite change, fatigue and unexpected weight change.   HENT:  Positive for hearing loss. Negative for drooling.    Respiratory:  Positive for cough and choking. Negative for shortness of breath.    Cardiovascular:  Negative for chest pain and leg swelling.   Gastrointestinal:  Positive for nausea. Negative for constipation, diarrhea and vomiting.   Musculoskeletal:  Positive for arthralgias and gait problem.   Skin:  Positive for wound.   Neurological:  Positive for dizziness, weakness and numbness.   Psychiatric/Behavioral:  Positive for confusion.        Objective   BP 90/68   Pulse 77     Physical Exam  Constitutional:       Appearance: She is ill-appearing.   HENT:      Head: Normocephalic.   Eyes:      Conjunctiva/sclera: Conjunctivae normal.   Cardiovascular:      Rate and Rhythm: Normal rate and regular rhythm.      Heart sounds: Normal heart sounds.   Pulmonary:      Breath sounds: Rales present.   Abdominal:      General: Abdomen is flat.      Palpations: Abdomen is soft.   Musculoskeletal:         General: Tenderness present.      Cervical back: Neck supple.   Skin:     General: Skin is warm and dry.   Neurological:      Mental Status: She is lethargic.   Psychiatric:         Attention and Perception: She is inattentive.         Mood and Affect: Mood normal.         Cognition and Memory: Cognition is impaired.         Assessment/Plan   Problem List Items Addressed This Visit             ICD-10-CM    Balance problems R26.89    Benign essential hypertension I10    Urinary tract infection N39.0    Type 2 diabetes mellitus with chronic kidney disease on chronic dialysis, without long-term current use of insulin (CMS/[facility]) E11.22, N18.6, Z99.2     Other Visit Diagnoses         Codes    Hyponatremia    -  Primary E87.1    Acute renal failure superimposed on stage 4  chronic kidney disease, unspecified acute renal failure type (CMS/[facility])     N17.9, N18.4    Lethargy     R53.83    Anorexia     R63.0        Patient is not looking well, I would say reluctantly that patient is appropriate for hospice, do not think that patient will be getting back to her baseline status.  Sodium level was 127 it will be checked and it will not improve, creatinine is 3.7 it will not improve, she used to be hypertensive but not anymore, she has a profound anorexia, she is lethargic, she will not be able to ambulate, previously she has had multiple episodes of cystitis now she has a Salter catheter.  She is not on any insulin therapy, blood sugar can be monitored once a day.  Patient's medications has been reduced to aspirin, nebulizer, amlodipine, tamsulosin.  We will discontinue oxybutynin and tamsulosin because patient has a Salter catheter.  Told daughter that patient's prognosis is not good and do not think that patient will get back to her or will go back to home.  Hospice is appropriate, skilled nursing can be attempted, discussed with nursing staff, periodic assessment and follow-up will be done.  Patient can be assisted with the feeding but may or may not work.  Poor prognosis.     Goals    None

## 2023-10-15 NOTE — PROGRESS NOTES
Physical Therapy  Facility/Department: [facility] MED SURG U505/C392-10  Physical Therapy Discharge      NAME: Sarita Biggs    : 3/6/1933 (80 y.o.)  MRN: [id number]    Account: [de-identified]  Gender: female      Patient has been discharged from acute care hospital. DC patient from current PT program.      Electronically signed by Shelley Isabel PT on 10/15/23 at 12:34 PM EDT

## 2023-10-19 ENCOUNTER — NURSING HOME VISIT (OUTPATIENT)
Dept: POST ACUTE CARE | Facility: EXTERNAL LOCATION | Age: 88
End: 2023-10-19
Payer: MEDICARE

## 2023-10-19 VITALS — SYSTOLIC BLOOD PRESSURE: 100 MMHG | HEART RATE: 87 BPM | DIASTOLIC BLOOD PRESSURE: 68 MMHG

## 2023-10-19 DIAGNOSIS — E87.1 HYPONATREMIA: Primary | ICD-10-CM

## 2023-10-19 DIAGNOSIS — E11.22 TYPE 2 DIABETES MELLITUS WITH CHRONIC KIDNEY DISEASE ON CHRONIC DIALYSIS, WITHOUT LONG-TERM CURRENT USE OF INSULIN (MULTI): ICD-10-CM

## 2023-10-19 DIAGNOSIS — Z99.2 TYPE 2 DIABETES MELLITUS WITH CHRONIC KIDNEY DISEASE ON CHRONIC DIALYSIS, WITHOUT LONG-TERM CURRENT USE OF INSULIN (MULTI): ICD-10-CM

## 2023-10-19 DIAGNOSIS — R63.0 ANOREXIA: ICD-10-CM

## 2023-10-19 DIAGNOSIS — N18.6 TYPE 2 DIABETES MELLITUS WITH CHRONIC KIDNEY DISEASE ON CHRONIC DIALYSIS, WITHOUT LONG-TERM CURRENT USE OF INSULIN (MULTI): ICD-10-CM

## 2023-10-19 DIAGNOSIS — N18.4 STAGE 4 CHRONIC KIDNEY DISEASE (MULTI): ICD-10-CM

## 2023-10-19 PROCEDURE — 99308 SBSQ NF CARE LOW MDM 20: CPT | Performed by: INTERNAL MEDICINE

## 2023-10-19 ASSESSMENT — ENCOUNTER SYMPTOMS
WEAKNESS: 1
CHOKING: 0
ARTHRALGIAS: 1
CARDIOVASCULAR NEGATIVE: 1
FATIGUE: 1
SHORTNESS OF BREATH: 0
DIARRHEA: 0
DIZZINESS: 1
ABDOMINAL DISTENTION: 0
CONSTIPATION: 0
ACTIVITY CHANGE: 1
AGITATION: 0
CONFUSION: 0

## 2023-10-19 NOTE — LETTER
Patient: Kamila Agarwal  : 3/6/1933    Encounter Date: 10/19/2023    Subjective  Patient ID: Kamila gAarwal is a 89 y.o. female who is acute skilled care being seen and evaluated for multiple medical problems.    Patient was seen today for follow-up, slightly improved condition, sodium was 130, creatinine is 2.34, she is able to recognize me, appetite is only 25%, patient remains under hospice care.  After seeing the patient patient's daughter was called and communicated with her about patient's condition.  According to nursing staff more or less patient remains bedbound, at least patient's behavior has improved, delirium has been subsided.  Hemoglobin remains low, blood sugars went up to 500 today morning.  She used to be on Lantus insulin, she has a longstanding history of diabetes.  Overall her condition slightly better but still remains under hospice and still there is a big doctor about this patient's full recovery which is not expected here.  Salter catheter has been showing clear sheldon-colored urine.         Review of Systems   Constitutional:  Positive for activity change and fatigue.   HENT:  Negative for congestion and drooling.    Respiratory:  Negative for choking and shortness of breath.    Cardiovascular: Negative.  Negative for leg swelling.   Gastrointestinal:  Negative for abdominal distention, constipation and diarrhea.   Musculoskeletal:  Positive for arthralgias and gait problem.   Skin: Negative.    Neurological:  Positive for dizziness and weakness.   Psychiatric/Behavioral:  Negative for agitation, behavioral problems and confusion.        Objective  /68   Pulse 87     Physical Exam  Vitals reviewed.   Constitutional:       General: She is not in acute distress.     Appearance: She is normal weight. She is ill-appearing. She is not toxic-appearing.   HENT:      Head: Normocephalic.   Eyes:      Conjunctiva/sclera: Conjunctivae normal.   Cardiovascular:      Rate and Rhythm: Normal  rate and regular rhythm.      Pulses: Normal pulses.   Pulmonary:      Breath sounds: Rales present.   Abdominal:      Palpations: Abdomen is soft.      Comments: Salter in place   Musculoskeletal:         General: Tenderness present.      Cervical back: Normal range of motion.   Skin:     General: Skin is warm and dry.   Neurological:      General: No focal deficit present.      Motor: Weakness present.   Psychiatric:         Cognition and Memory: Cognition is impaired.         Assessment/Plan  Problem List Items Addressed This Visit             ICD-10-CM    Stage 4 chronic kidney disease (CMS/[facility]) N18.4    Type 2 diabetes mellitus with chronic kidney disease on chronic dialysis, without long-term current use of insulin (CMS/[facility]) E11.22, N18.6, Z99.2     Other Visit Diagnoses         Codes    Hyponatremia    -  Primary E87.1    Anorexia     R63.0        Start Lantus insulin at 10 units, anorexia persist, hyperglycemia is excessive.  Patient should be staying under hospice care.  Would not get her out of hospice because still it is not very much clear whether she will improve wholly incompletely.  It is extremely unlikely that patient will be leaving from  facility and living independently.  Still we will continue to monitor patient's labs.  Still nursing staff will try their best to assist this patient for feeding.  BP readings are low, Flomax was discontinued.  We will not remove Salter catheter at this time I may take few more days.  Still I would say that patient's prognosis seems guarded.  Discussed with family.     Goals    None           Electronically Signed By: Markus Bradshaw MD   10/19/23  7:34 PM

## 2023-10-19 NOTE — PROGRESS NOTES
Subjective   Patient ID: Kamila Agarwal is a 89 y.o. female who is acute skilled care being seen and evaluated for multiple medical problems.    Patient was seen today for follow-up, slightly improved condition, sodium was 130, creatinine is 2.34, she is able to recognize me, appetite is only 25%, patient remains under hospice care.  After seeing the patient patient's daughter was called and communicated with her about patient's condition.  According to nursing staff more or less patient remains bedbound, at least patient's behavior has improved, delirium has been subsided.  Hemoglobin remains low, blood sugars went up to 500 today morning.  She used to be on Lantus insulin, she has a longstanding history of diabetes.  Overall her condition slightly better but still remains under hospice and still there is a big doctor about this patient's full recovery which is not expected here.  Salter catheter has been showing clear sheldon-colored urine.         Review of Systems   Constitutional:  Positive for activity change and fatigue.   HENT:  Negative for congestion and drooling.    Respiratory:  Negative for choking and shortness of breath.    Cardiovascular: Negative.  Negative for leg swelling.   Gastrointestinal:  Negative for abdominal distention, constipation and diarrhea.   Musculoskeletal:  Positive for arthralgias and gait problem.   Skin: Negative.    Neurological:  Positive for dizziness and weakness.   Psychiatric/Behavioral:  Negative for agitation, behavioral problems and confusion.        Objective   /68   Pulse 87     Physical Exam  Vitals reviewed.   Constitutional:       General: She is not in acute distress.     Appearance: She is normal weight. She is ill-appearing. She is not toxic-appearing.   HENT:      Head: Normocephalic.   Eyes:      Conjunctiva/sclera: Conjunctivae normal.   Cardiovascular:      Rate and Rhythm: Normal rate and regular rhythm.      Pulses: Normal pulses.   Pulmonary:       Breath sounds: Rales present.   Abdominal:      Palpations: Abdomen is soft.      Comments: Salter in place   Musculoskeletal:         General: Tenderness present.      Cervical back: Normal range of motion.   Skin:     General: Skin is warm and dry.   Neurological:      General: No focal deficit present.      Motor: Weakness present.   Psychiatric:         Cognition and Memory: Cognition is impaired.         Assessment/Plan   Problem List Items Addressed This Visit             ICD-10-CM    Stage 4 chronic kidney disease (CMS/Formerly [facility]) N18.4    Type 2 diabetes mellitus with chronic kidney disease on chronic dialysis, without long-term current use of insulin (CMS/[facility]ly [facility]) E11.22, N18.6, Z99.2     Other Visit Diagnoses         Codes    Hyponatremia    -  Primary E87.1    Anorexia     R63.0        Start Lantus insulin at 10 units, anorexia persist, hyperglycemia is excessive.  Patient should be staying under hospice care.  Would not get her out of hospice because still it is not very much clear whether she will improve wholly incompletely.  It is extremely unlikely that patient will be leaving from  facility and living independently.  Still we will continue to monitor patient's labs.  Still nursing staff will try their best to assist this patient for feeding.  BP readings are low, Flomax was discontinued.  We will not remove Salter catheter at this time I may take few more days.  Still I would say that patient's prognosis seems guarded.  Discussed with family.     Goals    None

## 2023-10-25 NOTE — PROGRESS NOTES
Affect: Mood normal.         There were no vitals taken for this visit. .   Lab Results   Component Value Date    WBC 6.9 10/14/2023    HGB 8.1 (L) 10/14/2023    HCT 24.6 (L) 10/14/2023    MCV 88.5 10/14/2023     10/14/2023     Lab Results   Component Value Date/Time     10/14/2023 06:08 AM    K 4.6 10/14/2023 06:08 AM    K 4.5 10/10/2023 11:42 PM     10/14/2023 06:08 AM    CO2 19 10/14/2023 06:08 AM    BUN 44 10/14/2023 06:08 AM    CREATININE 3.29 10/14/2023 06:08 AM    GLUCOSE 130 10/14/2023 06:08 AM    GLUCOSE 89 09/01/2020 10:50 AM    CALCIUM 9.2 10/14/2023 06:08 AM    LABGLOM 12.8 10/14/2023 06:08 AM    LABGLOM 20 10/02/2023 12:00 AM                ASSESSMENT:  Patient Active Problem List   Diagnosis    Acute CVA (cerebrovascular accident) ([address])    Altered mental status    Dysphagia, unspecified    Congenital cystic kidney disease    Bursitis of hip    Benign essential hypertension    Backache    Gastro-esophageal reflux disease with esophagitis    Urinary retention    Impairment of balance    Palpitations    Piriformis syndrome    Urinary incontinence    Vascular disorder of extremity (HCC)    Impaired mobility and activities of daily living    Advanced care planning/counseling discussion    Goals of care, counseling/discussion    Palliative care encounter    Neurogenic bladder    History of recent fall    Cardiac arrhythmia    AC (acute kidney injury) ([address])    Hyponatremia    Poorly controlled diabetes mellitus ([address])    Pneumonia of both lower lobes due to infectious organism    Encounter for hospice care discussion    Agitation         PLAN:   Diagnosis Orders   1. Acute CVA (cerebrovascular accident) ([address])        2. Dysphagia, unspecified type        3. Vascular disorder of extremity ([address])        4. Urinary retention        5.  Poorly controlled diabetes mellitus ([address])          Course of physical therapy outpatient therapy skin surveillance transport monitor for signs of endorgan

## 2023-10-26 ASSESSMENT — ENCOUNTER SYMPTOMS
BACK PAIN: 1
CONSTIPATION: 1

## 2023-11-01 ASSESSMENT — ENCOUNTER SYMPTOMS
CONSTIPATION: 1
BACK PAIN: 1

## 2023-11-01 NOTE — PROGRESS NOTES
Transfer Assistance: Independent    Active : No, Patient's  Info: family, Mode of Transportation: Car    Occupation: Retired    Additional Comments: above information provided by pts daughter with pt approval          Social Determinants of Health     Financial Resource Strain: Not on file   Food Insecurity: Not on file   Transportation Needs: Not on file   Physical Activity: Not on file   Stress: Not on file   Social Connections: Not on file   Intimate Partner Violence: Not on file   Housing Stability: Not on file     No family history on file. Allergies   Allergen Reactions    Shellfish Allergy Hives and Anaphylaxis    Atorvastatin Other (See Comments)    Gabapentin Other (See Comments)     Increased blood sugar      Sulfa Antibiotics        MEDICATIONS: Losartan potassium 50 mg p.o. every morning, tamsulosin 0.4 mg p.o. nightly, amlodipine 10 mg p.o. every morning, rosuvastatin 10 mg p.o. nightly, vitamin D3 2000 units p.o. every morning, aspirin 81 mg p.o. every morning, duloxetine HCl 1 capsule p.o. every morning, vitamin C 5 mg p.o. nightly, nitrofurantoin 100 mg p.o. every morning Wednesday/Sunday for preventative, acetaminophen suppository/oral med 6 and 50 mg every 4 hours as needed no greater than 4 g in 24-hour period, oxybutynin ER 10 mg p.o. every morning, hydralazine 25 mg p.o. 3 times daily, glimepiride 2 mg p.o. twice daily. Review of Systems   Unable to perform ROS: Mental status change   Constitutional:  Positive for appetite change and fatigue. Negative for activity change. Gastrointestinal:  Positive for constipation. Musculoskeletal:  Positive for arthralgias and back pain. Skin:  Negative for pallor. Neurological:  Positive for weakness and headaches. Psychiatric/Behavioral:  Positive for confusion and decreased concentration. Negative for agitation. The patient is not nervous/anxious. All other systems reviewed and are negative. Objective:   VS: See [facility].

## 2023-11-06 ASSESSMENT — ENCOUNTER SYMPTOMS
CONSTIPATION: 1
BACK PAIN: 1

## 2023-12-15 ENCOUNTER — APPOINTMENT (OUTPATIENT)
Dept: PRIMARY CARE | Facility: CLINIC | Age: 88
End: 2023-12-15
Payer: MEDICARE